# Patient Record
Sex: FEMALE | Race: WHITE | NOT HISPANIC OR LATINO | Employment: OTHER | URBAN - METROPOLITAN AREA
[De-identification: names, ages, dates, MRNs, and addresses within clinical notes are randomized per-mention and may not be internally consistent; named-entity substitution may affect disease eponyms.]

---

## 2017-01-17 ENCOUNTER — APPOINTMENT (OUTPATIENT)
Dept: VASCULAR LAB | Facility: MEDICAL CENTER | Age: 82
End: 2017-01-17
Attending: NURSE PRACTITIONER
Payer: MEDICARE

## 2017-02-19 ENCOUNTER — HOSPITAL ENCOUNTER (EMERGENCY)
Facility: MEDICAL CENTER | Age: 82
End: 2017-02-19
Attending: EMERGENCY MEDICINE
Payer: MEDICARE

## 2017-02-19 ENCOUNTER — APPOINTMENT (OUTPATIENT)
Dept: RADIOLOGY | Facility: MEDICAL CENTER | Age: 82
End: 2017-02-19
Attending: EMERGENCY MEDICINE
Payer: MEDICARE

## 2017-02-19 VITALS
HEART RATE: 80 BPM | HEIGHT: 61 IN | RESPIRATION RATE: 16 BRPM | BODY MASS INDEX: 27.06 KG/M2 | TEMPERATURE: 100.1 F | DIASTOLIC BLOOD PRESSURE: 88 MMHG | OXYGEN SATURATION: 98 % | SYSTOLIC BLOOD PRESSURE: 174 MMHG | WEIGHT: 143.3 LBS

## 2017-02-19 DIAGNOSIS — S96.912A LEFT ANKLE STRAIN, INITIAL ENCOUNTER: ICD-10-CM

## 2017-02-19 PROCEDURE — 73610 X-RAY EXAM OF ANKLE: CPT | Mod: LT

## 2017-02-19 PROCEDURE — 99284 EMERGENCY DEPT VISIT MOD MDM: CPT

## 2017-02-19 NOTE — ED AVS SNAPSHOT
2/19/2017          Fabien Linares  [address] [place] [address]    Dear Fabien:    [facility] wants to ensure your discharge home is safe and you or your loved ones have had all your questions answered regarding your care after you leave the hospital.    You may receive a telephone call within two days of your discharge.  This call is to make certain you understand your discharge instructions as well as ensure we provided you with the best care possible during your stay with us.     The call will only last approximately 3-5 minutes and will be done by a nurse.    Once again, we want to ensure your discharge home is safe and that you have a clear understanding of any next steps in your care.  If you have any questions or concerns, please do not hesitate to contact us, we are here for you.  Thank you for choosing [facility] for your healthcare needs.    Sincerely,    Pito Ellis    [facility]

## 2017-02-19 NOTE — ED AVS SNAPSHOT
Wrnch Access Code: Activation code not generated  Current Wrnch Status: Active    Ahometohart  A secure, online tool to manage your health information     [employer]s Wrnch® is a secure, online tool that connects you to your personalized health information from the privacy of your home -- day or night - making it very easy for you to manage your healthcare. Once the activation process is completed, you can even access your medical information using the Wrnch dein, which is available for free in the Apple Edin store or Google Play store.     Wrnch provides the following levels of access (as shown below):   My Chart Features   [facility] Primary Care Doctor [facility]  Specialists [facility]  Urgent  Care Non-[facility]  Primary Care  Doctor   Email your healthcare team securely and privately 24/7 X X X X   Manage appointments: schedule your next appointment; view details of past/upcoming appointments X      Request prescription refills. X      View recent personal medical records, including lab and immunizations X X X X   View health record, including health history, allergies, medications X X X X   Read reports about your outpatient visits, procedures, consult and ER notes X X X X   See your discharge summary, which is a recap of your hospital and/or ER visit that includes your diagnosis, lab results, and care plan. X X       How to register for Wrnch:  1. Go to  [email or web address].  2. Click on the Sign Up Now box, which takes you to the New Member Sign Up page. You will need to provide the following information:  a. Enter your Wrnch Access Code exactly as it appears at the top of this page. (You will not need to use this code after you’ve completed the sign-up process. If you do not sign up before the expiration date, you must request a new code.)   b. Enter your date of birth.   c. Enter your home email address.   d. Click Submit, and follow the next screen’s instructions.  3. Create a Wrnch ID. This will  be your LiveRamp login ID and cannot be changed, so think of one that is secure and easy to remember.  4. Create a LiveRamp password. You can change your password at any time.  5. Enter your Password Reset Question and Answer. This can be used at a later time if you forget your password.   6. Enter your e-mail address. This allows you to receive e-mail notifications when new information is available in LiveRamp.  7. Click Sign Up. You can now view your health information.    For assistance activating your LiveRamp account, call [phone]

## 2017-02-19 NOTE — ED AVS SNAPSHOT
Home Care Instructions                                                                                                                Fabien Linares   MRN: [id number]    Department:  [facility], Emergency Dept   Date of Visit:  2/19/2017            [facility], Emergency Dept    [address] [place] [address]    Phone:  [phone]      You were seen by     Prasad Wilson M.D.      Your Diagnosis Was     Left ankle strain, initial encounter     S96.911O       Follow-up Information     1. Follow up with Sylvain Diallo M.D..    Specialty:  Orthopaedics    Why:  As needed    Contact information    [address] [place] [id number] [phone]        Medication Information     Review all of your home medications and newly ordered medications with your primary doctor and/or pharmacist as soon as possible. Follow medication instructions as directed by your doctor and/or pharmacist.     Please keep your complete medication list with you and share with your physician. Update the information when medications are discontinued, doses are changed, or new medications (including over-the-counter products) are added; and carry medication information at all times in the event of emergency situations.               Medication List      ASK your doctor about these medications        Instructions    atorvastatin 40 MG Tabs   Commonly known as:  LIPITOR    Take 1 Tab by mouth every bedtime.   Dose:  40 mg       Coenzyme Q-10 200 MG Caps    Take 1 Cap by mouth every day.   Dose:  200 mg       metoprolol SR 25 MG Tb24   Commonly known as:  TOPROL XL    Take 0.5 Tabs by mouth every bedtime.   Dose:  12.5 mg       warfarin 5 MG Tabs   Commonly known as:  COUMADIN    Doctor's comments:  This is a refill   Take 1-1.5 tabs by mouth daily or as directed by the coumadin clinic               Procedures and tests performed during your visit     DX-ANKLE 3+ VIEWS LEFT    NURSING COMMUNICATION        Discharge Instructions       Ankle Sprain  An ankle sprain is an injury to the strong, fibrous tissues (ligaments) that hold the bones of your ankle joint together.   CAUSES  An ankle sprain is usually caused by a fall or by twisting your ankle. Ankle sprains most commonly occur when you step on the outer edge of your foot, and your ankle turns inward. People who participate in sports are more prone to these types of injuries.   SYMPTOMS   · Pain in your ankle. The pain may be present at rest or only when you are trying to stand or walk.  · Swelling.  · Bruising. Bruising may develop immediately or within 1 to 2 days after your injury.  · Difficulty standing or walking, particularly when turning corners or changing directions.  DIAGNOSIS   Your caregiver will ask you details about your injury and perform a physical exam of your ankle to determine if you have an ankle sprain. During the physical exam, your caregiver will press on and apply pressure to specific areas of your foot and ankle. Your caregiver will try to move your ankle in certain ways. An X-ray exam may be done to be sure a bone was not broken or a ligament did not separate from one of the bones in your ankle (avulsion fracture).   TREATMENT   Certain types of braces can help stabilize your ankle. Your caregiver can make a recommendation for this. Your caregiver may recommend the use of medicine for pain. If your sprain is severe, your caregiver may refer you to a surgeon who helps to restore function to parts of your skeletal system (orthopedist) or a physical therapist.  HOME CARE INSTRUCTIONS   · Apply ice to your injury for 1-2 days or as directed by your caregiver. Applying ice helps to reduce inflammation and pain.  ¨ Put ice in a plastic bag.  ¨ Place a towel between your skin and the bag.  ¨ Leave the ice on for 15-20 minutes at a time, every 2 hours while you are awake.  · Only take over-the-counter or prescription  medicines for pain, discomfort, or fever as directed by your caregiver.  · Elevate your injured ankle above the level of your heart as much as possible for 2-3 days.  · If your caregiver recommends crutches, use them as instructed. Gradually put weight on the affected ankle. Continue to use crutches or a cane until you can walk without feeling pain in your ankle.  · If you have a plaster splint, wear the splint as directed by your caregiver. Do not rest it on anything harder than a pillow for the first 24 hours. Do not put weight on it. Do not get it wet. You may take it off to take a shower or bath.  · You may have been given an elastic bandage to wear around your ankle to provide support. If the elastic bandage is too tight (you have numbness or tingling in your foot or your foot becomes cold and blue), adjust the bandage to make it comfortable.  · If you have an air splint, you may blow more air into it or let air out to make it more comfortable. You may take your splint off at night and before taking a shower or bath. Wiggle your toes in the splint several times per day to decrease swelling.  SEEK MEDICAL CARE IF:   · You have rapidly increasing bruising or swelling.  · Your toes feel extremely cold or you lose feeling in your foot.  · Your pain is not relieved with medicine.  SEEK IMMEDIATE MEDICAL CARE IF:  · Your toes are numb or blue.  · You have severe pain that is increasing.  MAKE SURE YOU:   · Understand these instructions.  · Will watch your condition.  · Will get help right away if you are not doing well or get worse.     This information is not intended to replace advice given to you by your health care provider. Make sure you discuss any questions you have with your health care provider.     Document Released: 12/18/2006 Document Revised: 01/08/2016 Document Reviewed: 12/29/2012  [employer] Interactive Patient Education ©2016 [employer]            Patient Information     Patient  Information    Following emergency treatment: all patient requiring follow-up care must return either to a private physician or a clinic if your condition worsens before you are able to obtain further medical attention, please return to the emergency room.     Billing Information    At [facility], we work to make the billing process streamlined for our patients.  Our Representatives are here to answer any questions you may have regarding your hospital bill.  If you have insurance coverage and have supplied your insurance information to us, we will submit a claim to your insurer on your behalf.  Should you have any questions regarding your bill, we can be reached online or by phone as follows:  Online: You are able pay your bills online or live chat with our representatives about any billing questions you may have. We are here to help Monday - Friday from 8:00am to 7:30pm and 9:00am - 12:00pm on Saturdays.  Please visit [email or web address]  for more information.   Phone:  [phone] or [phone]    Please note that your emergency physician, surgeon, pathologist, radiologist, anesthesiologist, and other specialists are not employed by [facility] and will therefore bill separately for their services.  Please contact them directly for any questions concerning their bills at the numbers below:     Emergency Physician Services:  [phone]  [facility]:  [phone]  [facility]:  [phone]  [facility]:  [phone]    1. Your final bill may vary from the amount quoted upon discharge if all procedures are not complete at that time, or if your doctor has additional procedures of which we are not aware. You will receive an additional bill if you return to the Emergency Department at [facility] for suture removal regardless of the facility of which the sutures were placed.     2. Please arrange for settlement of this account  at the emergency registration.    3. All self-pay accounts are due in full at the time of treatment.  If you are unable to meet this obligation then payment is expected within 4-5 days.     4. If you have had radiology studies (CT, X-ray, Ultrasound, MRI), you have received a preliminary result during your emergency department visit. Please contact the radiology department [phone] to receive a copy of your final result. Please discuss the Final result with your primary physician or with the follow up physician provided.     Crisis Hotline:  [place] Crisis Hotline:  6-592-JSIRSBW or 1-891.100.9040  [place] Crisis Hotline:    1-569.217.8813 or 772-646-2696         ED Discharge Follow Up Questions    1. In order to provide you with very good care, we would like to follow up with a phone call in the next few days.  May we have your permission to contact you?     YES /  NO    2. What is the best phone number to call you? (       )_____-__________    3. What is the best time to call you?      Morning  /  Afternoon  /  Evening                   Patient Signature:  ____________________________________________________________    Date:  ____________________________________________________________

## 2017-02-20 NOTE — ED NOTES
Discharge instructions provided. Crutches and air splint education given, Pt verbalized the understanding of discharge instructions to follow up with PCP and ortho and to return to ER if condition worsens.  Pt ambulated out of ER without difficulty.

## 2017-02-20 NOTE — ED NOTES
Chief Complaint   Patient presents with   • Ankle Pain     Left, started this afternoon while working in the yard

## 2017-02-20 NOTE — ED PROVIDER NOTES
ED Provider Note    HPI:  Patient is an 87-year-old female who presented to the emergency department February 19, 2017 at 6:29 PM with a chief complaint left ankle pain.    Patient thinks she may have strained her ankle while working in the yard today. She has pain in the lateral aspect of the ankle. No numbness or tingling. She denied knee pain. Patient also notes a little bit left-sided chest pain that she states began a couple of weeks ago when she fell. She had no abdominal pain she has not had fever chills or cough. No head or neck trauma occurred. She has no other somatic complaints.    Review of Systems: Positive left ankle pain left-sided chest wall pain negative for shortness of breath fever chills cough knee pain numbness or tingling of her left lower extremity.    Past medical/surgical history: Osteoporosis Bell's palsy reflux disease atrial fibrillation appendectomy hysterectomy    Medications: Metoprolol Coumadin Lipitor    Allergies: Actonel amoxicillin morphine Naprosyn Vicodin    Social History: Patient does not smoke occasionally use of alcohol      Physical exam: Constitutional: Elderly female awake and alert pleasant  Vital signs: Temperature 100.1 pulse 74 respirations 18 blood pressure 158/81 pulse oximetry 98%  Musculoskeletal: Pain diffusely throughout the left ankle pain seems maximal over the lateral malleolus. I can elicit no pain with vigorous palpation left knee. The patient had some mild pain with deep palpation of the chest wall on the left but no crepitance is felt. No other pain with palpation or movement of muscle bone or joint.  Neurologic: alert and awake answers questions appropriately. Decreased range of motion of left lower extremity due to pain in the ankle area. Patient is able to move her toes without difficulty. No other focal neurologic abnormalities identified.  Skin: no rash or lesion seen, no palpable dermatologic lesions identified.  Psychiatric: not anxious,  delusional, or hallucinating.    Medical decision making:  X-ray left ankle obtained; osteopenia appears to be present. There is no obvious fracture.    Patient placed in an air splint for support. She'll be discharged home. She is counseled to follow up with orthopedics if not markedly improved in 3-5 days to check for missed injury. She is given referral to orthopedist on call. I made it clear the patient as well as encouraging her x-rays do not appear to show an acute fracture or other abnormality we did not rule out an occult injury and follow-up is important as outlined above. The patient does not appear to have a significant chest injury at this time. She has no signs or symptoms of pneumonia. The patient has no signs or symptoms of a neurovascular injury.    Patient verbalized understanding of the above instructions and states she will comply    Impression left ankle sprain cannot rule out occult injury

## 2017-02-20 NOTE — DISCHARGE INSTRUCTIONS
Ankle Sprain  An ankle sprain is an injury to the strong, fibrous tissues (ligaments) that hold the bones of your ankle joint together.   CAUSES  An ankle sprain is usually caused by a fall or by twisting your ankle. Ankle sprains most commonly occur when you step on the outer edge of your foot, and your ankle turns inward. People who participate in sports are more prone to these types of injuries.   SYMPTOMS   · Pain in your ankle. The pain may be present at rest or only when you are trying to stand or walk.  · Swelling.  · Bruising. Bruising may develop immediately or within 1 to 2 days after your injury.  · Difficulty standing or walking, particularly when turning corners or changing directions.  DIAGNOSIS   Your caregiver will ask you details about your injury and perform a physical exam of your ankle to determine if you have an ankle sprain. During the physical exam, your caregiver will press on and apply pressure to specific areas of your foot and ankle. Your caregiver will try to move your ankle in certain ways. An X-ray exam may be done to be sure a bone was not broken or a ligament did not separate from one of the bones in your ankle (avulsion fracture).   TREATMENT   Certain types of braces can help stabilize your ankle. Your caregiver can make a recommendation for this. Your caregiver may recommend the use of medicine for pain. If your sprain is severe, your caregiver may refer you to a surgeon who helps to restore function to parts of your skeletal system (orthopedist) or a physical therapist.  HOME CARE INSTRUCTIONS   · Apply ice to your injury for 1-2 days or as directed by your caregiver. Applying ice helps to reduce inflammation and pain.  ¨ Put ice in a plastic bag.  ¨ Place a towel between your skin and the bag.  ¨ Leave the ice on for 15-20 minutes at a time, every 2 hours while you are awake.  · Only take over-the-counter or prescription medicines for pain, discomfort, or fever as directed by  your caregiver.  · Elevate your injured ankle above the level of your heart as much as possible for 2-3 days.  · If your caregiver recommends crutches, use them as instructed. Gradually put weight on the affected ankle. Continue to use crutches or a cane until you can walk without feeling pain in your ankle.  · If you have a plaster splint, wear the splint as directed by your caregiver. Do not rest it on anything harder than a pillow for the first 24 hours. Do not put weight on it. Do not get it wet. You may take it off to take a shower or bath.  · You may have been given an elastic bandage to wear around your ankle to provide support. If the elastic bandage is too tight (you have numbness or tingling in your foot or your foot becomes cold and blue), adjust the bandage to make it comfortable.  · If you have an air splint, you may blow more air into it or let air out to make it more comfortable. You may take your splint off at night and before taking a shower or bath. Wiggle your toes in the splint several times per day to decrease swelling.  SEEK MEDICAL CARE IF:   · You have rapidly increasing bruising or swelling.  · Your toes feel extremely cold or you lose feeling in your foot.  · Your pain is not relieved with medicine.  SEEK IMMEDIATE MEDICAL CARE IF:  · Your toes are numb or blue.  · You have severe pain that is increasing.  MAKE SURE YOU:   · Understand these instructions.  · Will watch your condition.  · Will get help right away if you are not doing well or get worse.     This information is not intended to replace advice given to you by your health care provider. Make sure you discuss any questions you have with your health care provider.     Document Released: 12/18/2006 Document Revised: 01/08/2016 Document Reviewed: 12/29/2012  [employer] Interactive Patient Education ©2016 [employer]

## 2017-03-01 DIAGNOSIS — I48.91 ATRIAL FIBRILLATION, UNSPECIFIED TYPE (HCC): ICD-10-CM

## 2017-03-10 ENCOUNTER — ANTICOAGULATION VISIT (OUTPATIENT)
Dept: VASCULAR LAB | Facility: MEDICAL CENTER | Age: 82
End: 2017-03-10
Attending: INTERNAL MEDICINE
Payer: MEDICARE

## 2017-03-10 DIAGNOSIS — I48.91 ATRIAL FIBRILLATION WITH RVR (HCC): ICD-10-CM

## 2017-03-10 LAB — INR PPP: 2.6 (ref 2–3.5)

## 2017-03-10 PROCEDURE — 85610 PROTHROMBIN TIME: CPT

## 2017-03-10 PROCEDURE — 99211 OFF/OP EST MAY X REQ PHY/QHP: CPT

## 2017-03-10 NOTE — PROGRESS NOTES
Anticoagulation Summary as of 3/10/2017     INR goal 2.0-3.0   Selected INR 2.6 (3/10/2017)   Maintenance plan 7.5 mg (5 mg x 1.5) on Mon, Fri; 5 mg (5 mg x 1) all other days   Weekly total 40 mg   Plan last modified HARVINDER Shine (2/29/2016)   Next INR check 6/2/2017   Target end date Indefinite    Indications   Atrial fibrillation with RVR (CMS-HCC) [I48.91]         Anticoagulation Episode Summary     [facility] check location [facility]    Preferred lab     Send INR reminders to     Comments Patient had LTTR- consider alternative      Anticoagulation Care Providers     Provider Role Specialty Phone number    Ines Maguire M.D. Referring Cardiology [phone]    Joseline Nash M.D. Referring Family Medicine [phone]    [facility] Responsible  [phone]        Anticoagulation Patient Findings    Patient's INR was therapeutic today in clinic.    Pt denies any unusual s/s of bleeding, bruising, clotting or any changes to diet or medications.  Confirmed dosing regimen.  Pt is to continue with current warfarin dosing regimen.    Follow up in 12 weeks.    Gareth Kamara, PHARMD

## 2017-03-10 NOTE — MR AVS SNAPSHOT
Fabien Sherman Milagros   3/10/2017 11:15 AM   Anticoagulation Visit   MRN: [id number]    Department:  Vascular Medicine   Dept Phone:  [phone]    Description:  Female : 1929   Provider:  [facility] EXAM 4           Allergies as of 3/10/2017     Allergen Noted Reactions    Actonel [Risedronate Sodium] 2010       Leg pain    Amoxicillin 09/15/2012       Morphine Sulfate 2010       Patient became A Fib    Naproxen 2010       unknown rxn; doctor said not to take    Vicodin [Hydrocodone-Acetaminophen] 2010       Unknown rxn; doctor said not to take      You were diagnosed with     Atrial fibrillation with RVR (CMS-HCC)   [119626]         Vital Signs     Smoking Status                   Never Smoker            Basic Information     Date Of Birth Sex Race Ethnicity Preferred Language    1929 Female White Non- English      Your appointments     Mar 10, 2017 11:15 AM   Established Patient with [facility] EXAM 4   [facility]  (--)    [address] [address]   [phone]            2017 11:00 AM   Established Patient with IHV EXAM 5   [facility]  (--)    [address] [address]   [phone]              Problem List              ICD-10-CM Priority Class Noted - Resolved    Atrial fibrillation with RVR (CMS-HCC) I48.91   2014 - Present    GERD (gastroesophageal reflux disease) K21.9   Unknown - Present    Bell's palsy G51.0   Unknown - Present    Osteoporosis M81.0   Unknown - Present    Colon polyps K63.5   Unknown - Present    Preventative health care Z00.00   3/5/2014 - Present      Health Maintenance        Date Due Completion Dates    IMM DTaP/Tdap/Td Vaccine (1 - Tdap) 1948 ---    PAP SMEAR 1950 ---    IMM ZOSTER VACCINE 1989 ---    IMM PNEUMOCOCCAL 65+ (ADULT) LOW/MEDIUM RISK SERIES (1 of 2 - PCV13) 1994 ---    MAMMOGRAM 5/20/2016 5/20/2015, 4/18/2014, 6/25/2012, 6/22/2011, 7/7/2010, 7/7/2010, 6/22/2009, 6/22/2009, 6/19/2008, 6/19/2008, 3/16/2007, 3/16/2007, 5/22/2006, 4/21/2005    IMM INFLUENZA (1) 9/1/2016 1/9/2014            Results     POCT Protime      Component    INR    2.6                        Current Immunizations     Influenza Vaccine Pediatric 1/9/2014 10:29 AM      Below and/or attached are the medications your provider expects you to take. Review all of your home medications and newly ordered medications with your provider and/or pharmacist. Follow medication instructions as directed by your provider and/or pharmacist. Please keep your medication list with you and share with your provider. Update the information when medications are discontinued, doses are changed, or new medications (including over-the-counter products) are added; and carry medication information at all times in the event of emergency situations     Allergies:  ACTONEL - (reactions not documented)     AMOXICILLIN - (reactions not documented)     MORPHINE SULFATE - (reactions not documented)     NAPROXEN - (reactions not documented)     VICODIN - (reactions not documented)               Medications  Valid as of: March 10, 2017 - 10:41 AM    Generic Name Brand Name Tablet Size Instructions for use    Atorvastatin Calcium (Tab) LIPITOR 40 MG Take 1 Tab by mouth every bedtime.        Coenzyme Q10 (Cap) Coenzyme Q-10 200 MG Take 1 Cap by mouth every day.        Metoprolol Succinate (TABLET SR 24 HR) TOPROL XL 25 MG Take 0.5 Tabs by mouth every bedtime.        Warfarin Sodium (Tab) COUMADIN 5 MG Take 1-1.5 tabs by mouth daily or as directed by the coumadin clinic        .                 Medicines prescribed today were sent to:     [facility] [address] [place] [address] (), [place] [address]    [address] [place] [address] () [place] [address]    Phone: [phone] Fax: [phone]    Open 24 Hours?: No      Medication refill instructions:       If your  prescription bottle indicates you have medication refills left, it is not necessary to call your provider’s office. Please contact your pharmacy and they will refill your medication.    If your prescription bottle indicates you do not have any refills left, you may request refills at any time through one of the following ways: The online Vaultive system (except Urgent Care), by calling your provider’s office, or by asking your pharmacy to contact your provider’s office with a refill request. Medication refills are processed only during regular business hours and may not be available until the next business day. Your provider may request additional information or to have a follow-up visit with you prior to refilling your medication.   *Please Note: Medication refills are assigned a new Rx number when refilled electronically. Your pharmacy may indicate that no refills were authorized even though a new prescription for the same medication is available at the pharmacy. Please request the medicine by name with the pharmacy before contacting your provider for a refill.        Warfarin Dosing Calendar   March 2017 Details    Sun Mon Tue Wed Thu Fri Sat        1               2               3               4                 5               6               7               8               9               10   2.6   7.5 mg   See details      11      5 mg           12      5 mg         13      7.5 mg         14      5 mg         15      5 mg         16      5 mg         17      7.5 mg         18      5 mg           19      5 mg         20      7.5 mg         21      5 mg         22      5 mg         23      5 mg         24      7.5 mg         25      5 mg           26      5 mg         27      7.5 mg         28      5 mg         29      5 mg         30      5 mg         31      7.5 mg           Date Details   03/10 This INR check   INR: 2.6               How to take your warfarin dose     To take:  5 mg Take 1 of the 5 mg  tablets.    To take:  7.5 mg Take 1.5 of the 5 mg tablets.           Warfarin Dosing Calendar   April 2017 Details    Sun Mon Tue Wed Thu Fri Sat           1      5 mg           2      5 mg         3      7.5 mg         4      5 mg         5      5 mg         6      5 mg         7      7.5 mg         8      5 mg           9      5 mg         10      7.5 mg         11      5 mg         12      5 mg         13      5 mg         14      7.5 mg         15      5 mg           16      5 mg         17      7.5 mg         18      5 mg         19      5 mg         20      5 mg         21      7.5 mg         22      5 mg           23      5 mg         24      7.5 mg         25      5 mg         26      5 mg         27      5 mg         28      7.5 mg         29      5 mg           30      5 mg                Date Details   No additional details            How to take your warfarin dose     To take:  5 mg Take 1 of the 5 mg tablets.    To take:  7.5 mg Take 1.5 of the 5 mg tablets.           Warfarin Dosing Calendar   May 2017 Details    Sun Mon Tue Wed Thu Fri Sat      1      7.5 mg         2      5 mg         3      5 mg         4      5 mg         5      7.5 mg         6      5 mg           7      5 mg         8      7.5 mg         9      5 mg         10      5 mg         11      5 mg         12      7.5 mg         13      5 mg           14      5 mg         15      7.5 mg         16      5 mg         17      5 mg         18      5 mg         19      7.5 mg         20      5 mg           21      5 mg         22      7.5 mg         23      5 mg         24      5 mg         25      5 mg         26      7.5 mg         27      5 mg           28      5 mg         29      7.5 mg         30      5 mg         31      5 mg             Date Details   No additional details            How to take your warfarin dose     To take:  5 mg Take 1 of the 5 mg tablets.    To take:  7.5 mg Take 1.5 of the 5 mg tablets.           Warfarin Dosing  Calendar   June 2017 Details    Sun Mon Tue Wed Thu Fri Sat         1      5 mg         2      7.5 mg         3                 4               5               6               7               8               9               10                 11               12               13               14               15               16               17                 18               19               20               21               22               23               24                 25               26               27               28               29               30                 Date Details   No additional details    Date of next INR:  6/2/2017         How to take your warfarin dose     To take:  5 mg Take 1 of the 5 mg tablets.    To take:  7.5 mg Take 1.5 of the 5 mg tablets.              K121 Access Code: Activation code not generated  Current K121 Status: Active

## 2017-04-05 ENCOUNTER — HOSPITAL ENCOUNTER (EMERGENCY)
Facility: MEDICAL CENTER | Age: 82
End: 2017-04-05
Attending: EMERGENCY MEDICINE
Payer: MEDICARE

## 2017-04-05 VITALS
HEART RATE: 61 BPM | HEIGHT: 62 IN | RESPIRATION RATE: 16 BRPM | BODY MASS INDEX: 25.4 KG/M2 | SYSTOLIC BLOOD PRESSURE: 159 MMHG | WEIGHT: 138.01 LBS | OXYGEN SATURATION: 96 % | DIASTOLIC BLOOD PRESSURE: 79 MMHG | TEMPERATURE: 97.6 F

## 2017-04-05 DIAGNOSIS — K08.9 DENTAL DISORDER: ICD-10-CM

## 2017-04-05 PROCEDURE — 99283 EMERGENCY DEPT VISIT LOW MDM: CPT

## 2017-04-05 ASSESSMENT — PAIN SCALES - GENERAL: PAINLEVEL_OUTOF10: 0

## 2017-04-05 NOTE — ED AVS SNAPSHOT
Home Care Instructions                                                                                                                Fabien Linares   MRN: [id number]    Department:  [facility], Emergency Dept   Date of Visit:  4/5/2017            [facility], Emergency Dept    [address]    [facility] [address]    Phone:  [phone]      You were seen by     Ezra Reed M.D.      Your Diagnosis Was     Dental disorder     K08.9       Follow-up Information     1. Follow up with Jimmy Coburn M.D..    Specialty:  Oral Surgery    Why:  as directed    Contact information    [address]  [facility] [address] [phone]        Medication Information     Review all of your home medications and newly ordered medications with your primary doctor and/or pharmacist as soon as possible. Follow medication instructions as directed by your doctor and/or pharmacist.     Please keep your complete medication list with you and share with your physician. Update the information when medications are discontinued, doses are changed, or new medications (including over-the-counter products) are added; and carry medication information at all times in the event of emergency situations.               Medication List      ASK your doctor about these medications        Instructions    Morning Afternoon Evening Bedtime    atorvastatin 40 MG Tabs   Commonly known as:  LIPITOR        Take 1 Tab by mouth every bedtime.   Dose:  40 mg                        Coenzyme Q-10 200 MG Caps        Take 1 Cap by mouth every day.   Dose:  200 mg                        metoprolol SR 25 MG Tb24   Commonly known as:  TOPROL XL        Take 0.5 Tabs by mouth every bedtime.   Dose:  12.5 mg                        warfarin 5 MG Tabs   Commonly known as:  COUMADIN        Doctor's comments:  This is a refill   Take 1-1.5 tabs by mouth daily or as directed by the coumadin clinic                                  Discharge Instructions       Return to the emergency department for fevers, swelling, severe bleeding or concern          Patient Information     Patient Information    Following emergency treatment: all patient requiring follow-up care must return either to a private physician or a clinic if your condition worsens before you are able to obtain further medical attention, please return to the emergency room.     Billing Information    At [facility], we work to make the billing process streamlined for our patients.  Our Representatives are here to answer any questions you may have regarding your hospital bill.  If you have insurance coverage and have supplied your insurance information to us, we will submit a claim to your insurer on your behalf.  Should you have any questions regarding your bill, we can be reached online or by phone as follows:  Online: You are able pay your bills online or live chat with our representatives about any billing questions you may have. We are here to help Monday - Friday from 8:00am to 7:30pm and 9:00am - 12:00pm on Saturdays.  Please visit [email or web address]  for more information.   Phone:  [phone] or [phone]    Please note that your emergency physician, surgeon, pathologist, radiologist, anesthesiologist, and other specialists are not employed by [facility] and will therefore bill separately for their services.  Please contact them directly for any questions concerning their bills at the numbers below:     Emergency Physician Services:  [phone]  [facility]:  [phone]  [facility]:  [phone]  [facility]:  [phone]    1. Your final bill may vary from the amount quoted upon discharge if all procedures are not complete at that time, or if your doctor has additional procedures of which we are not aware. You will receive an additional bill if you return to the Emergency  Department at [facility] for suture removal regardless of the facility of which the sutures were placed.     2. Please arrange for settlement of this account at the emergency registration.    3. All self-pay accounts are due in full at the time of treatment.  If you are unable to meet this obligation then payment is expected within 4-5 days.     4. If you have had radiology studies (CT, X-ray, Ultrasound, MRI), you have received a preliminary result during your emergency department visit. Please contact the radiology department [phone] to receive a copy of your final result. Please discuss the Final result with your primary physician or with the follow up physician provided.     Crisis Hotline:  [place] Crisis Hotline:  6-063-VDUJVBV or 1-759.561.7382  [place] Crisis Hotline:    1-897.969.9188 or 633-420-5247         ED Discharge Follow Up Questions    1. In order to provide you with very good care, we would like to follow up with a phone call in the next few days.  May we have your permission to contact you?     YES /  NO    2. What is the best phone number to call you? (       )_____-__________    3. What is the best time to call you?      Morning  /  Afternoon  /  Evening                   Patient Signature:  ____________________________________________________________    Date:  ____________________________________________________________      Your appointments     Jun 02, 2017 11:00 AM   Established Patient with IHVH EXAM 5   [facility]  (--)    [address] [facility] [address] [facility]  [place] [address]   [phone]

## 2017-04-05 NOTE — ED NOTES
Pt discharge home. Pt given discharge instructions . Pt verbalized understanding, all questions answered ,vss upon d/c. Pt steady on feet upon discharge

## 2017-04-05 NOTE — ED PROVIDER NOTES
"ED Provider Note    CHIEF COMPLAINT  Chief Complaint   Patient presents with   • Other     Pt had molar removed from top left jaw on . Pt now has large clot where molar was removed.        HPI  Fabien Linares is a 87 y.o. female who presents after seeing a clot at a recent dental extraction site. She had molar removed on  with Dr. Coburn. Saw a large clot this morning and became concerned because her mother  because of blood clots. She denies any other symptoms. She states that the clot was larger, but is now smaller and thinks it might have moved. No chest pain shortness of breath, swelling, ongoing bleeding    REVIEW OF SYSTEMS  Pertinent negative: As above    PAST MEDICAL HISTORY  Past Medical History   Diagnosis Date   • Osteoporosis, unspecified    • Bell's palsy    • GERD (gastroesophageal reflux disease)    • Atrial fibrillation with RVR    • Colon polyps        SOCIAL HISTORY  Social History   Substance Use Topics   • Smoking status: Never Smoker    • Smokeless tobacco: Never Used   • Alcohol Use: Yes      Comment: occ wine       SURGICAL HISTORY  Past Surgical History   Procedure Laterality Date   • Appendectomy     • Inguinal hernia repair       Hernia Repair, Inguinal   • Vaginal hysterectomy total       Hysterectomy,Total Vaginal   • Other orthopedic surgery       foot surgery bilat   • Lumpectomy         ALLERGIES  Allergies   Allergen Reactions   • Actonel [Risedronate Sodium]      Leg pain   • Amoxicillin    • Morphine Sulfate      Patient became A Fib   • Naproxen      unknown rxn; doctor said not to take   • Vicodin [Hydrocodone-Acetaminophen]      Unknown rxn; doctor said not to take       PHYSICAL EXAM  VITAL SIGNS: /73 mmHg  Pulse 72  Temp(Src) 36 °C (96.8 °F)  Resp 16  Ht 1.575 m (5' 2\")  Wt 62.6 kg (138 lb 0.1 oz)  BMI 25.24 kg/m2  SpO2 94%   Constitutional: Awake and alert. Nontoxic  HENT: There is an extraction socket left 1st maxillary molar. No remarkable " surrounding swelling. No discharge. There is a small clot within the socket.  Eyes: Grossly normal  Neck: Normal range of motion  Cardiovascular: Normal heart rate   Thorax & Lungs: No respiratory distress  Skin:  No pathologic rash.   Extremities: Well perfused  Psychiatric: Affect normal        COURSE & MEDICAL DECISION MAKING  Patient presents to the ER concerned about the possibility of venous thromboembolus related to a thrombus with in the gingiva after dental extraction. Her physical exam is unremarkable. She does not have any concerning symptoms. I have offered reassurance. Advised to return to the ER for swelling, fevers or concern.      FINAL IMPRESSION  1. Post dental extraction thrombus      Disposition: home in good condition      This dictation was created using voice recognition software. The accuracy of the dictation is limited to the abilities of the software.  The nursing notes were reviewed and certain aspects of this information were incorporated into this note.      Electronically signed by: Ezra Reed, 4/5/2017 7:21 AM

## 2017-04-05 NOTE — ED AVS SNAPSHOT
4/5/2017          Fabien Linares  [address] [place] [address]    Dear Fabien:    [facility] wants to ensure your discharge home is safe and you or your loved ones have had all your questions answered regarding your care after you leave the hospital.    You may receive a telephone call within two days of your discharge.  This call is to make certain you understand your discharge instructions as well as ensure we provided you with the best care possible during your stay with us.     The call will only last approximately 3-5 minutes and will be done by a nurse.    Once again, we want to ensure your discharge home is safe and that you have a clear understanding of any next steps in your care.  If you have any questions or concerns, please do not hesitate to contact us, we are here for you.  Thank you for choosing [facility] for your healthcare needs.    Sincerely,    Pito Ellis    [facility]

## 2017-04-05 NOTE — ED NOTES
"Fabien Linares  87 y.o. female  Chief Complaint   Patient presents with   • Other     Pt had molar removed from top left jaw on 4/4. Pt now has large clot where molar was removed.      Pt amb to triage with steady gait for above complaint. Pt states, \"I'm just afraid of the clot moving.\" Pt has no other complaints at this time.  Pt placed in lobby. Pt educated on triage process. Pt encouraged to alert staff for any changes.    "

## 2017-04-05 NOTE — ED AVS SNAPSHOT
RealTargeting Access Code: Activation code not generated  Current RealTargeting Status: Active    [employer]  A secure, online tool to manage your health information     [employer] RealTargeting® is a secure, online tool that connects you to your personalized health information from the privacy of your home -- day or night - making it very easy for you to manage your healthcare. Once the activation process is completed, you can even access your medical information using the RealTargeting edin, which is available for free in the Apple Edin store or Google Play store.     RealTargeting provides the following levels of access (as shown below):   My Chart Features   [facility] Primary Care Doctor [facility]  Specialists [facility]  Urgent  Care Non-[facility]  Primary Care  Doctor   Email your healthcare team securely and privately 24/7 X X X X   Manage appointments: schedule your next appointment; view details of past/upcoming appointments X      Request prescription refills. X      View recent personal medical records, including lab and immunizations X X X X   View health record, including health history, allergies, medications X X X X   Read reports about your outpatient visits, procedures, consult and ER notes X X X X   See your discharge summary, which is a recap of your hospital and/or ER visit that includes your diagnosis, lab results, and care plan. X X       How to register for RealTargeting:  1. Go to  [email or web address].  2. Click on the Sign Up Now box, which takes you to the New Member Sign Up page. You will need to provide the following information:  a. Enter your RealTargeting Access Code exactly as it appears at the top of this page. (You will not need to use this code after you’ve completed the sign-up process. If you do not sign up before the expiration date, you must request a new code.)   b. Enter your date of birth.   c. Enter your home email address.   d. Click Submit, and follow the next screen’s instructions.  3. Create a RealTargeting ID. This will  be your [employer] login ID and cannot be changed, so think of one that is secure and easy to remember.  4. Create a [employer] password. You can change your password at any time.  5. Enter your Password Reset Question and Answer. This can be used at a later time if you forget your password.   6. Enter your e-mail address. This allows you to receive e-mail notifications when new information is available in [employer].  7. Click Sign Up. You can now view your health information.    For assistance activating your [employer] account, call [phone]

## 2017-06-14 ENCOUNTER — OFFICE VISIT (OUTPATIENT)
Dept: MEDICAL GROUP | Facility: MEDICAL CENTER | Age: 82
End: 2017-06-14
Payer: MEDICARE

## 2017-06-14 VITALS
TEMPERATURE: 99 F | OXYGEN SATURATION: 91 % | HEIGHT: 61 IN | SYSTOLIC BLOOD PRESSURE: 120 MMHG | DIASTOLIC BLOOD PRESSURE: 80 MMHG | WEIGHT: 132.72 LBS | RESPIRATION RATE: 14 BRPM | HEART RATE: 75 BPM | BODY MASS INDEX: 25.06 KG/M2

## 2017-06-14 DIAGNOSIS — R19.7 DIARRHEA OF PRESUMED INFECTIOUS ORIGIN: ICD-10-CM

## 2017-06-14 PROCEDURE — 99214 OFFICE O/P EST MOD 30 MIN: CPT | Performed by: PHYSICIAN ASSISTANT

## 2017-06-14 RX ORDER — DIPHENOXYLATE HYDROCHLORIDE AND ATROPINE SULFATE 2.5; .025 MG/1; MG/1
1 TABLET ORAL 3 TIMES DAILY PRN
Qty: 45 TAB | Refills: 0 | Status: SHIPPED | OUTPATIENT
Start: 2017-06-14 | End: 2017-06-29 | Stop reason: SDUPTHER

## 2017-06-14 ASSESSMENT — PATIENT HEALTH QUESTIONNAIRE - PHQ9: CLINICAL INTERPRETATION OF PHQ2 SCORE: 0

## 2017-06-14 NOTE — ASSESSMENT & PLAN NOTE
Patient states this has been going on for approximately 2 weeks. Patient states she does have a number of bowel movements per day. Patient states these are watery bowel movements. Denies blood. Patient states no recent travel. Denies camping. Patient states no recent hospitalizations. Patient states no known sick contacts. Recently has not taken any antibiotics. Patient states she has tried over-the-counter antidiarrheal medicine, and states not working. Patient states no change in stool odor.

## 2017-06-14 NOTE — PROGRESS NOTES
Chief Complaint   Patient presents with   • Diarrhea     2x weeks       HISTORY OF PRESENT ILLNESS: Patient is a 88 y.o. female established patient who presents today to discuss diarrhea    Diarrhea of presumed infectious origin  Patient states this has been going on for approximately 2 weeks. Patient states she does have a number of bowel movements per day. Patient states these are watery bowel movements. Denies blood. Patient states no recent travel. Denies camping. Patient states no recent hospitalizations. Patient states no known sick contacts. Recently has not taken any antibiotics. Patient states she has tried over-the-counter antidiarrheal medicine, and states not working. Patient states no change in stool odor.       Patient Active Problem List    Diagnosis Date Noted   • Diarrhea of presumed infectious origin 06/14/2017   • Preventative health care 03/05/2014   • GERD (gastroesophageal reflux disease)    • Bell's palsy    • Osteoporosis    • Colon polyps    • Atrial fibrillation with RVR (CMS-[facility]) 01/08/2014       Allergies:Actonel; Amoxicillin; Morphine sulfate; Naproxen; and Vicodin    Current Outpatient Prescriptions   Medication Sig Dispense Refill   • diphenoxylate-atropine (LOMOTIL) 2.5-0.025 MG Tab Take 1 Tab by mouth 3 times a day as needed for Diarrhea. 45 Tab 0   • warfarin (COUMADIN) 5 MG Tab Take 1-1.5 tabs by mouth daily or as directed by the coumadin clinic 120 Tab 1   • metoprolol SR (TOPROL XL) 25 MG TABLET SR 24 HR Take 0.5 Tabs by mouth every bedtime. 45 Tab 3   • atorvastatin (LIPITOR) 40 MG Tab Take 1 Tab by mouth every bedtime. 30 Tab 11   • Coenzyme Q-10 200 MG Cap Take 1 Cap by mouth every day. 90 Cap 3     No current facility-administered medications for this visit.       Social History   Substance Use Topics   • Smoking status: Never Smoker    • Smokeless tobacco: Never Used   • Alcohol Use: Yes      Comment: occ wine       Family Status   Relation Status Death Age   • Mother  "    • Father     • Sister     • Maternal Grandmother     • Maternal Grandfather     • Paternal Grandmother     • Paternal Grandfather       Family History   Problem Relation Age of Onset   • Cancer Sister        Review of Systems:   Constitutional: Negative for fever, chills, weight loss and malaise/fatigue.   HENT: Negative for ear pain, nosebleeds, congestion, sore throat and neck pain.    Eyes: Negative for blurred vision.   Respiratory: Negative for cough, sputum production, shortness of breath and wheezing.    Cardiovascular: Negative for chest pain, palpitations, orthopnea and leg swelling.   Gastrointestinal: Negative for heartburn, nausea, vomiting and abdominal pain.   Genitourinary: Negative for dysuria, urgency and frequency.   Musculoskeletal: Negative for myalgias, back pain and joint pain.   Skin: Negative for rash and itching.   Neurological: Negative for dizziness, tingling, tremors, sensory change, focal weakness and headaches.   Endo/Heme/Allergies: Does not bruise/bleed easily.   Psychiatric/Behavioral: Negative for depression, suicidal ideas and memory loss.  The patient is not nervous/anxious and does not have insomnia.    All other systems reviewed and are negative except as in HPI.    Exam:  Blood pressure 120/80, pulse 75, temperature 37.2 °C (99 °F), resp. rate 14, height 1.562 m (5' 1.5\"), weight 60.2 kg (132 lb 11.5 oz), SpO2 91 %, not currently breastfeeding.  General:  Well nourished, well developed female in NAD  Head: is grossly normal.  HEENT: Eyes conjunctiva is clear, lids without ptosis, pupils equal round and reactive to light and accommodation.  Ears normal shape and contour, canals are clear bilaterally, TMs with good light reflex and appear normal.  Nasal mucosa pale and edematous with clear rhinorrhea.  Oropharynx moist.  Sinuses (frontal and maxillary) nontender to palpation.   Neck: Supple without JVD or bruit. Thyroid " is not enlarged.  Pulmonary: Clear to ausculation. Normal effort. No rales, ronchi, or wheezing.  Cardiovascular: Regular rate and rhythm without murmur. Carotid and radial pulses are intact and equal bilaterally.  Extremities: no clubbing, cyanosis, or edema.  Abdomen: Nontender. Nondistended. No paraspinal megaly.    Medical decision-making and discussion: 88-year-old female presents to clinic with two-week evolution of diarrhea. At such time no clinical signs of dehydration. Blood pressure 120/80 with heart rate of 75 bpm. Eyes normal, oral mucosa moist. Eyes patient has had diarrhea for 2 weeks concerned over possible infectious etiology and therefore stool culture, O and P, WBC, fecal occult, C. difficile all ordered. Small course of Lomotil order for patient as well. ER precautions given to patient.    Please note that this dictation was created using voice recognition software. I have made every reasonable attempt to correct obvious errors, but I expect that there are errors of grammar and possibly content that I did not discover before finalizing the note.    Assessment/Plan:  1. Diarrhea of presumed infectious origin  diphenoxylate-atropine (LOMOTIL) 2.5-0.025 MG Tab    STOOL CULT+O+P+WBC    CDIFF BY PCR    OCCULT BLOOD STOOL    CANCELED: CDIFF BY PCR    CANCELED: COMPLETE O&P    CANCELED: CULTURE STOOL

## 2017-06-17 ENCOUNTER — HOSPITAL ENCOUNTER (EMERGENCY)
Facility: MEDICAL CENTER | Age: 82
End: 2017-06-17
Attending: EMERGENCY MEDICINE
Payer: MEDICARE

## 2017-06-17 VITALS
DIASTOLIC BLOOD PRESSURE: 67 MMHG | WEIGHT: 130.51 LBS | HEIGHT: 62 IN | BODY MASS INDEX: 24.02 KG/M2 | HEART RATE: 72 BPM | OXYGEN SATURATION: 94 % | RESPIRATION RATE: 16 BRPM | TEMPERATURE: 98.5 F | SYSTOLIC BLOOD PRESSURE: 123 MMHG

## 2017-06-17 DIAGNOSIS — R19.7 DIARRHEA, UNSPECIFIED TYPE: ICD-10-CM

## 2017-06-17 PROCEDURE — 99283 EMERGENCY DEPT VISIT LOW MDM: CPT

## 2017-06-17 RX ORDER — WARFARIN SODIUM 5 MG/1
5-7.5 TABLET ORAL DAILY
Status: SHIPPED | COMMUNITY
End: 2017-08-23

## 2017-06-17 NOTE — ED AVS SNAPSHOT
[employer] Access Code: Activation code not generated  Current [employer] Status: Active    Xpressohart  A secure, online tool to manage your health information     [employer]’s [employer]® is a secure, online tool that connects you to your personalized health information from the privacy of your home -- day or night - making it very easy for you to manage your healthcare. Once the activation process is completed, you can even access your medical information using the [employer] edin, which is available for free in the Apple Edin store or Google Play store.     [employer] provides the following levels of access (as shown below):   My Chart Features   [facility] Primary Care Doctor [facility]  Specialists [facility]  Urgent  Care Non-[facility]  Primary Care  Doctor   Email your healthcare team securely and privately 24/7 X X X X   Manage appointments: schedule your next appointment; view details of past/upcoming appointments X      Request prescription refills. X      View recent personal medical records, including lab and immunizations X X X X   View health record, including health history, allergies, medications X X X X   Read reports about your outpatient visits, procedures, consult and ER notes X X X X   See your discharge summary, which is a recap of your hospital and/or ER visit that includes your diagnosis, lab results, and care plan. X X       How to register for [employer]:  1. Go to  [email or web address].  2. Click on the Sign Up Now box, which takes you to the New Member Sign Up page. You will need to provide the following information:  a. Enter your [employer] Access Code exactly as it appears at the top of this page. (You will not need to use this code after you’ve completed the sign-up process. If you do not sign up before the expiration date, you must request a new code.)   b. Enter your date of birth.   c. Enter your home email address.   d. Click Submit, and follow the next screen’s instructions.  3. Create a [employer] ID. This will  be your [employer] login ID and cannot be changed, so think of one that is secure and easy to remember.  4. Create a [employer] password. You can change your password at any time.  5. Enter your Password Reset Question and Answer. This can be used at a later time if you forget your password.   6. Enter your e-mail address. This allows you to receive e-mail notifications when new information is available in [employer].  7. Click Sign Up. You can now view your health information.    For assistance activating your [employer] account, call [phone]

## 2017-06-17 NOTE — ED NOTES
Pt c/o diarrhea x several days. Pt denies recent antibiotic treatment. Pt seen by PCP 3 days ago for same. Pt taking Lomotil without relief.

## 2017-06-17 NOTE — DISCHARGE INSTRUCTIONS
Chronic Diarrhea  Diarrhea is frequent loose and watery bowel movements. It can cause you to feel weak and dehydrated. Dehydration can cause you to become tired and thirsty and to have a dry mouth, decreased urination, and dark yellow urine. Diarrhea is a sign of another problem, most often an infection that will not last long. In most cases, diarrhea lasts 2-3 days. Diarrhea that lasts longer than 4 weeks is called long-lasting (chronic) diarrhea. It is important to treat your diarrhea as directed by your health care provider to lessen or prevent future episodes of diarrhea.   CAUSES   There are many causes of chronic diarrhea. The following are some possible causes:   · Gastrointestinal infections caused by viruses, bacteria, or parasites.    · Food poisoning or food allergies.    · Certain medicines, such as antibiotics, chemotherapy, and laxatives.    · Artificial sweeteners and fructose.    · Digestive disorders, such as celiac disease and inflammatory bowel diseases.    · Irritable bowel syndrome.  · Some disorders of the pancreas.  · Disorders of the thyroid.  · Reduced blood flow to the intestines.  · Cancer.  Sometimes the cause of chronic diarrhea is unknown.  RISK FACTORS  · Having a severely weakened immune system, such as from HIV or AIDS.    · Taking certain types of cancer-fighting drugs (such as with chemotherapy) or other medicines.    · Having had a recent organ transplant.    · Having a portion of the stomach or small bowel removed.    · Traveling to countries where food and water supplies are often contaminated.    SYMPTOMS   In addition to frequent, loose stools, diarrhea may cause:   · Cramping.    · Abdominal pain.    · Nausea.    · Fever.  · Fatigue.  · Urgent need to use the bathroom.  · Loss of bowel control.  DIAGNOSIS   Your health care provider must take a careful history and perform a physical exam. Tests given are based on your symptoms and history. Tests may include:   · Blood or  stool tests. Three or more stool samples may be examined. Stool cultures may be used to test for bacteria or parasites.    · X-rays.    · A procedure in which a thin tube is inserted into the mouth or rectum (endoscopy). This allows the health care provider to look inside the intestine.    TREATMENT   · Treatment is aimed at correcting the cause of the diarrhea when possible.  · Diarrhea caused by an infection can often be treated with antibiotic medicines.  · Diarrhea not caused by an infection may require you to take long-term medicine or have surgery. Specific treatment should be discussed with your health care provider.  · If the cause cannot be determined, treatment aims to relieve symptoms and prevent dehydration. Serious health problems can occur if you do not maintain proper fluid levels. Treatment may include:  ¨ Taking an oral rehydration solution (ORS).  ¨ Not drinking beverages that contain caffeine (such as tea, coffee, and soft drinks).  ¨ Not drinking alcohol.  ¨ Maintaining well-balanced nutrition to help you recover faster.  HOME CARE INSTRUCTIONS   · Drink enough fluids to keep urine clear or pale yellow. Drink 1 cup (8 oz) of fluid for each diarrhea episode. Avoid fluids that contain simple sugars, fruit juices, whole milk products, and sodas. Hydrate with an ORS. You may purchase the ORS or prepare it at home by mixing the following ingredients together:  ¨  - tsp (1.7-3  mL) table salt.  ¨ ¾ tsp (3 ¾ mL) baking soda.  ¨  tsp (1.7 mL) salt substitute containing potassium chloride.  ¨ 1 tbsp (20 mL) sugar.  ¨ 4.2 c (1 L) of water.    · Certain foods and beverages may increase the speed at which food moves through the gastrointestinal (GI) tract. These foods and beverages should be avoided. They include:  ¨ Caffeinated and alcoholic beverages.  ¨ High-fiber foods, such as raw fruits and vegetables, nuts, seeds, and whole grain breads and cereals.  ¨ Foods and beverages sweetened with sugar  alcohols, such as xylitol, sorbitol, and mannitol.    · Some foods may be well tolerated and may help thicken stool. These include:  ¨ Starchy foods, such as rice, toast, pasta, low-sugar cereal, oatmeal, grits, baked potatoes, crackers, and bagels.  ¨ Bananas.  ¨ Applesauce.  · Add probiotic-rich foods to help increase healthy bacteria in the GI tract. These include yogurt and fermented milk products.  · Wash your hands well after each diarrhea episode.  · Only take over-the-counter or prescription medicines as directed by your health care provider.  · Take a warm bath to relieve any burning or pain from frequent diarrhea episodes.  SEEK MEDICAL CARE IF:   · You are not urinating as often.  · Your urine is a dark color.  · You become very tired or dizzy.  · You have severe pain in the abdomen or rectum.  · Your have blood or pus in your stools.  · Your stools look black and tarry.  SEEK IMMEDIATE MEDICAL CARE IF:   · You are unable to keep fluids down.  · You have persistent vomiting.  · You have blood in your stool.  · Your stools are black and tarry.  · You do not urinate in 6-8 hours, or there is only a small amount of very dark urine.  · You have abdominal pain that increases or localizes.  · You have weakness, dizziness, confusion, or lightheadedness.  · You have a severe headache.  · Your diarrhea gets worse or does not get better.  · You have a fever or persistent symptoms for more than 2-3 days.  · You have a fever and your symptoms suddenly get worse.  MAKE SURE YOU:   · Understand these instructions.  · Will watch your condition.  · Will get help right away if you are not doing well or get worse.     This information is not intended to replace advice given to you by your health care provider. Make sure you discuss any questions you have with your health care provider.     Document Released: 03/09/2005 Document Revised: 12/23/2014 Document Reviewed: 06/12/2014  [employer] Interactive Patient Education ©2016  Elsevier Inc.    Diet for Irritable Bowel Syndrome  When you have irritable bowel syndrome (IBS), the foods you eat and your eating habits are very important. IBS may cause various symptoms, such as abdominal pain, constipation, or diarrhea. Choosing the right foods can help ease discomfort caused by these symptoms. Work with your health care provider and dietitian to find the best eating plan to help control your symptoms.  WHAT GENERAL GUIDELINES DO I NEED TO FOLLOW?  · Keep a food diary. This will help you identify foods that cause symptoms. Write down:  ¨ What you eat and when.  ¨ What symptoms you have.  ¨ When symptoms occur in relation to your meals.  · Avoid foods that cause symptoms. Talk with your dietitian about other ways to get the same nutrients that are in these foods.  · Eat more foods that contain fiber. Take a fiber supplement if directed by your dietitian.  · Eat your meals slowly, in a relaxed setting.  · Aim to eat 5-6 small meals per day. Do not skip meals.  · Drink enough fluids to keep your urine clear or pale yellow.  · Ask your health care provider if you should take an over-the-counter probiotic during flare-ups to help restore healthy gut bacteria.  · If you have cramping or diarrhea, try making your meals low in fat and high in carbohydrates. Examples of carbohydrates are pasta, rice, whole grain breads and cereals, fruits, and vegetables.  · If dairy products cause your symptoms to flare up, try eating less of them. You might be able to handle yogurt better than other dairy products because it contains bacteria that help with digestion.  WHAT FOODS ARE NOT RECOMMENDED?  The following are some foods and drinks that may worsen your symptoms:  · Fatty foods, such as French fries.  · Milk products, such as cheese or ice cream.  · Chocolate.  · Alcohol.  · Products with caffeine, such as coffee.  · Carbonated drinks, such as soda.  The items listed above may not be a complete list of foods  and beverages to avoid. Contact your dietitian for more information.  WHAT FOODS ARE GOOD SOURCES OF FIBER?  Your health care provider or dietitian may recommend that you eat more foods that contain fiber. Fiber can help reduce constipation and other IBS symptoms. Add foods with fiber to your diet a little at a time so that your body can get used to them. Too much fiber at once might cause gas and swelling of your abdomen. The following are some foods that are good sources of fiber:  · Apples.  · Peaches.  · Pears.  · Berries.  · Figs.  · Broccoli (raw).  · Cabbage.  · Carrots.  · Raw peas.  · Kidney beans.  · Lima beans.  · Whole grain bread.  · Whole grain cereal.  FOR MORE INFORMATION   International Foundation for Functional Gastrointestinal Disorders: www.iffgd.org  National [place] of Diabetes and Digestive and Kidney Diseases: www.niddk.nih.gov/health-information/health-topics/digestive-diseases/ibs/Pages/facts.aspx     This information is not intended to replace advice given to you by your health care provider. Make sure you discuss any questions you have with your health care provider.     Document Released: 03/09/2005 Document Revised: 01/08/2016 Document Reviewed: 03/20/2015  Elsevier Interactive Patient Education ©2016 Elsevier Inc.

## 2017-06-17 NOTE — ED PROVIDER NOTES
"ED Provider Note    CHIEF COMPLAINT  Chief Complaint   Patient presents with   • Diarrhea       HPI  Fabien Linares is a 88 y.o. female who presents stating that she was at her doctor's office 3 days ago for chronic diarrhea for the last 4-5 weeks. She states that Lomotil is not improving her symptoms but she denies any lightheadedness, syncope, fever, chills, sweats, significant abdominal pain. She says that her diarrhea is just an irritant and she would like some relief. Denies any other complaints and has not used antibiotics in recent weeks    REVIEW OF SYSTEMS  See HPI for further details. All other systems are negative.     PAST MEDICAL HISTORY  Past Medical History   Diagnosis Date   • Osteoporosis, unspecified    • Bell's palsy    • GERD (gastroesophageal reflux disease)    • Atrial fibrillation with RVR (CMS-HCC)    • Colon polyps        FAMILY HISTORY  Family History   Problem Relation Age of Onset   • Cancer Sister        SOCIAL HISTORY   reports that she has never smoked. She has never used smokeless tobacco. She reports that she drinks alcohol. She reports that she does not use illicit drugs.    SURGICAL HISTORY  Past Surgical History   Procedure Laterality Date   • Appendectomy     • Inguinal hernia repair       Hernia Repair, Inguinal   • Vaginal hysterectomy total       Hysterectomy,Total Vaginal   • Other orthopedic surgery       foot surgery bilat   • Lumpectomy         CURRENT MEDICATIONS  Home Medications     **Home medications have not yet been reviewed for this encounter**          ALLERGIES  Allergies   Allergen Reactions   • Actonel [Risedronate Sodium]      Leg pain   • Amoxicillin    • Morphine Sulfate      Patient became A Fib   • Naproxen      unknown rxn; doctor said not to take   • Vicodin [Hydrocodone-Acetaminophen]      Unknown rxn; doctor said not to take       PHYSICAL EXAM  VITAL SIGNS: /67 mmHg  Pulse 66  Temp(Src) 36.9 °C (98.5 °F)  Resp 16  Ht 1.575 m (5' 2\")  Wt " 59.2 kg (130 lb 8.2 oz)  BMI 23.86 kg/m2  SpO2 94%   Constitutional: Well developed, Well nourished, No acute distress, Non-toxic appearance.   HENT: Normocephalic, Atraumatic, Bilateral external ears normal, Oropharynx is clear mucous membranes are dry. No oral exudates or nasal discharge.   Eyes: Pupils are equal round and reactive, EOMI, Conjunctiva normal, No discharge.   Neck: Normal range of motion, No tenderness, Supple, No stridor. No meningismus.  Lymphatic: No lymphadenopathy noted.   Cardiovascular: Regular rate and rhythm without murmur rub or gallop.  Thorax & Lungs: Clear breath sounds bilaterally without wheezes, rhonchi or rales. There is no chest wall tenderness.   Abdomen: Soft non-tender non-distended. There is no rebound or guarding. No organomegaly is appreciated. Bowel sounds are normal.  Skin: Normal without rash.   Back: No CVA or spinal tenderness.   Extremities: Intact distal pulses, No edema, No tenderness, No cyanosis, No clubbing. Capillary refill is less than 2 seconds.  Musculoskeletal: Good range of motion in all major joints. No tenderness to palpation or major deformities noted.   Neurologic: Alert & oriented x 3, Normal motor function, Normal sensory function, No focal deficits noted. Reflexes are normal.  Psychiatric: Affect normal, Judgment normal, Mood normal. There is no suicidal ideation or patient reported hallucinations.       COURSE & MEDICAL DECISION MAKING  Pertinent Labs & Imaging studies reviewed. (See chart for details)  The patient has a soft abdomen and normal vital signs. I don't think she needs a workup today but rather needs referral to GI consultants as she has not had a colonoscopy more than 10 years and given this onset of diarrhea that is now present for at least 4 or is much as 6 weeks, there is unlikely etiology of C. diff colitis and more likely that this is irritable bowel. I don't think this is an infectious colitis. She is not at risk for  parasites    Patient is discharged in stable condition with referral to GI consultants    FINAL IMPRESSION  1. Diarrhea, unspecified type             Electronically signed by: Atilio Goyal, 6/17/2017 8:43 AM

## 2017-06-17 NOTE — ED NOTES
DC instructions given to pt and highlighted on form. Pt verbalized understanding. Pt steady on feet with 0 s/s distress noted. Pt dcd home.

## 2017-06-17 NOTE — ED AVS SNAPSHOT
6/17/2017    Fabien Linares  [address] [place] [address]    Dear Fabien:    [facility] wants to ensure your discharge home is safe and you or your loved ones have had all of your questions answered regarding your care after you leave the hospital.    Below is a list of resources and contact information should you have any questions regarding your hospital stay, follow-up instructions, or active medical symptoms.    Questions or Concerns Regarding… Contact   Medical Questions Related to Your Discharge  (7 days a week, 8am-5pm) Contact a Nurse Care Coordinator   [phone]   Medical Questions Not Related to Your Discharge  (24 hours a day / 7 days a week)  Contact the Nurse Health Line   [phone]    Medications or Discharge Instructions Refer to your discharge packet   or contact your [facility] Primary Care Provider   [phone]   Follow-up Appointment(s) Schedule your appointment via [employer]   or contact Scheduling [phone]   Billing Review your statement via [employer]  or contact Billing [phone]   Medical Records Review your records via [employer]   or contact Medical Records [phone]     You may receive a telephone call within two days of discharge. This call is to make certain you understand your discharge instructions and have the opportunity to have any questions answered. You can also easily access your medical information, test results and upcoming appointments via the [employer] free online health management tool. You can learn more and sign up at [email or web address]. For assistance setting up your [employer] account, please call [phone].    Once again, we want to ensure your discharge home is safe and that you have a clear understanding of any next steps in your care. If you have any questions or concerns, please do not hesitate to contact us, we are here for you. Thank you for choosing [facility] for your healthcare needs.    Sincerely,    Your [facility] Healthcare Team

## 2017-06-17 NOTE — ED AVS SNAPSHOT
Home Care Instructions                                                                                                                Fabien Linares   MRN: [id number]    Department:  [facility], Emergency Dept   Date of Visit:  6/17/2017            [facility], Emergency Dept    [address]    [place] [address]    Phone:  [phone]      You were seen by     Atilio Goyal M.D.      Your Diagnosis Was     Diarrhea, unspecified type     R19.7       Follow-up Information     1. Schedule an appointment as soon as possible for a visit with [facility].    Contact information    [place] [address] [phone]        Medication Information     Review all of your home medications and newly ordered medications with your primary doctor and/or pharmacist as soon as possible. Follow medication instructions as directed by your doctor and/or pharmacist.     Please keep your complete medication list with you and share with your physician. Update the information when medications are discontinued, doses are changed, or new medications (including over-the-counter products) are added; and carry medication information at all times in the event of emergency situations.               Medication List      ASK your doctor about these medications        Instructions    Morning Afternoon Evening Bedtime    atorvastatin 40 MG Tabs   Commonly known as:  LIPITOR        Take 1 Tab by mouth every bedtime.   Dose:  40 mg                        Coenzyme Q-10 200 MG Caps        Take 1 Cap by mouth every day.   Dose:  200 mg                        diphenoxylate-atropine 2.5-0.025 MG Tabs   Commonly known as:  LOMOTIL        Take 1 Tab by mouth 3 times a day as needed for Diarrhea.   Dose:  1 Tab                        metoprolol SR 25 MG Tb24   Commonly known as:  TOPROL XL        Take 0.5 Tabs by mouth every bedtime.   Dose:  12.5 mg                        warfarin 5 MG Tabs    Commonly known as:  COUMADIN        Doctor's comments:  This is a refill   Take 1-1.5 tabs by mouth daily or as directed by the coumadin clinic                                  Discharge Instructions       Chronic Diarrhea  Diarrhea is frequent loose and watery bowel movements. It can cause you to feel weak and dehydrated. Dehydration can cause you to become tired and thirsty and to have a dry mouth, decreased urination, and dark yellow urine. Diarrhea is a sign of another problem, most often an infection that will not last long. In most cases, diarrhea lasts 2-3 days. Diarrhea that lasts longer than 4 weeks is called long-lasting (chronic) diarrhea. It is important to treat your diarrhea as directed by your health care provider to lessen or prevent future episodes of diarrhea.   CAUSES   There are many causes of chronic diarrhea. The following are some possible causes:   · Gastrointestinal infections caused by viruses, bacteria, or parasites.    · Food poisoning or food allergies.    · Certain medicines, such as antibiotics, chemotherapy, and laxatives.    · Artificial sweeteners and fructose.    · Digestive disorders, such as celiac disease and inflammatory bowel diseases.    · Irritable bowel syndrome.  · Some disorders of the pancreas.  · Disorders of the thyroid.  · Reduced blood flow to the intestines.  · Cancer.  Sometimes the cause of chronic diarrhea is unknown.  RISK FACTORS  · Having a severely weakened immune system, such as from HIV or AIDS.    · Taking certain types of cancer-fighting drugs (such as with chemotherapy) or other medicines.    · Having had a recent organ transplant.    · Having a portion of the stomach or small bowel removed.    · Traveling to countries where food and water supplies are often contaminated.    SYMPTOMS   In addition to frequent, loose stools, diarrhea may cause:   · Cramping.    · Abdominal pain.    · Nausea.    · Fever.  · Fatigue.  · Urgent need to use the  bathroom.  · Loss of bowel control.  DIAGNOSIS   Your health care provider must take a careful history and perform a physical exam. Tests given are based on your symptoms and history. Tests may include:   · Blood or stool tests. Three or more stool samples may be examined. Stool cultures may be used to test for bacteria or parasites.    · X-rays.    · A procedure in which a thin tube is inserted into the mouth or rectum (endoscopy). This allows the health care provider to look inside the intestine.    TREATMENT   · Treatment is aimed at correcting the cause of the diarrhea when possible.  · Diarrhea caused by an infection can often be treated with antibiotic medicines.  · Diarrhea not caused by an infection may require you to take long-term medicine or have surgery. Specific treatment should be discussed with your health care provider.  · If the cause cannot be determined, treatment aims to relieve symptoms and prevent dehydration. Serious health problems can occur if you do not maintain proper fluid levels. Treatment may include:  ¨ Taking an oral rehydration solution (ORS).  ¨ Not drinking beverages that contain caffeine (such as tea, coffee, and soft drinks).  ¨ Not drinking alcohol.  ¨ Maintaining well-balanced nutrition to help you recover faster.  HOME CARE INSTRUCTIONS   · Drink enough fluids to keep urine clear or pale yellow. Drink 1 cup (8 oz) of fluid for each diarrhea episode. Avoid fluids that contain simple sugars, fruit juices, whole milk products, and sodas. Hydrate with an ORS. You may purchase the ORS or prepare it at home by mixing the following ingredients together:  ¨  - tsp (1.7-3  mL) table salt.  ¨ ¾ tsp (3 ¾ mL) baking soda.  ¨  tsp (1.7 mL) salt substitute containing potassium chloride.  ¨ 1 tbsp (20 mL) sugar.  ¨ 4.2 c (1 L) of water.    · Certain foods and beverages may increase the speed at which food moves through the gastrointestinal (GI) tract. These foods and beverages should be  avoided. They include:  ¨ Caffeinated and alcoholic beverages.  ¨ High-fiber foods, such as raw fruits and vegetables, nuts, seeds, and whole grain breads and cereals.  ¨ Foods and beverages sweetened with sugar alcohols, such as xylitol, sorbitol, and mannitol.    · Some foods may be well tolerated and may help thicken stool. These include:  ¨ Starchy foods, such as rice, toast, pasta, low-sugar cereal, oatmeal, grits, baked potatoes, crackers, and bagels.  ¨ Bananas.  ¨ Applesauce.  · Add probiotic-rich foods to help increase healthy bacteria in the GI tract. These include yogurt and fermented milk products.  · Wash your hands well after each diarrhea episode.  · Only take over-the-counter or prescription medicines as directed by your health care provider.  · Take a warm bath to relieve any burning or pain from frequent diarrhea episodes.  SEEK MEDICAL CARE IF:   · You are not urinating as often.  · Your urine is a dark color.  · You become very tired or dizzy.  · You have severe pain in the abdomen or rectum.  · Your have blood or pus in your stools.  · Your stools look black and tarry.  SEEK IMMEDIATE MEDICAL CARE IF:   · You are unable to keep fluids down.  · You have persistent vomiting.  · You have blood in your stool.  · Your stools are black and tarry.  · You do not urinate in 6-8 hours, or there is only a small amount of very dark urine.  · You have abdominal pain that increases or localizes.  · You have weakness, dizziness, confusion, or lightheadedness.  · You have a severe headache.  · Your diarrhea gets worse or does not get better.  · You have a fever or persistent symptoms for more than 2-3 days.  · You have a fever and your symptoms suddenly get worse.  MAKE SURE YOU:   · Understand these instructions.  · Will watch your condition.  · Will get help right away if you are not doing well or get worse.     This information is not intended to replace advice given to you by your health care provider. Make  sure you discuss any questions you have with your health care provider.     Document Released: 03/09/2005 Document Revised: 12/23/2014 Document Reviewed: 06/12/2014  [employer] Interactive Patient Education ©2016 [employer]    Diet for Irritable Bowel Syndrome  When you have irritable bowel syndrome (IBS), the foods you eat and your eating habits are very important. IBS may cause various symptoms, such as abdominal pain, constipation, or diarrhea. Choosing the right foods can help ease discomfort caused by these symptoms. Work with your health care provider and dietitian to find the best eating plan to help control your symptoms.  WHAT GENERAL GUIDELINES DO I NEED TO FOLLOW?  · Keep a food diary. This will help you identify foods that cause symptoms. Write down:  ¨ What you eat and when.  ¨ What symptoms you have.  ¨ When symptoms occur in relation to your meals.  · Avoid foods that cause symptoms. Talk with your dietitian about other ways to get the same nutrients that are in these foods.  · Eat more foods that contain fiber. Take a fiber supplement if directed by your dietitian.  · Eat your meals slowly, in a relaxed setting.  · Aim to eat 5-6 small meals per day. Do not skip meals.  · Drink enough fluids to keep your urine clear or pale yellow.  · Ask your health care provider if you should take an over-the-counter probiotic during flare-ups to help restore healthy gut bacteria.  · If you have cramping or diarrhea, try making your meals low in fat and high in carbohydrates. Examples of carbohydrates are pasta, rice, whole grain breads and cereals, fruits, and vegetables.  · If dairy products cause your symptoms to flare up, try eating less of them. You might be able to handle yogurt better than other dairy products because it contains bacteria that help with digestion.  WHAT FOODS ARE NOT RECOMMENDED?  The following are some foods and drinks that may worsen your symptoms:  · Fatty foods, such as Slovak  fries.  · Milk products, such as cheese or ice cream.  · Chocolate.  · Alcohol.  · Products with caffeine, such as coffee.  · Carbonated drinks, such as soda.  The items listed above may not be a complete list of foods and beverages to avoid. Contact your dietitian for more information.  WHAT FOODS ARE GOOD SOURCES OF FIBER?  Your health care provider or dietitian may recommend that you eat more foods that contain fiber. Fiber can help reduce constipation and other IBS symptoms. Add foods with fiber to your diet a little at a time so that your body can get used to them. Too much fiber at once might cause gas and swelling of your abdomen. The following are some foods that are good sources of fiber:  · Apples.  · Peaches.  · Pears.  · Berries.  · Figs.  · Broccoli (raw).  · Cabbage.  · Carrots.  · Raw peas.  · Kidney beans.  · Lima beans.  · Whole grain bread.  · Whole grain cereal.  FOR MORE INFORMATION   International Foundation for Functional Gastrointestinal Disorders: www.iffgd.org  National [place] of Diabetes and Digestive and Kidney Diseases: www.niddk.nih.gov/health-information/health-topics/digestive-diseases/ibs/Pages/facts.aspx     This information is not intended to replace advice given to you by your health care provider. Make sure you discuss any questions you have with your health care provider.     Document Released: 03/09/2005 Document Revised: 01/08/2016 Document Reviewed: 03/20/2015  [employer]vier Interactive Patient Education ©2016 [employer] Inc.            Patient Information     Patient Information    Following emergency treatment: all patient requiring follow-up care must return either to a private physician or a clinic if your condition worsens before you are able to obtain further medical attention, please return to the emergency room.     Billing Information    At [facility], we work to make the billing process streamlined for our patients.  Our Representatives are here to answer any  questions you may have regarding your hospital bill.  If you have insurance coverage and have supplied your insurance information to us, we will submit a claim to your insurer on your behalf.  Should you have any questions regarding your bill, we can be reached online or by phone as follows:  Online: You are able pay your bills online or live chat with our representatives about any billing questions you may have. We are here to help Monday - Friday from 8:00am to 7:30pm and 9:00am - 12:00pm on Saturdays.  Please visit [email or web address]  for more information.   Phone:  [phone] or [phone]    Please note that your emergency physician, surgeon, pathologist, radiologist, anesthesiologist, and other specialists are not employed by [facility] and will therefore bill separately for their services.  Please contact them directly for any questions concerning their bills at the numbers below:     Emer[facility] Physician Services:  [phone]  [facility]:  [phone]  [facility]:  [phone]  [facility]:  [phone]    1. Your final bill may vary from the amount quoted upon discharge if all procedures are not complete at that time, or if your doctor has additional procedures of which we are not aware. You will receive an additional bill if you return to the Emergency Department at [facility] for suture removal regardless of the facility of which the sutures were placed.     2. Please arrange for settlement of this account at the emergency registration.    3. All self-pay accounts are due in full at the time of treatment.  If you are unable to meet this obligation then payment is expected within 4-5 days.     4. If you have had radiology studies (CT, X-ray, Ultrasound, MRI), you have received a preliminary result during your emergency department visit. Please contact the radiology department [phone] to receive a copy of  your final result. Please discuss the Final result with your primary physician or with the follow up physician provided.     Crisis Hotline:  [place] Crisis Hotline:  6-817-NJRQVPA or 1-881.790.3050  [place] Crisis Hotline:    1-562.762.6569 or 582-131-4969         ED Discharge Follow Up Questions    1. In order to provide you with very good care, we would like to follow up with a phone call in the next few days.  May we have your permission to contact you?     YES /  NO    2. What is the best phone number to call you? (       )_____-__________    3. What is the best time to call you?      Morning  /  Afternoon  /  Evening                   Patient Signature:  ____________________________________________________________    Date:  ____________________________________________________________      Your appointments     Jul 12, 2017  3:00 PM   ANNUAL EXAM PREVENTATIVE with HARVINDER Treadwell   [facility]    [address]  [place] [address]   [phone]

## 2017-06-29 DIAGNOSIS — R19.7 DIARRHEA OF PRESUMED INFECTIOUS ORIGIN: ICD-10-CM

## 2017-06-29 RX ORDER — DIPHENOXYLATE HYDROCHLORIDE AND ATROPINE SULFATE 2.5; .025 MG/1; MG/1
1 TABLET ORAL 3 TIMES DAILY PRN
Qty: 45 TAB | Refills: 0 | Status: SHIPPED
Start: 2017-06-29 | End: 2017-07-12

## 2017-07-11 ENCOUNTER — TELEPHONE (OUTPATIENT)
Dept: MEDICAL GROUP | Facility: MEDICAL CENTER | Age: 82
End: 2017-07-11

## 2017-07-11 NOTE — TELEPHONE ENCOUNTER
Pt daughter came into the clinic and states pt has apt tomorrow. Daughter states she thinks the pt may be developing dementia. pts son will be with her  But not during the apt. The daughter is asking if anything that appears concerning to inform the son or herself. The daughter(Christiana) is also requesting a copy of the office not. i looked on the chart and the daughter has authorization to discuss health info.

## 2017-07-12 ENCOUNTER — OFFICE VISIT (OUTPATIENT)
Dept: MEDICAL GROUP | Facility: MEDICAL CENTER | Age: 82
End: 2017-07-12
Payer: MEDICARE

## 2017-07-12 VITALS
HEART RATE: 70 BPM | WEIGHT: 126 LBS | DIASTOLIC BLOOD PRESSURE: 88 MMHG | TEMPERATURE: 98.9 F | HEIGHT: 62 IN | SYSTOLIC BLOOD PRESSURE: 130 MMHG | OXYGEN SATURATION: 92 % | BODY MASS INDEX: 23.19 KG/M2

## 2017-07-12 DIAGNOSIS — R19.7 DIARRHEA, UNSPECIFIED TYPE: ICD-10-CM

## 2017-07-12 DIAGNOSIS — I10 ESSENTIAL HYPERTENSION: ICD-10-CM

## 2017-07-12 DIAGNOSIS — E78.5 HYPERLIPIDEMIA LDL GOAL <130: ICD-10-CM

## 2017-07-12 DIAGNOSIS — E55.9 VITAMIN D DEFICIENCY: ICD-10-CM

## 2017-07-12 DIAGNOSIS — Z79.899 HIGH RISK MEDICATION USE: ICD-10-CM

## 2017-07-12 PROCEDURE — 99214 OFFICE O/P EST MOD 30 MIN: CPT | Performed by: NURSE PRACTITIONER

## 2017-07-12 NOTE — PROGRESS NOTES
Subjective:      Fabien Linares is a 88 y.o. female who presents with diarrhea         Annual Exam      Seen in f/u for diarrhea.  Saw pt with son.  She was having diarrhea for the last 2 weeks.  She was seen previously and started on lomotil.  That helped until she ran out.  Then she saw [facility].  They have ordered lots of testing.  Results are pending but pt reports that she has done tests.  She hasn't  Had any blood drawn though.  We called [facility] and they have not received any test specimens.     She has had normal bm's for the last 2 days. It was previously black and tarry.  No back pain.  Did have abd pain.    Pain was sometimes severe ALL Over abd.    She has not had any diarrhea for last 2 days.    She is only eating rice and bananas.  She drinks adequate water.  She has a nodule on scalp.  Been ther long term.  No infection or changes.    She and family are ready to do the POLST.  WILL set appt.  She is due routine lab to check her chol.  She is on lipitor.  Also due recheck d.  We will reorder all tests recommended by GI.      Patient Active Problem List    Diagnosis Date Noted   • Diarrhea of presumed infectious origin 06/14/2017   • Preventative health care 03/05/2014   • GERD (gastroesophageal reflux disease)    • Bell's palsy    • Osteoporosis    • Colon polyps    • Atrial fibrillation with RVR (CMS-[facility]) 01/08/2014     Current Outpatient Prescriptions   Medication Sig Dispense Refill   • warfarin (COUMADIN) 5 MG Tab Take 5-7.5 mg by mouth every day. Patient takes:  5mg on Monday & Fridays  7.5mg All other days     • metoprolol SR (TOPROL XL) 25 MG TABLET SR 24 HR Take 0.5 Tabs by mouth every bedtime. 45 Tab 3   • atorvastatin (LIPITOR) 40 MG Tab Take 1 Tab by mouth every bedtime. 30 Tab 11     No current facility-administered medications for this visit.     Allergies   Allergen Reactions   • Actonel [Risedronate Sodium]      Leg pain   • Amoxicillin    • Morphine Sulfate      Patient became A Fib   •  "Naproxen      unknown rxn; doctor said not to take   • Vicodin [Hydrocodone-Acetaminophen]      Unknown rxn; doctor said not to take       ROS  Review of Systems   Constitutional: Negative.  Negative for fever, chills, weight loss, malaise/fatigue and diaphoresis.   HENT: Negative.  Negative for hearing loss, ear pain, nosebleeds, congestion, sore throat, neck pain, tinnitus and ear discharge.    Respiratory: Negative.  Negative for cough, hemoptysis, sputum production, shortness of breath, wheezing and stridor.    Cardiovascular: Negative.  Negative for chest pain, palpitations, orthopnea, claudication, leg swelling and PND.   Gastrointestinal: denies nausea, vomiting, constipation, heartburn, melena or hematochezia.  Genitourinary: Denies dysuria, hematuria, urinary incontinence, frequency or urgency.           Objective:     /88 mmHg  Pulse 70  Temp(Src) 37.2 °C (98.9 °F)  Ht 1.562 m (5' 1.5\")  Wt 57.153 kg (126 lb)  BMI 23.42 kg/m2  SpO2 92%  Breastfeeding? No     Physical Exam      Physical Exam   Vitals reviewed.  Constitutional: oriented to person, place, and time. appears well-developed and well-nourished. No distress.   HENT:  Head: Normocephalic and atraumatic. Right Ear: External ear normal. Left Ear: External ear normal. Nose: Nose normal. Mouth/Throat: Oropharynx is clear and moist. No oropharyngeal exudate.  bridgette tm wnl.  Eyes: Right eye exhibits no discharge. Left eye exhibits no discharge. No scleral icterus.  Neck: No JVD present.  Cardiovascular: Normal rate, regular rhythm, normal heart sounds and intact distal pulses.  Exam reveals no gallop and no friction rub.  No murmur heard.  No carotid bruits.   Pulmonary/Chest: Effort normal and breath sounds normal. No stridor. No respiratory distress. no wheezes or rales. exhibits no tenderness.   Musculoskeletal: Normal range of motion. exhibits no edema. bridgette pedal pulses 2+.  Lymphadenopathy: no cervical or supraclavicular adenopathy. "   Abd:  No CVAT,  Soft,  Bs noted in all quadrants.  No HSM.  No abdominal tenderness.  Neurological: alert and oriented to person, place, and time. exhibits normal muscle tone. Coordination normal.   Skin: Skin is warm and dry. no diaphoresis.   Psychiatric: normal mood and affect. behavior is normal.          Assessment/Plan:       1. Diarrhea, unspecified type  TSH    CBC WITH DIFFERENTIAL    CRP QUANTITIVE (NON-CARDIAC)    WESTERGREN SED RATE    FERRITIN    IRON/TOTAL IRON BIND    VITAMIN B12    FOLATE    MAGNESIUM    PHOSPHORUS    COMP METABOLIC PANEL    CELIAC DISEASE AB PANEL    FREE THYROXINE    this has apparently resolved.  will hold off on doing any stool tests unless it reoccurs.  she has seen [facility].    2. High risk medication use  TSH    COMP METABOLIC PANEL    no further lomotil at this time.     3. Hyperlipidemia LDL goal <130  LIPID PROFILE    COMP METABOLIC PANEL    she is due lab. will do lab and f/u for review.    4. Vitamin D deficiency  VITAMIN D,25 HYDROXY    hx of low vitamin d.  will recheck lab.    5. Essential hypertension  MICROALBUMIN CREAT RATIO URINE    needs to do alb/cr ratio.  DBP sl elevated today.  was normal last month.

## 2017-07-12 NOTE — MR AVS SNAPSHOT
"        Fabien Villatorodameon   2017 3:00 PM   Office Visit   MRN: [id number]    Department:  [facility]   Dept Phone:  [phone]    Description:  Female : 1929   Provider:  HARVINDER Treadwell           Reason for Visit     Annual Exam           Allergies as of 2017     Allergen Noted Reactions    Actonel [Risedronate Sodium] 2010       Leg pain    Amoxicillin 09/15/2012       Morphine Sulfate 2010       Patient became A Fib    Naproxen 2010       unknown rxn; doctor said not to take    Vicodin [Hydrocodone-Acetaminophen] 2010       Unknown rxn; doctor said not to take      You were diagnosed with     Diarrhea, unspecified type   [3844447]       High risk medication use   [050410]       Hyperlipidemia LDL goal <130   [118788]       Vitamin D deficiency   [9858878]       Essential hypertension   [4949767]         Vital Signs     Blood Pressure Pulse Temperature Height Weight Body Mass Index    130/88 mmHg 70 37.2 °C (98.9 °F) 1.562 m (5' 1.5\") 57.153 kg (126 lb) 23.42 kg/m2    Oxygen Saturation Breastfeeding? Smoking Status             92% No Never Smoker          Basic Information     Date Of Birth Sex Race Ethnicity Preferred Language    1929 Female White Non- English      Your appointments     2017  9:45 AM   Established Patient with HARVINDER Treadwell   [facility])    [address]  [facility] [id number] [phone]           You will be receiving a confirmation call a few days before your appointment from our automated call confirmation system.              Problem List              ICD-10-CM Priority Class Noted - Resolved    Atrial fibrillation with RVR (CMS-HCC) I48.91   2014 - Present    GERD (gastroesophageal reflux disease) K21.9   Unknown - Present    Bell's palsy G51.0   Unknown - Present    Osteoporosis M81.0   Unknown - Present    Colon polyps K63.5   Unknown - Present "    [facility] health care Z00.00   3/5/2014 - Present    Diarrhea of presumed infectious origin A09   6/14/2017 - Present      Health Maintenance        Date Due Completion Dates    IMM DTaP/Tdap/Td Vaccine (1 - Tdap) 5/27/1948 ---    PAP SMEAR 5/27/1950 ---    IMM ZOSTER VACCINE 5/27/1989 ---    IMM PNEUMOCOCCAL 65+ (ADULT) LOW/MEDIUM RISK SERIES (1 of 2 - PCV13) 5/27/1994 ---    MAMMOGRAM 5/20/2016 5/20/2015, 4/18/2014, 6/25/2012, 6/22/2011, 7/7/2010, 7/7/2010, 6/22/2009, 6/22/2009, 6/19/2008, 6/19/2008, 3/16/2007, 3/16/2007, 5/22/2006, 4/21/2005    IMM INFLUENZA (1) 9/1/2017 1/9/2014            Current Immunizations     Influenza Vaccine Pediatric 1/9/2014 10:29 AM      Below and/or attached are the medications your provider expects you to take. Review all of your home medications and newly ordered medications with your provider and/or pharmacist. Follow medication instructions as directed by your provider and/or pharmacist. Please keep your medication list with you and share with your provider. Update the information when medications are discontinued, doses are changed, or new medications (including over-the-counter products) are added; and carry medication information at all times in the event of emergency situations     Allergies:  ACTONEL - (reactions not documented)     AMOXICILLIN - (reactions not documented)     MORPHINE SULFATE - (reactions not documented)     NAPROXEN - (reactions not documented)     VICODIN - (reactions not documented)               Medications  Valid as of: July 12, 2017 -  3:48 PM    Generic Name Brand Name Tablet Size Instructions for use    Atorvastatin Calcium (Tab) LIPITOR 40 MG Take 1 Tab by mouth every bedtime.        Metoprolol Succinate (TABLET SR 24 HR) TOPROL XL 25 MG Take 0.5 Tabs by mouth every bedtime.        Warfarin Sodium (Tab) COUMADIN 5 MG Take 5-7.5 mg by mouth every day. Patient takes:  5mg on Monday & Fridays  7.5mg All other days        .                    Medicines prescribed today were sent to:     [facility] - [place] (), [place] - [address]    [address] () [place] [address]    Phone: [phone] Fax: [phone]    Open 24 Hours?: No      Medication refill instructions:       If your prescription bottle indicates you have medication refills left, it is not necessary to call your provider’s office. Please contact your pharmacy and they will refill your medication.    If your prescription bottle indicates you do not have any refills left, you may request refills at any time through one of the following ways: The online [employer] system (except Urgent Care), by calling your provider’s office, or by asking your pharmacy to contact your provider’s office with a refill request. Medication refills are processed only during regular business hours and may not be available until the next business day. Your provider may request additional information or to have a follow-up visit with you prior to refilling your medication.   *Please Note: Medication refills are assigned a new Rx number when refilled electronically. Your pharmacy may indicate that no refills were authorized even though a new prescription for the same medication is available at the pharmacy. Please request the medicine by name with the pharmacy before contacting your provider for a refill.        Your To Do List     Future Labs/Procedures Complete By Expires    CBC WITH DIFFERENTIAL  As directed 7/13/2018    CELIAC DISEASE AB PANEL  As directed 7/13/2018    COMP METABOLIC PANEL  As directed 7/13/2018    CRP QUANTITIVE (NON-CARDIAC)  As directed 7/13/2018    FERRITIN  As directed 7/13/2018    FOLATE  As directed 7/13/2018    FREE THYROXINE  As directed 7/13/2018    IRON/TOTAL IRON BIND  As directed 7/13/2018    LIPID PROFILE  As directed 7/13/2018    MAGNESIUM  As directed 7/12/2018    MICROALBUMIN CREAT RATIO URINE  As directed 7/13/2018    PHOSPHORUS  As directed 7/12/2018    TSH   As directed 7/13/2018    VITAMIN B12  As directed 7/13/2018    VITAMIN D,25 HYDROXY  As directed 7/13/2018    WESTERGREN SED RATE  As directed 7/13/2018         [employer] Access Code: Activation code not generated  Current [employer] Status: Active

## 2017-07-14 ENCOUNTER — TELEPHONE (OUTPATIENT)
Dept: MEDICAL GROUP | Facility: MEDICAL CENTER | Age: 82
End: 2017-07-14

## 2017-07-14 ENCOUNTER — HOSPITAL ENCOUNTER (OUTPATIENT)
Dept: LAB | Facility: MEDICAL CENTER | Age: 82
End: 2017-07-14
Attending: NURSE PRACTITIONER
Payer: MEDICARE

## 2017-07-14 DIAGNOSIS — E78.5 HYPERLIPIDEMIA LDL GOAL <130: ICD-10-CM

## 2017-07-14 DIAGNOSIS — Z79.899 HIGH RISK MEDICATION USE: ICD-10-CM

## 2017-07-14 DIAGNOSIS — E55.9 VITAMIN D DEFICIENCY: ICD-10-CM

## 2017-07-14 DIAGNOSIS — I10 ESSENTIAL HYPERTENSION: ICD-10-CM

## 2017-07-14 DIAGNOSIS — R19.7 DIARRHEA, UNSPECIFIED TYPE: ICD-10-CM

## 2017-07-14 LAB
25(OH)D3 SERPL-MCNC: 52 NG/ML (ref 30–100)
ALBUMIN SERPL BCP-MCNC: 4 G/DL (ref 3.2–4.9)
ALBUMIN/GLOB SERPL: 1.9 G/DL
ALP SERPL-CCNC: 72 U/L (ref 30–99)
ALT SERPL-CCNC: 30 U/L (ref 2–50)
ANION GAP SERPL CALC-SCNC: 7 MMOL/L (ref 0–11.9)
AST SERPL-CCNC: 33 U/L (ref 12–45)
BASOPHILS # BLD AUTO: 1.1 % (ref 0–1.8)
BASOPHILS # BLD: 0.04 K/UL (ref 0–0.12)
BILIRUB SERPL-MCNC: 1 MG/DL (ref 0.1–1.5)
BUN SERPL-MCNC: 13 MG/DL (ref 8–22)
CALCIUM SERPL-MCNC: 9.1 MG/DL (ref 8.5–10.5)
CHLORIDE SERPL-SCNC: 107 MMOL/L (ref 96–112)
CHOLEST SERPL-MCNC: 137 MG/DL (ref 100–199)
CO2 SERPL-SCNC: 26 MMOL/L (ref 20–33)
CREAT SERPL-MCNC: 0.67 MG/DL (ref 0.5–1.4)
CREAT UR-MCNC: 103 MG/DL
CRP SERPL HS-MCNC: 0.2 MG/DL (ref 0–0.75)
EOSINOPHIL # BLD AUTO: 0.16 K/UL (ref 0–0.51)
EOSINOPHIL NFR BLD: 4.3 % (ref 0–6.9)
ERYTHROCYTE [DISTWIDTH] IN BLOOD BY AUTOMATED COUNT: 47.8 FL (ref 35.9–50)
ERYTHROCYTE [SEDIMENTATION RATE] IN BLOOD BY WESTERGREN METHOD: 13 MM/HOUR (ref 0–30)
FERRITIN SERPL-MCNC: 315.9 NG/ML (ref 10–291)
FOLATE SERPL-MCNC: 15.9 NG/ML
GFR SERPL CREATININE-BSD FRML MDRD: >60 ML/MIN/1.73 M 2
GLOBULIN SER CALC-MCNC: 2.1 G/DL (ref 1.9–3.5)
GLUCOSE SERPL-MCNC: 83 MG/DL (ref 65–99)
HCT VFR BLD AUTO: 41.9 % (ref 37–47)
HDLC SERPL-MCNC: 48 MG/DL
HGB BLD-MCNC: 13.7 G/DL (ref 12–16)
IMM GRANULOCYTES # BLD AUTO: 0.01 K/UL (ref 0–0.11)
IMM GRANULOCYTES NFR BLD AUTO: 0.3 % (ref 0–0.9)
IRON SATN MFR SERPL: 21 % (ref 15–55)
IRON SERPL-MCNC: 55 UG/DL (ref 40–170)
LDLC SERPL CALC-MCNC: 73 MG/DL
LYMPHOCYTES # BLD AUTO: 0.9 K/UL (ref 1–4.8)
LYMPHOCYTES NFR BLD: 24.4 % (ref 22–41)
MAGNESIUM SERPL-MCNC: 2.1 MG/DL (ref 1.5–2.5)
MCH RBC QN AUTO: 32.6 PG (ref 27–33)
MCHC RBC AUTO-ENTMCNC: 32.7 G/DL (ref 33.6–35)
MCV RBC AUTO: 99.8 FL (ref 81.4–97.8)
MICROALBUMIN UR-MCNC: <0.7 MG/DL
MICROALBUMIN/CREAT UR: NORMAL MG/G (ref 0–30)
MONOCYTES # BLD AUTO: 0.52 K/UL (ref 0–0.85)
MONOCYTES NFR BLD AUTO: 14.1 % (ref 0–13.4)
NEUTROPHILS # BLD AUTO: 2.06 K/UL (ref 2–7.15)
NEUTROPHILS NFR BLD: 55.8 % (ref 44–72)
NRBC # BLD AUTO: 0 K/UL
NRBC BLD AUTO-RTO: 0 /100 WBC
PHOSPHATE SERPL-MCNC: 2.2 MG/DL (ref 2.5–4.5)
PLATELET # BLD AUTO: 141 K/UL (ref 164–446)
PMV BLD AUTO: 10.3 FL (ref 9–12.9)
POTASSIUM SERPL-SCNC: 3.7 MMOL/L (ref 3.6–5.5)
PROT SERPL-MCNC: 6.1 G/DL (ref 6–8.2)
RBC # BLD AUTO: 4.2 M/UL (ref 4.2–5.4)
SODIUM SERPL-SCNC: 140 MMOL/L (ref 135–145)
T4 FREE SERPL-MCNC: 0.77 NG/DL (ref 0.53–1.43)
TIBC SERPL-MCNC: 265 UG/DL (ref 250–450)
TRIGL SERPL-MCNC: 81 MG/DL (ref 0–149)
TSH SERPL DL<=0.005 MIU/L-ACNC: 3.5 UIU/ML (ref 0.3–3.7)
VIT B12 SERPL-MCNC: >1500 PG/ML (ref 211–911)
WBC # BLD AUTO: 3.7 K/UL (ref 4.8–10.8)

## 2017-07-14 PROCEDURE — 82043 UR ALBUMIN QUANTITATIVE: CPT

## 2017-07-14 PROCEDURE — 84439 ASSAY OF FREE THYROXINE: CPT

## 2017-07-14 PROCEDURE — 83516 IMMUNOASSAY NONANTIBODY: CPT

## 2017-07-14 PROCEDURE — 82306 VITAMIN D 25 HYDROXY: CPT

## 2017-07-14 PROCEDURE — 80053 COMPREHEN METABOLIC PANEL: CPT

## 2017-07-14 PROCEDURE — 82607 VITAMIN B-12: CPT

## 2017-07-14 PROCEDURE — 82570 ASSAY OF URINE CREATININE: CPT

## 2017-07-14 PROCEDURE — 85652 RBC SED RATE AUTOMATED: CPT

## 2017-07-14 PROCEDURE — 84100 ASSAY OF PHOSPHORUS: CPT

## 2017-07-14 PROCEDURE — 82784 ASSAY IGA/IGD/IGG/IGM EACH: CPT

## 2017-07-14 PROCEDURE — 83540 ASSAY OF IRON: CPT | Mod: GA

## 2017-07-14 PROCEDURE — 84443 ASSAY THYROID STIM HORMONE: CPT

## 2017-07-14 PROCEDURE — 85025 COMPLETE CBC W/AUTO DIFF WBC: CPT

## 2017-07-14 PROCEDURE — 86140 C-REACTIVE PROTEIN: CPT

## 2017-07-14 PROCEDURE — 36415 COLL VENOUS BLD VENIPUNCTURE: CPT

## 2017-07-14 PROCEDURE — 80061 LIPID PANEL: CPT

## 2017-07-14 PROCEDURE — 82728 ASSAY OF FERRITIN: CPT | Mod: GA

## 2017-07-14 PROCEDURE — 83550 IRON BINDING TEST: CPT | Mod: GA

## 2017-07-14 PROCEDURE — 83735 ASSAY OF MAGNESIUM: CPT

## 2017-07-14 PROCEDURE — 82746 ASSAY OF FOLIC ACID SERUM: CPT

## 2017-07-14 NOTE — TELEPHONE ENCOUNTER
Will review test results with pt at upcoming appt.  Her po4 is low.  Have her start mvi with phosphorus in it.

## 2017-07-15 LAB
IGA SERPL-MCNC: 115 MG/DL (ref 68–408)
TTG IGA SER IA-ACNC: 0 U/ML (ref 0–3)

## 2017-07-17 ENCOUNTER — TELEPHONE (OUTPATIENT)
Dept: MEDICAL GROUP | Facility: MEDICAL CENTER | Age: 82
End: 2017-07-17

## 2017-07-17 NOTE — TELEPHONE ENCOUNTER
Pt called back and reports she did a stool test on 6/27/17 with Munira Dhaliwal and will send the results to our office.

## 2017-07-17 NOTE — TELEPHONE ENCOUNTER
Pt called back and requested I get results from [facility]. Spoke with [facility] and all they have results for are a FIT test and blood tests she did on 7/14 at [facility]. It looks like pt did not have other stool tests completed.     Left message for pt to call back to notify her that she needs to complete stool tests.

## 2017-07-17 NOTE — TELEPHONE ENCOUNTER
1. Caller Name: Pt                      Call Back Number: [phone]    2. Message: Pt called stating she would like an rx for Lomotil for her diarrhea. She is bringing in a copy of her lab results from GI.     3. Patient approves office to leave a detailed voicemail/MyChart message: yes

## 2017-07-20 ENCOUNTER — TELEPHONE (OUTPATIENT)
Dept: MEDICAL GROUP | Facility: MEDICAL CENTER | Age: 82
End: 2017-07-20

## 2017-08-14 RX ORDER — WARFARIN SODIUM 5 MG/1
TABLET ORAL
Refills: 1 | OUTPATIENT
Start: 2017-08-14

## 2017-08-14 NOTE — TELEPHONE ENCOUNTER
8/14/2017    Received refill request for warfarin. No INR since March. Left message for patient's daughter to contact clinic to discuss follow up.    Mitchell Hernandez, PHARMD

## 2017-08-15 RX ORDER — WARFARIN SODIUM 5 MG/1
5-7.5 TABLET ORAL DAILY
Qty: 30 TAB | OUTPATIENT
Start: 2017-08-15

## 2017-08-23 DIAGNOSIS — I48.91 ATRIAL FIBRILLATION WITH RVR (HCC): ICD-10-CM

## 2017-08-23 RX ORDER — WARFARIN SODIUM 5 MG/1
TABLET ORAL
Qty: 15 TAB | Refills: 0 | Status: SHIPPED | OUTPATIENT
Start: 2017-08-23 | End: 2017-08-30 | Stop reason: SDUPTHER

## 2017-08-30 ENCOUNTER — ANTICOAGULATION VISIT (OUTPATIENT)
Dept: VASCULAR LAB | Facility: MEDICAL CENTER | Age: 82
End: 2017-08-30
Attending: INTERNAL MEDICINE
Payer: MEDICARE

## 2017-08-30 VITALS — HEART RATE: 56 BPM | SYSTOLIC BLOOD PRESSURE: 140 MMHG | DIASTOLIC BLOOD PRESSURE: 88 MMHG

## 2017-08-30 DIAGNOSIS — I48.91 ATRIAL FIBRILLATION WITH RVR (HCC): ICD-10-CM

## 2017-08-30 LAB
INR BLD: 3.9 (ref 0.9–1.2)
INR PPP: 3.9 (ref 2–3.5)
INR PPP: 3.9 (ref 2–3.5)

## 2017-08-30 PROCEDURE — 99212 OFFICE O/P EST SF 10 MIN: CPT | Performed by: NURSE PRACTITIONER

## 2017-08-30 PROCEDURE — 85610 PROTHROMBIN TIME: CPT

## 2017-08-30 RX ORDER — WARFARIN SODIUM 5 MG/1
TABLET ORAL
Qty: 30 TAB | Refills: 1 | Status: SHIPPED | OUTPATIENT
Start: 2017-08-30 | End: 2017-09-27 | Stop reason: SDUPTHER

## 2017-08-30 NOTE — PROGRESS NOTES
Anticoagulation Summary  As of 8/30/2017    INR goal:   2.0-3.0   TTR:   66.1 % (2.2 y)   Today's INR:   3.9!   Maintenance plan:   7.5 mg (5 mg x 1.5) on Mon; 5 mg (5 mg x 1) all other days   Weekly total:   37.5 mg   Plan last modified:   HARVINDER Shine (8/30/2017)   Next INR check:   9/13/2017   Target end date:   Indefinite    Indications    Atrial fibrillation with RVR (CMS-HCC) [I48.91]             Anticoagulation Episode Summary     [facility]   [facility]    Preferred lab:       Send INR reminders to:       Comments:   Patient had LTTR- consider alternative      Anticoagulation Care Providers     Provider Role Specialty Phone number    Ines Maguire M.D. Referring Cardiology [phone]    Joseline Nash M.D. Referring Family Medicine [phone]    [facility] Anticoagulation Services Responsible  [phone]        Patient is supratherapeutic today. Pt's last INR in March. She had a chronically ill son she has been caring for. She is eating less greens. Denies any medication changes. Confirmed her dose. No current symptoms of bleeding or thrombosis reported. Will decrease regimen. Follow up in 2 weeks. Pt understands importance of regular follow up and will be better about returning.    Next Appointment: Wednesday, September 13, 2017 at 10:00 am.     Joseline VELAZQUEZ

## 2017-09-13 ENCOUNTER — APPOINTMENT (OUTPATIENT)
Dept: VASCULAR LAB | Facility: MEDICAL CENTER | Age: 82
End: 2017-09-13
Payer: MEDICARE

## 2017-09-27 ENCOUNTER — ANTICOAGULATION VISIT (OUTPATIENT)
Dept: VASCULAR LAB | Facility: MEDICAL CENTER | Age: 82
End: 2017-09-27
Attending: INTERNAL MEDICINE
Payer: MEDICARE

## 2017-09-27 VITALS — SYSTOLIC BLOOD PRESSURE: 136 MMHG | HEART RATE: 64 BPM | DIASTOLIC BLOOD PRESSURE: 80 MMHG

## 2017-09-27 DIAGNOSIS — I48.91 ATRIAL FIBRILLATION WITH RVR (HCC): ICD-10-CM

## 2017-09-27 LAB
INR BLD: 1.6 (ref 0.9–1.2)
INR PPP: 1.6 (ref 2–3.5)

## 2017-09-27 PROCEDURE — 99212 OFFICE O/P EST SF 10 MIN: CPT | Performed by: NURSE PRACTITIONER

## 2017-09-27 PROCEDURE — 85610 PROTHROMBIN TIME: CPT

## 2017-09-27 RX ORDER — WARFARIN SODIUM 5 MG/1
TABLET ORAL
Qty: 45 TAB | Refills: 2 | Status: SHIPPED | OUTPATIENT
Start: 2017-09-27 | End: 2018-02-11 | Stop reason: SDUPTHER

## 2017-09-27 NOTE — PROGRESS NOTES
Anticoagulation Summary  As of 9/27/2017    INR goal:   2.0-3.0   TTR:   65.4 % (2.3 y)   Today's INR:   1.6!   Maintenance plan:   7.5 mg (5 mg x 1.5) on Mon; 5 mg (5 mg x 1) all other days   Weekly total:   37.5 mg   Plan last modified:   HARVINDER Shine (8/30/2017)   Next INR check:   10/11/2017   Target end date:   Indefinite    Indications    Atrial fibrillation with RVR (CMS-HCC) [I48.91]             Anticoagulation Episode Summary     [facility]   [facility]    Preferred lab:       Send INR reminders to:       Comments:   Patient had LTTR- consider alternative      Anticoagulation Care Providers     Provider Role Specialty Phone number    Ines Maguire M.D. Referring Cardiology [phone]    Joseline Nash M.D. Referring Family Medicine [phone]    [facility] Antico[facility] Responsible  [phone]        Anticoagulation Patient Findings      HPI:  Fabien Linares seen in clinic today for follow up on anticoagulation therapy in the presence of AF.Her  recently passed away. She has missed at least one dose recently. Denies any changes to current medical/health status since last appointment. Denies any medication or diet changes. No current symptoms of bleeding or thrombosis reported.    A/P:   INR subtherapeutic. Will increase tonight's dose then continue current regimen. BP recorded in vitals.    Follow up appointment in 2 week(s).    Next Appointment: Wednesday, October 11, 2017 at 11:00 am.     Joseline VELAZQUEZ

## 2017-10-04 ENCOUNTER — HOSPITAL ENCOUNTER (EMERGENCY)
Facility: MEDICAL CENTER | Age: 82
End: 2017-10-04
Attending: EMERGENCY MEDICINE
Payer: MEDICARE

## 2017-10-04 VITALS
WEIGHT: 124.34 LBS | DIASTOLIC BLOOD PRESSURE: 96 MMHG | TEMPERATURE: 97.4 F | BODY MASS INDEX: 24.41 KG/M2 | HEART RATE: 63 BPM | OXYGEN SATURATION: 94 % | SYSTOLIC BLOOD PRESSURE: 156 MMHG | HEIGHT: 60 IN | RESPIRATION RATE: 16 BRPM

## 2017-10-04 DIAGNOSIS — R21 RASH: ICD-10-CM

## 2017-10-04 PROCEDURE — 99283 EMERGENCY DEPT VISIT LOW MDM: CPT

## 2017-10-04 RX ORDER — PREDNISONE 20 MG/1
20 TABLET ORAL DAILY
Qty: 3 TAB | Refills: 0 | Status: SHIPPED | OUTPATIENT
Start: 2017-10-04 | End: 2017-10-07

## 2017-10-04 RX ORDER — BENZOCAINE/MENTHOL 6 MG-10 MG
LOZENGE MUCOUS MEMBRANE
Qty: 1 TUBE | Refills: 0 | Status: SHIPPED | OUTPATIENT
Start: 2017-10-04 | End: 2018-02-05

## 2017-10-04 ASSESSMENT — PAIN SCALES - GENERAL: PAINLEVEL_OUTOF10: 0

## 2017-10-04 ASSESSMENT — ENCOUNTER SYMPTOMS
WHEEZING: 0
FEVER: 0

## 2017-10-04 NOTE — ED NOTES
Rash on buttocks and legs. States at times it is itchy and painful. Has had rash for several months but has been primary caregiver for . States   2 weeks ago and now is following up on rash. Primary care Kerry Parish is out of town.

## 2017-10-04 NOTE — DISCHARGE INSTRUCTIONS
You will need to follow up with a re-check of your INR on Friday since the steroids might affect your INR

## 2017-10-04 NOTE — ED NOTES
Pt given written and oral discharge instructions. Pt given follow up instructions. Pt instructed to  her prescriptions from her pharmacy. VSS. No s/s of distress. Pt ambulating independently to lobby.

## 2017-10-04 NOTE — ED PROVIDER NOTES
ED Provider Note    ED Provider Note          CHIEF COMPLAINT  Chief Complaint   Patient presents with   • Rash       HPI  Fabien Linares is a 88 y.o. female who presents to the Emergency Department For a persistent rash. She said this is been going on for quite some time but is not been able to see a provider for it because her  was sick and she was caring for him and he passed away. She says it has been very itchy and mostly located on her low back as well as her buttocks. She says she has very good hygiene and has changed all of her sheets multiple times including the mattress pads, wash all her clothes. She has not had any new exposures to any new household products, clothes, bed sheets or detergents. She's never had anything like this before. She has only tried an over-the-counter topical medication without any relief.    REVIEW OF SYSTEMS  Review of Systems   Constitutional: Negative for fever.   HENT: Negative for congestion.    Respiratory: Negative for wheezing.    Skin: Positive for rash.       PAST MEDICAL HISTORY   has a past medical history of Atrial fibrillation with RVR (CMS-HCC); Bell's palsy; Colon polyps; Diarrhea of infectious origin; GERD (gastroesophageal reflux disease); and Osteoporosis, unspecified.    SURGICAL HISTORY   has a past surgical history that includes appendectomy; inguinal hernia repair; vaginal hysterectomy total; other orthopedic surgery; and lumpectomy.    SOCIAL HISTORY  Social History   Substance Use Topics   • Smoking status: Never Smoker   • Smokeless tobacco: Never Used   • Alcohol use 0.0 oz/week      Comment: occ wine      History   Drug Use No       FAMILY HISTORY  Family History   Problem Relation Age of Onset   • Cancer Sister        CURRENT MEDICATIONS  Reviewed.  See Encounter Summary.     ALLERGIES  Allergies   Allergen Reactions   • Actonel [Risedronate Sodium]      Leg pain   • Amoxicillin    • Morphine Sulfate      Patient became A Fib   • Naproxen       unknown rxn; doctor said not to take   • Vicodin [Hydrocodone-Acetaminophen]      Unknown rxn; doctor said not to take       PHYSICAL EXAM  VITAL SIGNS: /74   Pulse 64   Temp 36.7 °C (98.1 °F)   Resp 16   Ht 1.524 m (5')   Wt 56.4 kg (124 lb 5.4 oz)   SpO2 96%   BMI 24.28 kg/m²   Physical Exam   Constitutional: She is oriented to person, place, and time.   HENT:   Head: Normocephalic and atraumatic.   Eyes: Pupils are equal, round, and reactive to light.   Cardiovascular: Normal rate.    Pulmonary/Chest: Effort normal.   Abdominal: Soft.   Neurological: She is alert and oriented to person, place, and time.   Steady gait   Skin: She is not diaphoretic.   Area on her lower back and buttocks with some erythema from excoriation with some healed scabs on her buttocks with some area of irritation in the lower back but no signs of abscess or other lesions on the body   Psychiatric: She has a normal mood and affect. Her behavior is normal.               COURSE & MEDICAL DECISION MAKING  Pertinent Labs & Imaging studies reviewed. (See chart for details)    10:58 AM - Patient seen and examined at bedside.     Decision Making:  This is a 88 y.o. year old female who presents with with a rash. She presents here in distress. She does have an area of some erythema mostly secondary to excoriation on her lower back without any really discrete lesion she has a few healed scabs on her buttocks as well. It is possible this could just be high and a stress-related rash given her new onset of her husbands death it's possible this still could be a contact dermatitis although she swears that she is not been exposed to new household products. I discussed with her some over-the-counter treatment such as Benadryl if she wanted to try them but did inform her that given her age there to be risks with this medication. We'll try a topical hydrocortisone cream and if it does not improve she can try few days with a low dose of  steroid. She is aware that this can affect her INR and that she needs to follow-up for recheck on Friday with her INR and follow up with her primary care physician.    FINAL IMPRESSION  1. Rash

## 2017-10-11 ENCOUNTER — ANTICOAGULATION VISIT (OUTPATIENT)
Dept: VASCULAR LAB | Facility: MEDICAL CENTER | Age: 82
End: 2017-10-11
Attending: INTERNAL MEDICINE
Payer: MEDICARE

## 2017-10-11 VITALS — DIASTOLIC BLOOD PRESSURE: 76 MMHG | SYSTOLIC BLOOD PRESSURE: 140 MMHG | HEART RATE: 68 BPM

## 2017-10-11 DIAGNOSIS — I48.91 ATRIAL FIBRILLATION WITH RVR (HCC): ICD-10-CM

## 2017-10-11 LAB
INR BLD: 2.2 (ref 0.9–1.2)
INR PPP: 2.2 (ref 2–3.5)

## 2017-10-11 PROCEDURE — 99211 OFF/OP EST MAY X REQ PHY/QHP: CPT | Performed by: NURSE PRACTITIONER

## 2017-10-11 PROCEDURE — 85610 PROTHROMBIN TIME: CPT

## 2017-10-11 NOTE — PROGRESS NOTES
Anticoagulation Summary  As of 10/11/2017    INR goal:   2.0-3.0   TTR:   64.9 % (2.4 y)   Today's INR:   2.2   Maintenance plan:   7.5 mg (5 mg x 1.5) on Mon; 5 mg (5 mg x 1) all other days   Weekly total:   37.5 mg   No change documented:   HARVINDER Shine   Plan last modified:   HARVINDER Shine (8/30/2017)   Next INR check:   10/25/2017   Target end date:   Indefinite    Indications    Atrial fibrillation with RVR (CMS-HCC) [I48.91]             Anticoagulation Episode Summary     [facility]   [facility]    Preferred lab:       Send INR reminders to:       Comments:   Patient had LTTR- consider alternative      Anticoagulation Care Providers     Provider Role Specialty Phone number    Ines Maguire M.D. Referring Cardiology [phone]    Joseline Nash M.D. Referring Family Medicine [phone]    [facility] Anticoagulation Services Responsible  [phone]        Anticoagulation Patient Findings      HPI:  Fabien Linares seen in clinic today for follow up on anticoagulation therapy in the presence of AF. In ER recently with rash. Denies any medication or diet changes. No current symptoms of bleeding or thrombosis reported.    A/P:   INR therapeutic. Continue current regimen. BP recorded in vitals.    Follow up appointment in 2 week(s).    Next Appointment: Wednesday, October 25, 2017 at 11:00 am.    Joseline VELAZQUEZ

## 2017-10-18 ENCOUNTER — OFFICE VISIT (OUTPATIENT)
Dept: MEDICAL GROUP | Facility: MEDICAL CENTER | Age: 82
End: 2017-10-18
Payer: MEDICARE

## 2017-10-18 VITALS
SYSTOLIC BLOOD PRESSURE: 160 MMHG | OXYGEN SATURATION: 97 % | HEIGHT: 60 IN | BODY MASS INDEX: 24.74 KG/M2 | WEIGHT: 126 LBS | HEART RATE: 64 BPM | TEMPERATURE: 97.4 F | DIASTOLIC BLOOD PRESSURE: 80 MMHG

## 2017-10-18 DIAGNOSIS — E83.39 LOW PHOSPHATE LEVELS: ICD-10-CM

## 2017-10-18 DIAGNOSIS — D72.819 LEUKOPENIA, UNSPECIFIED TYPE: ICD-10-CM

## 2017-10-18 DIAGNOSIS — R41.840 DIFFICULTY CONCENTRATING: ICD-10-CM

## 2017-10-18 PROCEDURE — 99214 OFFICE O/P EST MOD 30 MIN: CPT | Performed by: NURSE PRACTITIONER

## 2017-10-18 NOTE — PROGRESS NOTES
Subjective:     Fabien Linares is a 88 y.o. female who presents with No chief complaint on file.  .    HPI:   Seen in f/u for not feeling well and rash.  She was caring for her .  He passed away and she has been stressed d/t that. Her daughter is coming back soon to help care for her.  She was seen in ER for the rash.  It was all over her body.  They gave her prednisone 20 mg x 3 days and steroid cream.  Rash has resolved.  She was last seen for diarrhea.  That has resolved.  All her lab in just was good except low po4 and elevated ferritin.  CBC also showed low wbc.   She is having difficulty concentrating.           Patient Active Problem List    Diagnosis Date Noted   • Diarrhea of presumed infectious origin 06/14/2017   • Preventative health care 03/05/2014   • GERD (gastroesophageal reflux disease)    • Bell's palsy    • Osteoporosis    • Colon polyps    • Atrial fibrillation with RVR (CMS-[facility]) 01/08/2014       Current medicines (including changes today)  Current Outpatient Prescriptions   Medication Sig Dispense Refill   • atorvastatin (LIPITOR) 40 MG Tab Take 1 Tab by mouth every bedtime. Needs to be seen for further refills. Thank you 90 Tab 3   • hydrocortisone 1 % Cream Apply to the affected area twice daily as needed 1 Tube 0   • warfarin (COUMADIN) 5 MG Tab Take 1-1.5 tabs by mouth daily or as directed by coumadin clinic 45 Tab 2   • metoprolol SR (TOPROL XL) 25 MG TABLET SR 24 HR Take 0.5 Tabs by mouth every bedtime. 45 Tab 3     No current facility-administered medications for this visit.        Allergies   Allergen Reactions   • Actonel [Risedronate Sodium]      Leg pain   • Amoxicillin    • Morphine Sulfate      Patient became A Fib   • Naproxen      unknown rxn; doctor said not to take   • Vicodin [Hydrocodone-Acetaminophen]      Unknown rxn; doctor said not to take       ROS  Constitutional: Negative. Negative for fever, chills, weight loss, malaise/fatigue and diaphoresis.   HENT:  Negative. Negative for hearing loss, ear pain, nosebleeds, congestion, sore throat, neck pain, tinnitus and ear discharge.   Respiratory: Negative. Negative for cough, hemoptysis, sputum production, shortness of breath, wheezing and stridor.   Cardiovascular: Negative. Negative for chest pain, palpitations, orthopnea, claudication, leg swelling and PND.   Gastrointestinal: Denies nausea, vomiting, diarrhea, constipation, heartburn, melena or hematochezia.  Genitourinary: Denies dysuria, hematuria, urinary incontinence, frequency or urgency.        Objective:     Blood pressure 160/80, pulse 64, temperature 36.3 °C (97.4 °F), height 1.524 m (5'), weight 57.2 kg (126 lb), SpO2 97 %, not currently breastfeeding. Body mass index is 24.61 kg/m².    Physical Exam:  Vitals reviewed.  Constitutional: Oriented to person, place, and time. appears well-developed and well-nourished. No distress.   Cardiovascular: Normal rate, regular rhythm, normal heart sounds and intact distal pulses. Exam reveals no gallop and no friction rub. No murmur heard. No carotid bruits.   Pulmonary/Chest: Effort normal and breath sounds normal. No stridor. No respiratory distress. no wheezes or rales. exhibits no tenderness.   Musculoskeletal: Normal range of motion. exhibits no edema. bridgette pedal pulses 2+.  Lymphadenopathy: No cervical or supraclavicular adenopathy.   Neurological: Alert and oriented to person, place, and time. exhibits normal muscle tone.  Skin: Skin is warm and dry. No diaphoresis.   Psychiatric: Normal mood and affect. Behavior is normal.      Assessment and Plan:     The following treatment plan was discussed:    1. Difficulty concentrating  PHOSPHORUS    CBC WITH DIFFERENTIAL    do lab and f/u for 30 min visit.  will do MMSE.  try and bring daughter to appt.   2. Low phosphate levels  PHOSPHORUS    CBC WITH DIFFERENTIAL   3. Leukopenia, unspecified type  PHOSPHORUS    CBC WITH DIFFERENTIAL         Followup: Return in about 4  weeks (around 11/15/2017) for Long.

## 2017-10-20 ENCOUNTER — OFFICE VISIT (OUTPATIENT)
Dept: CARDIOLOGY | Facility: MEDICAL CENTER | Age: 82
End: 2017-10-20
Payer: MEDICARE

## 2017-10-20 VITALS
OXYGEN SATURATION: 95 % | HEART RATE: 64 BPM | DIASTOLIC BLOOD PRESSURE: 90 MMHG | SYSTOLIC BLOOD PRESSURE: 132 MMHG | WEIGHT: 125 LBS | HEIGHT: 61 IN | BODY MASS INDEX: 23.6 KG/M2

## 2017-10-20 DIAGNOSIS — E78.5 DYSLIPIDEMIA: ICD-10-CM

## 2017-10-20 DIAGNOSIS — I48.91 ATRIAL FIBRILLATION WITH RVR (HCC): ICD-10-CM

## 2017-10-20 PROCEDURE — 99214 OFFICE O/P EST MOD 30 MIN: CPT | Performed by: INTERNAL MEDICINE

## 2017-10-20 ASSESSMENT — ENCOUNTER SYMPTOMS
DEPRESSION: 1
DIZZINESS: 0
COUGH: 0
BRUISES/BLEEDS EASILY: 0
ABDOMINAL PAIN: 0
FEVER: 0
SHORTNESS OF BREATH: 0
CLAUDICATION: 0
LOSS OF CONSCIOUSNESS: 0
PALPITATIONS: 0
MYALGIAS: 0
PND: 0
ORTHOPNEA: 0
NERVOUS/ANXIOUS: 0
BLURRED VISION: 0
SPEECH CHANGE: 0

## 2017-10-20 NOTE — LETTER
[facility]   [address]   [address]  [place] [address]  Phone: [phone]  Fax: [phone]              Fabien Linares  5/27/1929    Encounter Date: 10/20/2017    Ines Maguire M.D.          PROGRESS NOTE:  Subjective:   Fabien Linares is a 87 y.o. female with known   1. H/o breast cancer, status post mastectomy  2. Bell's palsy on the left side  3. Bilateral cataracts with status post surgery  4. Paroxysmal atrial fibrillation currently on Coumadin   Presenting today for follow-up evaluation of atrial fibrillation.    Patient denied any breakthrough episodes of atrial fibrillation. Currently on anticoagulation with Coumadin no bleeding issues while on Coumadin. Patient daianaoughter is here today requesting me to fill DNR/DNI paperwork. Patient is alert and oriented ×3 clearly stated she doesn't want any aggressive measures. No complaints of chest pain pressure or tightness. Currently depressed since she lost her  but no suicidal ideations.    Past Medical History:   Diagnosis Date   • Atrial fibrillation with RVR (CMS-HCC)    • Bell's palsy    • Colon polyps    • Diarrhea of infectious origin    • GERD (gastroesophageal reflux disease)    • Osteoporosis, unspecified      Past Surgical History:   Procedure Laterality Date   • APPENDECTOMY     • INGUINAL HERNIA REPAIR      Hernia Repair, Inguinal   • LUMPECTOMY     • OTHER ORTHOPEDIC SURGERY      foot surgery bilat   • VAGINAL HYSTERECTOMY TOTAL      Hysterectomy,Total Vaginal     Family History   Problem Relation Age of Onset   • Cancer Sister      History   Smoking Status   • Never Smoker   Smokeless Tobacco   • Never Used     Allergies   Allergen Reactions   • Actonel [Risedronate Sodium]      Leg pain   • Amoxicillin    • Morphine Sulfate      Patient became A Fib   • Naproxen      unknown rxn; doctor said not to take   • Vicodin [Hydrocodone-Acetaminophen]      Unknown  "rxn; doctor said not to take     Outpatient Encounter Prescriptions as of 10/20/2017   Medication Sig Dispense Refill   • atorvastatin (LIPITOR) 40 MG Tab Take 1 Tab by mouth every bedtime. Needs to be seen for further refills. Thank you 90 Tab 3   • hydrocortisone 1 % Cream Apply to the affected area twice daily as needed 1 Tube 0   • warfarin (COUMADIN) 5 MG Tab Take 1-1.5 tabs by mouth daily or as directed by coumadin clinic 45 Tab 2   • metoprolol SR (TOPROL XL) 25 MG TABLET SR 24 HR Take 0.5 Tabs by mouth every bedtime. 45 Tab 3     No facility-administered encounter medications on file as of 10/20/2017.      Review of Systems   Constitutional: Negative for fever.   HENT: Negative for congestion.    Eyes: Negative for blurred vision.   Respiratory: Negative for cough and shortness of breath.    Cardiovascular: Negative for chest pain, palpitations, orthopnea, claudication, leg swelling and PND.   Gastrointestinal: Negative for abdominal pain.   Musculoskeletal: Positive for joint pain. Negative for myalgias.   Skin: Negative for rash.   Neurological: Negative for dizziness, speech change and loss of consciousness.   Endo/Heme/Allergies: Does not bruise/bleed easily.   Psychiatric/Behavioral: Positive for depression. The patient is not nervous/anxious.    All other systems reviewed and are negative.       Objective:   /90   Pulse 64   Ht 1.549 m (5' 1\")   Wt 56.7 kg (125 lb)   SpO2 95%   BMI 23.62 kg/m²      Physical Exam   Constitutional: She is oriented to person, place, and time. She appears well-developed. No distress.   HENT:   Mouth/Throat: Mucous membranes are normal.   Eyes: Conjunctivae and EOM are normal.   Neck: No JVD present. No tracheal deviation present. No thyroid mass present.   Cardiovascular: Normal rate, regular rhythm and intact distal pulses.    Murmur heard.   Systolic murmur is present with a grade of 2/6   Pulses:       Carotid pulses are 2+ on the right side, and 2+ on the " left side.       Radial pulses are 2+ on the right side, and 2+ on the left side.        Dorsalis pedis pulses are 2+ on the right side, and 2+ on the left side.        Posterior tibial pulses are 2+ on the right side, and 2+ on the left side.   Soft ejection systolic murmur best heard in the aortic area   Pulmonary/Chest: Effort normal and breath sounds normal. No respiratory distress. She exhibits no tenderness.   Abdominal: Soft. There is no tenderness.   Musculoskeletal: Normal range of motion. She exhibits no edema.   Neurological: She is alert and oriented to person, place, and time. She has normal strength. She displays no tremor.   Skin: Skin is warm and dry. She is not diaphoretic.   Psychiatric: She has a normal mood and affect. Her behavior is normal.   Vitals reviewed.   Results for AMBER MEDINA (MRN [id number]) as of 10/20/2017 10:51   8/15/2016  2017    Sodium 143 140   Potassium 4.5 3.7   Chloride 109 107   Co2 27 26   Anion Gap 7.0 7.0   Glucose 103 (H) 83   Bun 16 13   Creatinine 0.82 0.67   GFR If Non African American >60 >60   Calcium 9.7 9.1   AST(SGOT) 18 33   ALT(SGPT) 12 30   Alkaline Phosphatase 58 72   Glycohemoglobin 5.6    Estim. Avg Glu 114    Cholesterol,Tot 294 (H) 137   Triglycerides 163 (H) 81   HDL 66 48    (H) 73     EK/15/16  EKG personally reviewed by me  Sinus rhythm 71 bpm, late R/S transition, LVH    Transthoracic echo: 14   Normal left ventricular chamber size. Moderate left ventricular hypertrophy without evidence of outflow tract obstruction. Normal regional wall motion. Normal left ventricular systolic function. Left   ventricular ejection fraction is 70% to 75%. Diastolic funciton difficult to assess due to atrial fib.  Mildly dilated left atrium.  Trace mitral regurgitation.  Mild aortic sclerosis without stenosis.  Mild tricuspid regurgitation. Right ventricular systolic pressure is estimated to be 23 mmHg.  Normal pericardium without  effusion.  The aortic root is normal.    Transthoracic echo: 5/5/10   Normal left ventricular chamber size. Normal left ventricular systolic function. Grade I diastolic dysfunction is present. Left ventricular ejection fraction is greater than 75%. Global hypokinesis. Mild   concentric left ventricular hypertrophy.   Trace mitral regurgitation.  The aortic valve is structurally and functionally normal.  Right ventricular systolic pressure is estimated to be 42 mmHg.  Normal pericardium without effusion.   The aortic root is normal.     Assessment:   1. Atrial fibrillation-paroxysmal  2. Chronic anticoagulation   3. Dyslipidemia  4. Advanced directives  Medical Decision Making:  Today's Assessment / Status / Plan:     1. Patient currently in sinus rhythm.  Continue Toprol-XL 25 mg daily   2. YHK4BI8-JCSb Score is 3 which corresponds to a high risk of stroke in atrial fibrillation. Yearly risk of stroke without anticoagulation is estimated at 5.9%.   Continue Coumadin with target INR of 2-3.  3. Continue Lipitor 40 mg qHs.  LDL less than 100.  Start CoQ10 100 mg daily.  4. DO NOT RESUSCITATE DO NOT INTUBATE paperwork done along with POLST (provider orders for life-sustaining treatment).    Patient is DNR/DNI    Follow-up in one year.  Labs prior to next visit.    Thank you for allowing me to participate in taking care of patient.    Ines Maguire. MD.        Kerry Lugo, A.P.N.  [address]  [address]  [place] [id number]  [address] In [address]

## 2017-10-20 NOTE — PROGRESS NOTES
Subjective:   Fabien Linares is a 87 y.o. female with known   1. H/o breast cancer, status post mastectomy  2. Bell's palsy on the left side  3. Bilateral cataracts with status post surgery  4. Paroxysmal atrial fibrillation currently on Coumadin   Presenting today for follow-up evaluation of atrial fibrillation.    Patient denied any breakthrough episodes of atrial fibrillation. Currently on anticoagulation with Coumadin no bleeding issues while on Coumadin. Patient flaco is here today requesting me to fill DNR/DNI paperwork. Patient is alert and oriented ×3 clearly stated she doesn't want any aggressive measures. No complaints of chest pain pressure or tightness. Currently depressed since she lost her  but no suicidal ideations.    Past Medical History:   Diagnosis Date   • Atrial fibrillation with RVR (CMS-HCC)    • Bell's palsy    • Colon polyps    • Diarrhea of infectious origin    • GERD (gastroesophageal reflux disease)    • Osteoporosis, unspecified      Past Surgical History:   Procedure Laterality Date   • APPENDECTOMY     • INGUINAL HERNIA REPAIR      Hernia Repair, Inguinal   • LUMPECTOMY     • OTHER ORTHOPEDIC SURGERY      foot surgery bilat   • VAGINAL HYSTERECTOMY TOTAL      Hysterectomy,Total Vaginal     Family History   Problem Relation Age of Onset   • Cancer Sister      History   Smoking Status   • Never Smoker   Smokeless Tobacco   • Never Used     Allergies   Allergen Reactions   • Actonel [Risedronate Sodium]      Leg pain   • Amoxicillin    • Morphine Sulfate      Patient became A Fib   • Naproxen      unknown rxn; doctor said not to take   • Vicodin [Hydrocodone-Acetaminophen]      Unknown rxn; doctor said not to take     Outpatient Encounter Prescriptions as of 10/20/2017   Medication Sig Dispense Refill   • atorvastatin (LIPITOR) 40 MG Tab Take 1 Tab by mouth every bedtime. Needs to be seen for further refills. Thank you 90 Tab 3   • hydrocortisone 1 % Cream Apply to the  "affected area twice daily as needed 1 Tube 0   • warfarin (COUMADIN) 5 MG Tab Take 1-1.5 tabs by mouth daily or as directed by coumadin clinic 45 Tab 2   • metoprolol SR (TOPROL XL) 25 MG TABLET SR 24 HR Take 0.5 Tabs by mouth every bedtime. 45 Tab 3     No facility-administered encounter medications on file as of 10/20/2017.      Review of Systems   Constitutional: Negative for fever.   HENT: Negative for congestion.    Eyes: Negative for blurred vision.   Respiratory: Negative for cough and shortness of breath.    Cardiovascular: Negative for chest pain, palpitations, orthopnea, claudication, leg swelling and PND.   Gastrointestinal: Negative for abdominal pain.   Musculoskeletal: Positive for joint pain. Negative for myalgias.   Skin: Negative for rash.   Neurological: Negative for dizziness, speech change and loss of consciousness.   Endo/Heme/Allergies: Does not bruise/bleed easily.   Psychiatric/Behavioral: Positive for depression. The patient is not nervous/anxious.    All other systems reviewed and are negative.       Objective:   /90   Pulse 64   Ht 1.549 m (5' 1\")   Wt 56.7 kg (125 lb)   SpO2 95%   BMI 23.62 kg/m²     Physical Exam   Constitutional: She is oriented to person, place, and time. She appears well-developed. No distress.   HENT:   Mouth/Throat: Mucous membranes are normal.   Eyes: Conjunctivae and EOM are normal.   Neck: No JVD present. No tracheal deviation present. No thyroid mass present.   Cardiovascular: Normal rate, regular rhythm and intact distal pulses.    Murmur heard.   Systolic murmur is present with a grade of 2/6   Pulses:       Carotid pulses are 2+ on the right side, and 2+ on the left side.       Radial pulses are 2+ on the right side, and 2+ on the left side.        Dorsalis pedis pulses are 2+ on the right side, and 2+ on the left side.        Posterior tibial pulses are 2+ on the right side, and 2+ on the left side.   Soft ejection systolic murmur best heard in " the aortic area   Pulmonary/Chest: Effort normal and breath sounds normal. No respiratory distress. She exhibits no tenderness.   Abdominal: Soft. There is no tenderness.   Musculoskeletal: Normal range of motion. She exhibits no edema.   Neurological: She is alert and oriented to person, place, and time. She has normal strength. She displays no tremor.   Skin: Skin is warm and dry. She is not diaphoretic.   Psychiatric: She has a normal mood and affect. Her behavior is normal.   Vitals reviewed.   Results for AMBER MEDINA (MRN [id number]) as of 10/20/2017 10:51   8/15/2016  2017    Sodium 143 140   Potassium 4.5 3.7   Chloride 109 107   Co2 27 26   Anion Gap 7.0 7.0   Glucose 103 (H) 83   Bun 16 13   Creatinine 0.82 0.67   GFR If Non African American >60 >60   Calcium 9.7 9.1   AST(SGOT) 18 33   ALT(SGPT) 12 30   Alkaline Phosphatase 58 72   Glycohemoglobin 5.6    Estim. Avg Glu 114    Cholesterol,Tot 294 (H) 137   Triglycerides 163 (H) 81   HDL 66 48    (H) 73     EK/15/16  EKG personally reviewed by me  Sinus rhythm 71 bpm, late R/S transition, LVH    Transthoracic echo: 14   Normal left ventricular chamber size. Moderate left ventricular hypertrophy without evidence of outflow tract obstruction. Normal regional wall motion. Normal left ventricular systolic function. Left   ventricular ejection fraction is 70% to 75%. Diastolic funciton difficult to assess due to atrial fib.  Mildly dilated left atrium.  Trace mitral regurgitation.  Mild aortic sclerosis without stenosis.  Mild tricuspid regurgitation. Right ventricular systolic pressure is estimated to be 23 mmHg.  Normal pericardium without effusion.  The aortic root is normal.    Transthoracic echo: 5/5/10   Normal left ventricular chamber size. Normal left ventricular systolic function. Grade I diastolic dysfunction is present. Left ventricular ejection fraction is greater than 75%. Global hypokinesis. Mild   concentric left  ventricular hypertrophy.   Trace mitral regurgitation.  The aortic valve is structurally and functionally normal.  Right ventricular systolic pressure is estimated to be 42 mmHg.  Normal pericardium without effusion.   The aortic root is normal.     Assessment:   1. Atrial fibrillation-paroxysmal  2. Chronic anticoagulation   3. Dyslipidemia  4. Advanced directives  Medical Decision Making:  Today's Assessment / Status / Plan:     1. Patient currently in sinus rhythm.  Continue Toprol-XL 25 mg daily   2. [id number] Score is 3 which corresponds to a high risk of stroke in atrial fibrillation. Yearly risk of stroke without anticoagulation is estimated at 5.9%.   Continue Coumadin with target INR of 2-3.  3. Continue Lipitor 40 mg qHs.  LDL less than 100.  Start CoQ10 100 mg daily.  4. DO NOT RESUSCITATE DO NOT INTUBATE paperwork done along with POLST (provider orders for life-sustaining treatment).    Patient is DNR/DNI    Follow-up in one year.  Labs prior to next visit.    Thank you for allowing me to participate in taking care of patient.    Ines Lane MD.

## 2017-10-25 ENCOUNTER — APPOINTMENT (OUTPATIENT)
Dept: VASCULAR LAB | Facility: MEDICAL CENTER | Age: 82
End: 2017-10-25
Attending: INTERNAL MEDICINE
Payer: MEDICARE

## 2017-11-10 ENCOUNTER — HOSPITAL ENCOUNTER (OUTPATIENT)
Dept: LAB | Facility: MEDICAL CENTER | Age: 82
End: 2017-11-10
Attending: NURSE PRACTITIONER
Payer: MEDICARE

## 2017-11-10 ENCOUNTER — HOSPITAL ENCOUNTER (OUTPATIENT)
Dept: LAB | Facility: MEDICAL CENTER | Age: 82
End: 2017-11-10
Attending: INTERNAL MEDICINE
Payer: MEDICARE

## 2017-11-10 DIAGNOSIS — D72.819 LEUKOPENIA, UNSPECIFIED TYPE: ICD-10-CM

## 2017-11-10 DIAGNOSIS — E78.5 DYSLIPIDEMIA: ICD-10-CM

## 2017-11-10 DIAGNOSIS — R41.840 DIFFICULTY CONCENTRATING: ICD-10-CM

## 2017-11-10 DIAGNOSIS — I48.91 ATRIAL FIBRILLATION WITH RVR (HCC): ICD-10-CM

## 2017-11-10 DIAGNOSIS — E83.39 LOW PHOSPHATE LEVELS: ICD-10-CM

## 2017-11-10 RX ORDER — METOPROLOL SUCCINATE 25 MG/1
12.5 TABLET, EXTENDED RELEASE ORAL
Qty: 45 TAB | Refills: 3 | Status: SHIPPED | OUTPATIENT
Start: 2017-11-10 | End: 2018-08-13 | Stop reason: SDUPTHER

## 2017-11-15 ENCOUNTER — HOSPITAL ENCOUNTER (OUTPATIENT)
Dept: LAB | Facility: MEDICAL CENTER | Age: 82
End: 2017-11-15
Attending: NURSE PRACTITIONER
Payer: MEDICARE

## 2017-11-15 ENCOUNTER — HOSPITAL ENCOUNTER (OUTPATIENT)
Dept: LAB | Facility: MEDICAL CENTER | Age: 82
End: 2017-11-15
Attending: INTERNAL MEDICINE
Payer: MEDICARE

## 2017-11-15 ENCOUNTER — TELEPHONE (OUTPATIENT)
Dept: MEDICAL GROUP | Facility: MEDICAL CENTER | Age: 82
End: 2017-11-15

## 2017-11-15 LAB
ALBUMIN SERPL BCP-MCNC: 3.8 G/DL (ref 3.2–4.9)
ALBUMIN/GLOB SERPL: 1.8 G/DL
ALP SERPL-CCNC: 55 U/L (ref 30–99)
ALT SERPL-CCNC: 20 U/L (ref 2–50)
ANION GAP SERPL CALC-SCNC: 6 MMOL/L (ref 0–11.9)
AST SERPL-CCNC: 19 U/L (ref 12–45)
BILIRUB SERPL-MCNC: 0.7 MG/DL (ref 0.1–1.5)
BUN SERPL-MCNC: 24 MG/DL (ref 8–22)
CALCIUM SERPL-MCNC: 9.5 MG/DL (ref 8.5–10.5)
CHLORIDE SERPL-SCNC: 110 MMOL/L (ref 96–112)
CHOLEST SERPL-MCNC: 168 MG/DL (ref 100–199)
CO2 SERPL-SCNC: 26 MMOL/L (ref 20–33)
CREAT SERPL-MCNC: 0.8 MG/DL (ref 0.5–1.4)
GFR SERPL CREATININE-BSD FRML MDRD: >60 ML/MIN/1.73 M 2
GLOBULIN SER CALC-MCNC: 2.1 G/DL (ref 1.9–3.5)
GLUCOSE SERPL-MCNC: 102 MG/DL (ref 65–99)
HDLC SERPL-MCNC: 70 MG/DL
LDLC SERPL CALC-MCNC: 83 MG/DL
PHOSPHATE SERPL-MCNC: 3 MG/DL (ref 2.5–4.5)
POTASSIUM SERPL-SCNC: 4.2 MMOL/L (ref 3.6–5.5)
PROT SERPL-MCNC: 5.9 G/DL (ref 6–8.2)
SODIUM SERPL-SCNC: 142 MMOL/L (ref 135–145)
TRIGL SERPL-MCNC: 74 MG/DL (ref 0–149)

## 2017-11-15 PROCEDURE — 84100 ASSAY OF PHOSPHORUS: CPT

## 2017-11-15 PROCEDURE — 36415 COLL VENOUS BLD VENIPUNCTURE: CPT

## 2017-11-15 PROCEDURE — 80061 LIPID PANEL: CPT

## 2017-11-15 PROCEDURE — 80053 COMPREHEN METABOLIC PANEL: CPT

## 2017-11-16 ENCOUNTER — TELEPHONE (OUTPATIENT)
Dept: MEDICAL GROUP | Facility: MEDICAL CENTER | Age: 82
End: 2017-11-16

## 2017-11-16 NOTE — TELEPHONE ENCOUNTER
Please let pt know that the lab is wnl except her glucose is very mildly elevated and total protein very mildly dec.  Needs to eat more protein in diet and less carbs.

## 2017-11-16 NOTE — TELEPHONE ENCOUNTER
Pt notified   Pt's daughter Christiana states it is very hard to get her into the office. Can she just get the results of the blood work over the phone

## 2017-11-16 NOTE — TELEPHONE ENCOUNTER
----- Message from HARVINDER Browne sent at 11/15/2017  5:54 PM PST -----  Please have pt set appointment to review and discuss treatment for labs.

## 2017-11-17 NOTE — PROGRESS NOTES
Results for CAROL AMBER TREVIÑO (MRN [id number]) as of 11/17/2017 12:44   11/15/2017   Sodium 142   Potassium 4.2   Chloride 110   Co2 26   Anion Gap 6.0   Glucose 102 (H)   Bun 24 (H)   Creatinine 0.80   GFR If Non African American >60   Calcium 9.5   AST(SGOT) 19   ALT(SGPT) 20   Alkaline Phosphatase 55   Cholesterol,Tot 168   Triglycerides 74   HDL 70   LDL 83

## 2018-02-05 ENCOUNTER — OFFICE VISIT (OUTPATIENT)
Dept: MEDICAL GROUP | Facility: MEDICAL CENTER | Age: 83
End: 2018-02-05
Payer: MEDICARE

## 2018-02-05 VITALS
DIASTOLIC BLOOD PRESSURE: 80 MMHG | HEIGHT: 61 IN | SYSTOLIC BLOOD PRESSURE: 140 MMHG | WEIGHT: 134 LBS | HEART RATE: 78 BPM | TEMPERATURE: 97.4 F | OXYGEN SATURATION: 93 % | BODY MASS INDEX: 25.3 KG/M2

## 2018-02-05 DIAGNOSIS — L98.9 SCALP LESION: ICD-10-CM

## 2018-02-05 DIAGNOSIS — R41.3 MEMORY LOSS, SHORT TERM: ICD-10-CM

## 2018-02-05 DIAGNOSIS — M25.561 CHRONIC PAIN OF RIGHT KNEE: ICD-10-CM

## 2018-02-05 DIAGNOSIS — E78.5 HYPERLIPIDEMIA LDL GOAL <100: ICD-10-CM

## 2018-02-05 DIAGNOSIS — G89.29 CHRONIC PAIN OF RIGHT KNEE: ICD-10-CM

## 2018-02-05 PROCEDURE — 99214 OFFICE O/P EST MOD 30 MIN: CPT | Performed by: NURSE PRACTITIONER

## 2018-02-05 NOTE — PROGRESS NOTES
Subjective:     Fabien Linares is a 88 y.o. female who presents with   Chief Complaint   Patient presents with   • Memory Loss   .    HPI:   Seen in f/u for memory loss.  Pt is living alone.  Seen with daughter today.  She is her POA.  Daughter is caregive.  Daughter does bills.  Pt lives alone,  Still drives.  Cleans her own house and gardens.  Daughter believes that pt is safe in home.   Dr Maguire did the POLST.    Reviewed lab wiht pt and daughter.  Her Po4 GFR, LP is wnl  CMP sh ows mildly dec protein at 5.9 and mildly elevated glucose at 102.   She is having rt knee pain.  She would like to see someone to give her a injection.    She has a cyst on top of head.  Been therel delma term.  Would like removed.     Patient Active Problem List    Diagnosis Date Noted   • Dyslipidemia 10/20/2017   • Diarrhea of presumed infectious origin 06/14/2017   • Preventative health care 03/05/2014   • GERD (gastroesophageal reflux disease)    • Bell's palsy    • Osteoporosis    • Colon polyps    • Atrial fibrillation with RVR (CMS-[facility]) 01/08/2014       Current medicines (including changes today)  Current Outpatient Prescriptions   Medication Sig Dispense Refill   • metoprolol SR (TOPROL XL) 25 MG TABLET SR 24 HR Take 0.5 Tabs by mouth every bedtime. 45 Tab 3   • atorvastatin (LIPITOR) 40 MG Tab Take 1 Tab by mouth every bedtime. Needs to be seen for further refills. Thank you 90 Tab 3   • warfarin (COUMADIN) 5 MG Tab Take 1-1.5 tabs by mouth daily or as directed by coumadin clinic 45 Tab 2     No current facility-administered medications for this visit.        Allergies   Allergen Reactions   • Actonel [Risedronate Sodium]      Leg pain   • Amoxicillin    • Morphine Sulfate      Patient became A Fib   • Naproxen      unknown rxn; doctor said not to take   • Vicodin [Hydrocodone-Acetaminophen]      Unknown rxn; doctor said not to take       ROS  Constitutional: Negative. Negative for fever, chills, weight loss,  "malaise/fatigue and diaphoresis.   HENT: Negative. Negative for hearing loss, ear pain, nosebleeds, congestion, sore throat, neck pain, tinnitus and ear discharge.   Respiratory: Negative. Negative for cough, hemoptysis, sputum production, shortness of breath, wheezing and stridor.   Cardiovascular: Negative. Negative for chest pain, palpitations, orthopnea, claudication, leg swelling and PND.   Gastrointestinal: Denies nausea, vomiting, diarrhea, constipation, heartburn, melena or hematochezia.  Genitourinary: Denies dysuria, hematuria, urinary incontinence, frequency or urgency.        Objective:     Blood pressure 140/80, pulse 78, temperature 36.3 °C (97.4 °F), height 1.549 m (5' 1\"), weight 60.8 kg (134 lb), SpO2 93 %, not currently breastfeeding. Body mass index is 25.32 kg/m².    Physical Exam:  Vitals reviewed.  Constitutional: Oriented to person, place, and time. appears well-developed and well-nourished. No distress.  No sinus pressure to palp  Cardiovascular: Normal rate, regular rhythm, normal heart sounds and intact distal pulses. Exam reveals no gallop and no friction rub. 3/6 murmur heard. No carotid bruits.   Pulmonary/Chest: Effort normal and breath sounds normal. No stridor. No respiratory distress. no wheezes or rales. exhibits no tenderness.   Musculoskeletal: Normal range of motion. exhibits no edema. bridgette pedal pulses 2+. Negative drawer, susan and lachmans test on rt knee.   Lymphadenopathy: No cervical or supraclavicular adenopathy.   Neurological: Alert and oriented to person, place, and time. exhibits normal muscle tone.  Skin: Skin is warm and dry. No diaphoresis.   Psychiatric: Normal mood and affect. Behavior is normal.  MMSE today is 26/30      Assessment and Plan:     The following treatment plan was discussed:    1. Memory loss, short term      MMSE TODAY 26/30.  will continue to monitor.  she has family support with daughter.   2. Chronic pain of right knee  REFERRAL TO ORTHOPEDICS "    DX-KNEE COMPLETE 4+ RIGHT    known to normal young.  will xray knee d/t inc pain.  refer back to him   3. Hyperlipidemia LDL goal <100      stable on meds   4. Scalp lesion  REFERRAL TO DERMATOLOGY    refer derm for removal         Followup: Return in about 3 months (around 5/5/2018).

## 2018-02-28 DIAGNOSIS — I48.91 ATRIAL FIBRILLATION, UNSPECIFIED TYPE (HCC): ICD-10-CM

## 2018-03-07 DIAGNOSIS — I48.91 ATRIAL FIBRILLATION WITH RVR (HCC): ICD-10-CM

## 2018-03-07 RX ORDER — WARFARIN SODIUM 5 MG/1
5-7.5 TABLET ORAL DAILY
Qty: 30 TAB | Refills: 0 | Status: SHIPPED | OUTPATIENT
Start: 2018-03-07 | End: 2018-03-08

## 2018-03-08 ENCOUNTER — TELEPHONE (OUTPATIENT)
Dept: MEDICAL GROUP | Facility: MEDICAL CENTER | Age: 83
End: 2018-03-08

## 2018-03-08 ENCOUNTER — OFFICE VISIT (OUTPATIENT)
Dept: MEDICAL GROUP | Facility: MEDICAL CENTER | Age: 83
End: 2018-03-08
Payer: MEDICARE

## 2018-03-08 ENCOUNTER — HOSPITAL ENCOUNTER (INPATIENT)
Facility: MEDICAL CENTER | Age: 83
LOS: 2 days | DRG: 813 | End: 2018-03-10
Attending: EMERGENCY MEDICINE | Admitting: FAMILY MEDICINE
Payer: MEDICARE

## 2018-03-08 ENCOUNTER — HOSPITAL ENCOUNTER (OUTPATIENT)
Dept: LAB | Facility: MEDICAL CENTER | Age: 83
DRG: 813 | End: 2018-03-08
Attending: NURSE PRACTITIONER
Payer: MEDICARE

## 2018-03-08 ENCOUNTER — RESOLUTE PROFESSIONAL BILLING HOSPITAL PROF FEE (OUTPATIENT)
Dept: HOSPITALIST | Facility: MEDICAL CENTER | Age: 83
End: 2018-03-08
Payer: MEDICARE

## 2018-03-08 VITALS
DIASTOLIC BLOOD PRESSURE: 88 MMHG | OXYGEN SATURATION: 92 % | SYSTOLIC BLOOD PRESSURE: 122 MMHG | HEIGHT: 61 IN | HEART RATE: 70 BPM | TEMPERATURE: 97.8 F | BODY MASS INDEX: 25.3 KG/M2 | WEIGHT: 134 LBS

## 2018-03-08 DIAGNOSIS — K62.5 RECTAL BLEED: ICD-10-CM

## 2018-03-08 DIAGNOSIS — T45.511A: ICD-10-CM

## 2018-03-08 DIAGNOSIS — I48.91 ATRIAL FIBRILLATION WITH RVR (HCC): ICD-10-CM

## 2018-03-08 DIAGNOSIS — S20.01XA TRAUMATIC ECCHYMOSIS OF RIGHT FEMALE BREAST, INITIAL ENCOUNTER: ICD-10-CM

## 2018-03-08 DIAGNOSIS — Z85.3 HISTORY OF LEFT BREAST CANCER: ICD-10-CM

## 2018-03-08 DIAGNOSIS — R79.1 SUPRATHERAPEUTIC INR: ICD-10-CM

## 2018-03-08 DIAGNOSIS — I48.0 PAROXYSMAL ATRIAL FIBRILLATION (HCC): ICD-10-CM

## 2018-03-08 DIAGNOSIS — K62.5 BRBPR (BRIGHT RED BLOOD PER RECTUM): ICD-10-CM

## 2018-03-08 DIAGNOSIS — R19.5 OCCULT BLOOD IN STOOLS: ICD-10-CM

## 2018-03-08 DIAGNOSIS — Z79.01 CHRONIC ANTICOAGULATION: ICD-10-CM

## 2018-03-08 DIAGNOSIS — N63.0 BREAST NODULE: ICD-10-CM

## 2018-03-08 DIAGNOSIS — K64.8 INTERNAL HEMORRHOID: ICD-10-CM

## 2018-03-08 PROBLEM — R41.3 MEMORY IMPAIRMENT: Status: ACTIVE | Noted: 2018-03-08

## 2018-03-08 PROBLEM — D68.32 HEMORRHAGIC DISORDER DUE TO EXTRINSIC CIRCULATING ANTICOAGULANTS (HCC): Status: ACTIVE | Noted: 2018-03-08

## 2018-03-08 PROBLEM — K92.2 GIB (GASTROINTESTINAL BLEEDING): Status: ACTIVE | Noted: 2018-03-08

## 2018-03-08 PROBLEM — I10 HTN (HYPERTENSION): Status: ACTIVE | Noted: 2018-03-08

## 2018-03-08 LAB
ABO GROUP BLD: NORMAL
ABO GROUP BLD: NORMAL
ALBUMIN SERPL BCP-MCNC: 4 G/DL (ref 3.2–4.9)
ALBUMIN SERPL BCP-MCNC: 4 G/DL (ref 3.2–4.9)
ALBUMIN/GLOB SERPL: 1.7 G/DL
ALBUMIN/GLOB SERPL: 1.8 G/DL
ALP SERPL-CCNC: 78 U/L (ref 30–99)
ALP SERPL-CCNC: 79 U/L (ref 30–99)
ALT SERPL-CCNC: 19 U/L (ref 2–50)
ALT SERPL-CCNC: 21 U/L (ref 2–50)
ANION GAP SERPL CALC-SCNC: 6 MMOL/L (ref 0–11.9)
ANION GAP SERPL CALC-SCNC: 7 MMOL/L (ref 0–11.9)
APTT PPP: 64.3 SEC (ref 24.7–36)
AST SERPL-CCNC: 24 U/L (ref 12–45)
AST SERPL-CCNC: 28 U/L (ref 12–45)
BARCODED ABORH UBTYP: 9500
BARCODED PRD CODE UBPRD: NORMAL
BARCODED UNIT NUM UBUNT: NORMAL
BASOPHILS # BLD AUTO: 0.5 % (ref 0–1.8)
BASOPHILS # BLD AUTO: 0.6 % (ref 0–1.8)
BASOPHILS # BLD: 0.03 K/UL (ref 0–0.12)
BASOPHILS # BLD: 0.05 K/UL (ref 0–0.12)
BILIRUB SERPL-MCNC: 0.9 MG/DL (ref 0.1–1.5)
BILIRUB SERPL-MCNC: 1.3 MG/DL (ref 0.1–1.5)
BLD GP AB SCN SERPL QL: NORMAL
BUN SERPL-MCNC: 21 MG/DL (ref 8–22)
BUN SERPL-MCNC: 25 MG/DL (ref 8–22)
CALCIUM SERPL-MCNC: 9.3 MG/DL (ref 8.4–10.2)
CALCIUM SERPL-MCNC: 9.6 MG/DL (ref 8.5–10.5)
CHLORIDE SERPL-SCNC: 110 MMOL/L (ref 96–112)
CHLORIDE SERPL-SCNC: 113 MMOL/L (ref 96–112)
CO2 SERPL-SCNC: 20 MMOL/L (ref 20–33)
CO2 SERPL-SCNC: 27 MMOL/L (ref 20–33)
COMPONENT FT 8504FT: NORMAL
CREAT SERPL-MCNC: 0.74 MG/DL (ref 0.5–1.4)
CREAT SERPL-MCNC: 0.84 MG/DL (ref 0.5–1.4)
EOSINOPHIL # BLD AUTO: 0.08 K/UL (ref 0–0.51)
EOSINOPHIL # BLD AUTO: 0.15 K/UL (ref 0–0.51)
EOSINOPHIL NFR BLD: 1.4 % (ref 0–6.9)
EOSINOPHIL NFR BLD: 1.9 % (ref 0–6.9)
ERYTHROCYTE [DISTWIDTH] IN BLOOD BY AUTOMATED COUNT: 45.1 FL (ref 35.9–50)
ERYTHROCYTE [DISTWIDTH] IN BLOOD BY AUTOMATED COUNT: 46.8 FL (ref 35.9–50)
GLOBULIN SER CALC-MCNC: 2.2 G/DL (ref 1.9–3.5)
GLOBULIN SER CALC-MCNC: 2.4 G/DL (ref 1.9–3.5)
GLUCOSE SERPL-MCNC: 86 MG/DL (ref 65–99)
GLUCOSE SERPL-MCNC: 97 MG/DL (ref 65–99)
HCT VFR BLD AUTO: 40.9 % (ref 37–47)
HCT VFR BLD AUTO: 43.5 % (ref 37–47)
HGB BLD-MCNC: 11.9 G/DL (ref 12–16)
HGB BLD-MCNC: 12.8 G/DL (ref 12–16)
HGB BLD-MCNC: 14.1 G/DL (ref 12–16)
HGB BLD-MCNC: 14.5 G/DL (ref 12–16)
IMM GRANULOCYTES # BLD AUTO: 0.03 K/UL (ref 0–0.11)
IMM GRANULOCYTES # BLD AUTO: 0.04 K/UL (ref 0–0.11)
IMM GRANULOCYTES NFR BLD AUTO: 0.5 % (ref 0–0.9)
IMM GRANULOCYTES NFR BLD AUTO: 0.5 % (ref 0–0.9)
INR PPP: >=10 (ref 0.87–1.13)
INR PPP: >=10 (ref 0.87–1.13)
LYMPHOCYTES # BLD AUTO: 1.18 K/UL (ref 1–4.8)
LYMPHOCYTES # BLD AUTO: 1.2 K/UL (ref 1–4.8)
LYMPHOCYTES NFR BLD: 15.1 % (ref 22–41)
LYMPHOCYTES NFR BLD: 20.6 % (ref 22–41)
MCH RBC QN AUTO: 33.6 PG (ref 27–33)
MCH RBC QN AUTO: 33.7 PG (ref 27–33)
MCHC RBC AUTO-ENTMCNC: 33.3 G/DL (ref 33.6–35)
MCHC RBC AUTO-ENTMCNC: 34.5 G/DL (ref 33.6–35)
MCV RBC AUTO: 100.7 FL (ref 81.4–97.8)
MCV RBC AUTO: 97.6 FL (ref 81.4–97.8)
MONOCYTES # BLD AUTO: 0.43 K/UL (ref 0–0.85)
MONOCYTES # BLD AUTO: 0.62 K/UL (ref 0–0.85)
MONOCYTES NFR BLD AUTO: 7.4 % (ref 0–13.4)
MONOCYTES NFR BLD AUTO: 8 % (ref 0–13.4)
NEUTROPHILS # BLD AUTO: 4.05 K/UL (ref 2–7.15)
NEUTROPHILS # BLD AUTO: 5.75 K/UL (ref 2–7.15)
NEUTROPHILS NFR BLD: 69.6 % (ref 44–72)
NEUTROPHILS NFR BLD: 73.9 % (ref 44–72)
NRBC # BLD AUTO: 0 K/UL
NRBC # BLD AUTO: 0 K/UL
NRBC BLD-RTO: 0 /100 WBC
NRBC BLD-RTO: 0 /100 WBC
PLATELET # BLD AUTO: 146 K/UL (ref 164–446)
PLATELET # BLD AUTO: 151 K/UL (ref 164–446)
PMV BLD AUTO: 9.4 FL (ref 9–12.9)
PMV BLD AUTO: 9.9 FL (ref 9–12.9)
POTASSIUM SERPL-SCNC: 3.8 MMOL/L (ref 3.6–5.5)
POTASSIUM SERPL-SCNC: 4.5 MMOL/L (ref 3.6–5.5)
PRODUCT TYPE UPROD: NORMAL
PROT SERPL-MCNC: 6.2 G/DL (ref 6–8.2)
PROT SERPL-MCNC: 6.4 G/DL (ref 6–8.2)
PROTHROMBIN TIME: 91.4 SEC (ref 12–14.6)
PROTHROMBIN TIME: 97.2 SEC (ref 12–14.6)
RBC # BLD AUTO: 4.19 M/UL (ref 4.2–5.4)
RBC # BLD AUTO: 4.32 M/UL (ref 4.2–5.4)
RH BLD: NORMAL
RH BLD: NORMAL
SODIUM SERPL-SCNC: 140 MMOL/L (ref 135–145)
SODIUM SERPL-SCNC: 143 MMOL/L (ref 135–145)
TSH SERPL DL<=0.005 MIU/L-ACNC: 4.33 UIU/ML (ref 0.38–5.33)
UNIT STATUS USTAT: NORMAL
WBC # BLD AUTO: 5.8 K/UL (ref 4.8–10.8)
WBC # BLD AUTO: 7.8 K/UL (ref 4.8–10.8)

## 2018-03-08 PROCEDURE — 36430 TRANSFUSION BLD/BLD COMPNT: CPT

## 2018-03-08 PROCEDURE — 85025 COMPLETE CBC W/AUTO DIFF WBC: CPT

## 2018-03-08 PROCEDURE — 80053 COMPREHEN METABOLIC PANEL: CPT | Mod: 91

## 2018-03-08 PROCEDURE — 85025 COMPLETE CBC W/AUTO DIFF WBC: CPT | Mod: 91

## 2018-03-08 PROCEDURE — 99285 EMERGENCY DEPT VISIT HI MDM: CPT

## 2018-03-08 PROCEDURE — 85018 HEMOGLOBIN: CPT

## 2018-03-08 PROCEDURE — 85610 PROTHROMBIN TIME: CPT

## 2018-03-08 PROCEDURE — 86850 RBC ANTIBODY SCREEN: CPT

## 2018-03-08 PROCEDURE — 700105 HCHG RX REV CODE 258: Performed by: FAMILY MEDICINE

## 2018-03-08 PROCEDURE — 99223 1ST HOSP IP/OBS HIGH 75: CPT | Mod: AI | Performed by: FAMILY MEDICINE

## 2018-03-08 PROCEDURE — 86901 BLOOD TYPING SEROLOGIC RH(D): CPT

## 2018-03-08 PROCEDURE — 80053 COMPREHEN METABOLIC PANEL: CPT

## 2018-03-08 PROCEDURE — P9017 PLASMA 1 DONOR FRZ W/IN 8 HR: HCPCS

## 2018-03-08 PROCEDURE — 700105 HCHG RX REV CODE 258: Performed by: EMERGENCY MEDICINE

## 2018-03-08 PROCEDURE — 86900 BLOOD TYPING SEROLOGIC ABO: CPT

## 2018-03-08 PROCEDURE — 96365 THER/PROPH/DIAG IV INF INIT: CPT

## 2018-03-08 PROCEDURE — 99214 OFFICE O/P EST MOD 30 MIN: CPT | Performed by: NURSE PRACTITIONER

## 2018-03-08 PROCEDURE — 700111 HCHG RX REV CODE 636 W/ 250 OVERRIDE (IP): Performed by: EMERGENCY MEDICINE

## 2018-03-08 PROCEDURE — 36415 COLL VENOUS BLD VENIPUNCTURE: CPT

## 2018-03-08 PROCEDURE — 84443 ASSAY THYROID STIM HORMONE: CPT

## 2018-03-08 PROCEDURE — 94760 N-INVAS EAR/PLS OXIMETRY 1: CPT

## 2018-03-08 PROCEDURE — 85610 PROTHROMBIN TIME: CPT | Mod: 91

## 2018-03-08 PROCEDURE — 85730 THROMBOPLASTIN TIME PARTIAL: CPT

## 2018-03-08 PROCEDURE — 770020 HCHG ROOM/CARE - TELE (206)

## 2018-03-08 RX ORDER — POLYETHYLENE GLYCOL 3350 17 G/17G
1 POWDER, FOR SOLUTION ORAL
Status: DISCONTINUED | OUTPATIENT
Start: 2018-03-08 | End: 2018-03-10 | Stop reason: HOSPADM

## 2018-03-08 RX ORDER — AMOXICILLIN 250 MG
2 CAPSULE ORAL 2 TIMES DAILY
Status: DISCONTINUED | OUTPATIENT
Start: 2018-03-08 | End: 2018-03-10 | Stop reason: HOSPADM

## 2018-03-08 RX ORDER — BISACODYL 10 MG
10 SUPPOSITORY, RECTAL RECTAL
Status: DISCONTINUED | OUTPATIENT
Start: 2018-03-08 | End: 2018-03-10 | Stop reason: HOSPADM

## 2018-03-08 RX ORDER — ONDANSETRON 4 MG/1
4 TABLET, ORALLY DISINTEGRATING ORAL EVERY 4 HOURS PRN
Status: DISCONTINUED | OUTPATIENT
Start: 2018-03-08 | End: 2018-03-10 | Stop reason: HOSPADM

## 2018-03-08 RX ORDER — WARFARIN SODIUM 7.5 MG/1
7.5 TABLET ORAL DAILY
Status: ON HOLD | COMMUNITY
End: 2018-03-10

## 2018-03-08 RX ORDER — HYDROCORTISONE ACETATE 25 MG/1
25 SUPPOSITORY RECTAL EVERY 12 HOURS
Qty: 28 SUPPOSITORY | Refills: 0 | Status: SHIPPED | OUTPATIENT
Start: 2018-03-08 | End: 2018-03-08

## 2018-03-08 RX ORDER — SODIUM CHLORIDE 9 MG/ML
INJECTION, SOLUTION INTRAVENOUS CONTINUOUS
Status: DISCONTINUED | OUTPATIENT
Start: 2018-03-08 | End: 2018-03-08

## 2018-03-08 RX ORDER — ENALAPRILAT 1.25 MG/ML
1.25 INJECTION INTRAVENOUS EVERY 6 HOURS PRN
Status: DISCONTINUED | OUTPATIENT
Start: 2018-03-08 | End: 2018-03-10 | Stop reason: HOSPADM

## 2018-03-08 RX ORDER — SODIUM CHLORIDE 9 MG/ML
INJECTION, SOLUTION INTRAVENOUS CONTINUOUS
Status: DISCONTINUED | OUTPATIENT
Start: 2018-03-08 | End: 2018-03-10 | Stop reason: HOSPADM

## 2018-03-08 RX ORDER — ACETAMINOPHEN 325 MG/1
650 TABLET ORAL EVERY 6 HOURS PRN
Status: DISCONTINUED | OUTPATIENT
Start: 2018-03-08 | End: 2018-03-10 | Stop reason: HOSPADM

## 2018-03-08 RX ORDER — ONDANSETRON 2 MG/ML
4 INJECTION INTRAMUSCULAR; INTRAVENOUS EVERY 4 HOURS PRN
Status: DISCONTINUED | OUTPATIENT
Start: 2018-03-08 | End: 2018-03-10 | Stop reason: HOSPADM

## 2018-03-08 RX ORDER — LABETALOL HYDROCHLORIDE 5 MG/ML
10 INJECTION, SOLUTION INTRAVENOUS EVERY 4 HOURS PRN
Status: DISCONTINUED | OUTPATIENT
Start: 2018-03-08 | End: 2018-03-10 | Stop reason: HOSPADM

## 2018-03-08 RX ADMIN — PHYTONADIONE 10 MG: 10 INJECTION, EMULSION INTRAMUSCULAR; INTRAVENOUS; SUBCUTANEOUS at 17:36

## 2018-03-08 RX ADMIN — SODIUM CHLORIDE 1000 ML: 9 INJECTION, SOLUTION INTRAVENOUS at 16:12

## 2018-03-08 RX ADMIN — SODIUM CHLORIDE: 9 INJECTION, SOLUTION INTRAVENOUS at 20:30

## 2018-03-08 ASSESSMENT — ENCOUNTER SYMPTOMS
NAUSEA: 0
SORE THROAT: 0
DIARRHEA: 0
DIZZINESS: 1
SHORTNESS OF BREATH: 0
WHEEZING: 0
HEARTBURN: 0
BLOOD IN STOOL: 1
HEADACHES: 0
WEAKNESS: 1
FEVER: 0
COUGH: 0
ABDOMINAL PAIN: 0
VOMITING: 0
BLURRED VISION: 0
CHILLS: 0

## 2018-03-08 ASSESSMENT — LIFESTYLE VARIABLES
TOTAL SCORE: 0
HAVE YOU EVER FELT YOU SHOULD CUT DOWN ON YOUR DRINKING: NO
TOTAL SCORE: 0
EVER FELT BAD OR GUILTY ABOUT YOUR DRINKING: NO
TOTAL SCORE: 0
HOW MANY TIMES IN THE PAST YEAR HAVE YOU HAD 5 OR MORE DRINKS IN A DAY: 0
CONSUMPTION TOTAL: NEGATIVE
ON A TYPICAL DAY WHEN YOU DRINK ALCOHOL HOW MANY DRINKS DO YOU HAVE: 1
AVERAGE NUMBER OF DAYS PER WEEK YOU HAVE A DRINK CONTAINING ALCOHOL: 1
HAVE PEOPLE ANNOYED YOU BY CRITICIZING YOUR DRINKING: NO
ALCOHOL_USE: YES
EVER_SMOKED: NEVER
EVER HAD A DRINK FIRST THING IN THE MORNING TO STEADY YOUR NERVES TO GET RID OF A HANGOVER: NO

## 2018-03-08 ASSESSMENT — PATIENT HEALTH QUESTIONNAIRE - PHQ9
2. FEELING DOWN, DEPRESSED, IRRITABLE, OR HOPELESS: NOT AT ALL
1. LITTLE INTEREST OR PLEASURE IN DOING THINGS: NOT AT ALL
SUM OF ALL RESPONSES TO PHQ9 QUESTIONS 1 AND 2: 0
SUM OF ALL RESPONSES TO PHQ QUESTIONS 1-9: 0

## 2018-03-08 NOTE — TELEPHONE ENCOUNTER
Pt called stated she passed more blood in her bm. Pt states it was a lot of bright red blood. Advised pt she needs to go to ER since sh is on coumadin -pt is on her way

## 2018-03-08 NOTE — PATIENT INSTRUCTIONS
1. Internal hemorrhoid  CBC WITH DIFFERENTIAL    COMP METABOLIC PANEL    OCCULT BLOOD FECES IMMUNOASSAY    REFERRAL TO GASTROENTEROLOGY    hydrocortisone (ANUSOL-HC) 25 MG Suppos    do anusol.  then redo FIT test.  refer GI   2. Rectal bleed  CBC WITH DIFFERENTIAL    COMP METABOLIC PANEL    OCCULT BLOOD FECES IMMUNOASSAY    REFERRAL TO GASTROENTEROLOGY    hydrocortisone (ANUSOL-HC) 25 MG Suppos    PROTHROMBIN TIME   3. Occult blood in stools  CBC WITH DIFFERENTIAL    COMP METABOLIC PANEL    OCCULT BLOOD FECES IMMUNOASSAY    REFERRAL TO GASTROENTEROLOGY    hydrocortisone (ANUSOL-HC) 25 MG Suppos   4. Atrial fibrillation with RVR (CMS-HCC)  PROTHROMBIN TIME   5. Chronic anticoagulation  PROTHROMBIN TIME    pharmd referred to coumadin clinic last week.  pt to f/u asap. do INR today since do not know date of last inr.  f/u with pt wi results.   6. Breast nodule  US-BREAST COMPLETE-RIGHT    do breast us.  f/u 2 weeks for sx eval.  do lab asap   7. Traumatic ecchymosis of right female breast, initial encounter  PROTHROMBIN TIME   8. History of left breast cancer  US-BREAST COMPLETE-RIGHT   9.  Set follow up appt in 2 weeks.  Will do MMSE

## 2018-03-08 NOTE — PROGRESS NOTES
Subjective:     Fabien Linares is a 88 y.o. female who presents with rectal bleeding.    HPI:   Seen in f/u for rectal bleeding.  Today she was having a bm.  She started the process and only bright red blood came out.  Has a hx of hemorrhoids.  None recently.  No abd or back pain.  No fever, chills or sweating.  Normal stools until today.    She has a rt breast bruise.  Noted recently.  There is a nodule in the middle of the bruise.  Pt has hx of breast cancer on left long ago.  She doesn't remember hitting her breast.   She is on coumadin for her afib.  No sx of afib.  She is followed by [facility] who is off for a year.  Not due to f/u with them until oct.  She has not been f/u with coumadin clinic.   Her pharmacist placed a coumadin clinic referral last week.      Patient Active Problem List    Diagnosis Date Noted   • Dyslipidemia 10/20/2017   • Diarrhea of presumed infectious origin 06/14/2017   • Preventative health care 03/05/2014   • GERD (gastroesophageal reflux disease)    • Bell's palsy    • Osteoporosis    • Colon polyps    • Atrial fibrillation with RVR (CMS-[facility]) 01/08/2014       Current medicines (including changes today)  Current Outpatient Prescriptions   Medication Sig Dispense Refill   • hydrocortisone (ANUSOL-HC) 25 MG Suppos Insert 1 Suppository in rectum every 12 hours. 28 Suppository 0   • warfarin (COUMADIN) 5 MG Tab Take 1-1.5 Tabs by mouth every day. 30 Tab 0   • metoprolol SR (TOPROL XL) 25 MG TABLET SR 24 HR Take 0.5 Tabs by mouth every bedtime. 45 Tab 3   • atorvastatin (LIPITOR) 40 MG Tab Take 1 Tab by mouth every bedtime. Needs to be seen for further refills. Thank you 90 Tab 3     No current facility-administered medications for this visit.        Allergies   Allergen Reactions   • Actonel [Risedronate Sodium]      Leg pain   • Amoxicillin    • Morphine Sulfate      Patient became A Fib   • Naproxen      unknown rxn; doctor said not to take   • Vicodin [Hydrocodone-Acetaminophen]   "    Unknown rxn; doctor said not to take       ROS  Constitutional: Negative. Negative for fever, chills, weight loss, malaise/fatigue and diaphoresis.   HENT: Negative. Negative for hearing loss, ear pain, nosebleeds, congestion, sore throat, neck pain, tinnitus and ear discharge.   Respiratory: Negative. Negative for cough, hemoptysis, sputum production, shortness of breath, wheezing and stridor.   Cardiovascular: Negative. Negative for chest pain, palpitations, orthopnea, claudication, leg swelling and PND.   Gastrointestinal: Denies nausea, vomiting, diarrhea, constipation, heartburn.  Genitourinary: Denies dysuria, hematuria, urinary incontinence, frequency or urgency.        Objective:     Blood pressure 122/88, pulse 70, temperature 36.6 °C (97.8 °F), height 1.549 m (5' 1\"), weight 60.8 kg (134 lb), SpO2 92 %, not currently breastfeeding. Body mass index is 25.32 kg/m².    Physical Exam:  .Physical Exam   Vitals reviewed.  Constitutional: oriented to person, place, and time. appears well-developed and well-nourished. No distress.   Cardiovascular: Normal rate, regular rhythm, normal heart sounds and intact distal pulses.  Exam reveals no gallop and no friction rub.  No murmur heard.  No carotid bruits.   Pulmonary/Chest: Effort normal and breath sounds normal. No stridor. No respiratory distress. no wheezes or rales. exhibits no tenderness.   Musculoskeletal: Normal range of motion. exhibits no edema. bridgette pedal pulses 2+.  Lymphadenopathy: no cervical or supraclavicular adenopathy.   Abd:  No CVAT,  Soft,  Bs noted in all quadrants.  No HSM.  No abdominal tenderness.  Neurological: alert and oriented to person, place, and time. exhibits normal muscle tone. Coordination normal.   Skin: Skin is warm and dry. no diaphoresis.   Psychiatric: normal mood and affect. behavior is normal.   Rectal: sphincter tone normal, stool guaiac very mildly positive.  Internal hemorrhoid palp  right breast medial inferior " quadrant with ecchymosis.  Nodule palp in middle of bruise.  Poss hematoma              Assessment and Plan:     The following treatment plan was discussed:    1. Internal hemorrhoid  CBC WITH DIFFERENTIAL    COMP METABOLIC PANEL    OCCULT BLOOD FECES IMMUNOASSAY    REFERRAL TO GASTROENTEROLOGY    hydrocortisone (ANUSOL-HC) 25 MG Suppos    do anusol.  then redo FIT test.  refer GI   2. Rectal bleed  CBC WITH DIFFERENTIAL    COMP METABOLIC PANEL    OCCULT BLOOD FECES IMMUNOASSAY    REFERRAL TO GASTROENTEROLOGY    hydrocortisone (ANUSOL-HC) 25 MG Suppos    PROTHROMBIN TIME   3. Occult blood in stools  CBC WITH DIFFERENTIAL    COMP METABOLIC PANEL    OCCULT BLOOD FECES IMMUNOASSAY    REFERRAL TO GASTROENTEROLOGY    hydrocortisone (ANUSOL-HC) 25 MG Suppos   4. Atrial fibrillation with RVR (CMS-HCC)  PROTHROMBIN TIME   5. Chronic anticoagulation  PROTHROMBIN TIME    pharmd referred to coumadin clinic last week.  pt to f/u asap. do INR today since do not know date of last inr.  f/u with pt wi results.   6. Breast nodule  US-BREAST COMPLETE-RIGHT    do breast us.  f/u 2 weeks for sx eval.  do lab asap   7. Traumatic ecchymosis of right female breast, initial encounter  PROTHROMBIN TIME   8. History of left breast cancer  US-BREAST COMPLETE-RIGHT         Followup: Return in about 2 weeks (around 3/22/2018).   Do MMSE at f/u appt as she is still driving

## 2018-03-08 NOTE — ED NOTES
"Chief Complaint   Patient presents with   • Bloody Stools     Pt began to have bloody diarrhea this am, 2 episodes, bright red blood. Saw PCP and was sent to ED. Pt takes Coumadin. Denies pain, N/V.      /90   Pulse 85   Temp 37.6 °C (99.6 °F)   Resp 18   Ht 1.549 m (5' 1\")   Wt 61.2 kg (134 lb 14.7 oz)   SpO2 94%   BMI 25.49 kg/m²     "

## 2018-03-09 LAB
ANION GAP SERPL CALC-SCNC: 3 MMOL/L (ref 0–11.9)
BASOPHILS # BLD AUTO: 0.6 % (ref 0–1.8)
BASOPHILS # BLD: 0.03 K/UL (ref 0–0.12)
BUN SERPL-MCNC: 16 MG/DL (ref 8–22)
CALCIUM SERPL-MCNC: 8.1 MG/DL (ref 8.4–10.2)
CHLORIDE SERPL-SCNC: 113 MMOL/L (ref 96–112)
CO2 SERPL-SCNC: 25 MMOL/L (ref 20–33)
CREAT SERPL-MCNC: 0.69 MG/DL (ref 0.5–1.4)
EOSINOPHIL # BLD AUTO: 0.19 K/UL (ref 0–0.51)
EOSINOPHIL NFR BLD: 4 % (ref 0–6.9)
ERYTHROCYTE [DISTWIDTH] IN BLOOD BY AUTOMATED COUNT: 45.5 FL (ref 35.9–50)
GLUCOSE SERPL-MCNC: 89 MG/DL (ref 65–99)
HCT VFR BLD AUTO: 35.2 % (ref 37–47)
HGB BLD-MCNC: 11.9 G/DL (ref 12–16)
HGB BLD-MCNC: 12.7 G/DL (ref 12–16)
HGB BLD-MCNC: 13.6 G/DL (ref 12–16)
IMM GRANULOCYTES # BLD AUTO: 0.02 K/UL (ref 0–0.11)
IMM GRANULOCYTES NFR BLD AUTO: 0.4 % (ref 0–0.9)
INR PPP: 1.59 (ref 0.87–1.13)
LYMPHOCYTES # BLD AUTO: 1.11 K/UL (ref 1–4.8)
LYMPHOCYTES NFR BLD: 23.6 % (ref 22–41)
MCH RBC QN AUTO: 33.5 PG (ref 27–33)
MCHC RBC AUTO-ENTMCNC: 33.8 G/DL (ref 33.6–35)
MCV RBC AUTO: 99.2 FL (ref 81.4–97.8)
MONOCYTES # BLD AUTO: 0.46 K/UL (ref 0–0.85)
MONOCYTES NFR BLD AUTO: 9.8 % (ref 0–13.4)
NEUTROPHILS # BLD AUTO: 2.9 K/UL (ref 2–7.15)
NEUTROPHILS NFR BLD: 61.6 % (ref 44–72)
NRBC # BLD AUTO: 0 K/UL
NRBC BLD-RTO: 0 /100 WBC
PLATELET # BLD AUTO: 122 K/UL (ref 164–446)
PMV BLD AUTO: 9.7 FL (ref 9–12.9)
POTASSIUM SERPL-SCNC: 3.6 MMOL/L (ref 3.6–5.5)
PROTHROMBIN TIME: 18.5 SEC (ref 12–14.6)
RBC # BLD AUTO: 3.55 M/UL (ref 4.2–5.4)
SODIUM SERPL-SCNC: 141 MMOL/L (ref 135–145)
WBC # BLD AUTO: 4.7 K/UL (ref 4.8–10.8)

## 2018-03-09 PROCEDURE — 85610 PROTHROMBIN TIME: CPT

## 2018-03-09 PROCEDURE — 80048 BASIC METABOLIC PNL TOTAL CA: CPT

## 2018-03-09 PROCEDURE — 85018 HEMOGLOBIN: CPT | Mod: 91

## 2018-03-09 PROCEDURE — 85025 COMPLETE CBC W/AUTO DIFF WBC: CPT

## 2018-03-09 PROCEDURE — 99232 SBSQ HOSP IP/OBS MODERATE 35: CPT | Performed by: HOSPITALIST

## 2018-03-09 PROCEDURE — 700102 HCHG RX REV CODE 250 W/ 637 OVERRIDE(OP): Performed by: FAMILY MEDICINE

## 2018-03-09 PROCEDURE — A9270 NON-COVERED ITEM OR SERVICE: HCPCS | Performed by: FAMILY MEDICINE

## 2018-03-09 PROCEDURE — 700105 HCHG RX REV CODE 258: Performed by: FAMILY MEDICINE

## 2018-03-09 PROCEDURE — 770020 HCHG ROOM/CARE - TELE (206)

## 2018-03-09 RX ADMIN — SODIUM CHLORIDE: 9 INJECTION, SOLUTION INTRAVENOUS at 20:30

## 2018-03-09 RX ADMIN — ACETAMINOPHEN 650 MG: 325 TABLET, FILM COATED ORAL at 11:47

## 2018-03-09 RX ADMIN — ACETAMINOPHEN 650 MG: 325 TABLET, FILM COATED ORAL at 00:05

## 2018-03-09 RX ADMIN — ACETAMINOPHEN 650 MG: 325 TABLET, FILM COATED ORAL at 18:09

## 2018-03-09 ASSESSMENT — PATIENT HEALTH QUESTIONNAIRE - PHQ9
2. FEELING DOWN, DEPRESSED, IRRITABLE, OR HOPELESS: NOT AT ALL
SUM OF ALL RESPONSES TO PHQ QUESTIONS 1-9: 0
SUM OF ALL RESPONSES TO PHQ9 QUESTIONS 1 AND 2: 0
1. LITTLE INTEREST OR PLEASURE IN DOING THINGS: NOT AT ALL

## 2018-03-09 ASSESSMENT — ENCOUNTER SYMPTOMS
HEMOPTYSIS: 0
BLURRED VISION: 0
ABDOMINAL PAIN: 1
DIAPHORESIS: 0
WEIGHT LOSS: 0
COUGH: 0
NECK PAIN: 0
NAUSEA: 1
DIARRHEA: 1
BLOOD IN STOOL: 1
MYALGIAS: 0
DOUBLE VISION: 0
DEPRESSION: 0
VOMITING: 1
CONSTIPATION: 1
WEAKNESS: 1
PALPITATIONS: 0

## 2018-03-09 ASSESSMENT — PAIN SCALES - GENERAL
PAINLEVEL_OUTOF10: 0

## 2018-03-09 ASSESSMENT — COPD QUESTIONNAIRES: DURING THE PAST 4 WEEKS HOW MUCH DID YOU FEEL SHORT OF BREATH: NONE/LITTLE OF THE TIME

## 2018-03-09 NOTE — ED PROVIDER NOTES
ED Provider Note    CHIEF COMPLAINT  Chief Complaint   Patient presents with   • Bloody Stools     Pt began to have bloody diarrhea this am, 2 episodes, bright red blood. Saw PCP and was sent to ED. Pt takes Coumadin. Denies pain, N/V.        HPI  Fabien Linares is a 88 y.o. female who presents with bright red bleeding per rectum. The patient had an episode of bright red bleeding this morning. She saw her personal doctor who felt she probably had a hemorrhoid. Was sent for outpatient laboratories. In the interim, she had another episode of very bright red bleeding per rectum. She describes quite a bit. She did bump her head on the cabinet the other day, also is bruise over her chest, she denies any particular headache. Denies any loss of consciousness. She feels somewhat out of it, but does not describe syncope or near-syncope. She's not had her Coumadin level checked for quite some time. No changes here. She is on this because of atrial fibrillation. There is no other complaint.    PAST MEDICAL HISTORY  Past Medical History:   Diagnosis Date   • Atrial fibrillation with RVR (CMS-HCC)    • Bell's palsy    • Colon polyps    • Diarrhea of infectious origin    • GERD (gastroesophageal reflux disease)    • Osteoporosis, unspecified        FAMILY HISTORY  Family History   Problem Relation Age of Onset   • Cancer Sister        SOCIAL HISTORY  Social History   Substance Use Topics   • Smoking status: Never Smoker   • Smokeless tobacco: Never Used   • Alcohol use 0.0 oz/week      Comment: occ wine         SURGICAL HISTORY  Past Surgical History:   Procedure Laterality Date   • APPENDECTOMY     • INGUINAL HERNIA REPAIR      Hernia Repair, Inguinal   • LUMPECTOMY     • OTHER ORTHOPEDIC SURGERY      foot surgery bilat   • VAGINAL HYSTERECTOMY TOTAL      Hysterectomy,Total Vaginal       CURRENT MEDICATIONS  Home Medications     Reviewed by Nikki Lockhart (Pharmacy Tech) on 03/08/18 at 1603  Med List Status:  "Complete   Medication Last Dose Status   atorvastatin (LIPITOR) 40 MG Tab 3/7/2018 Active   metoprolol SR (TOPROL XL) 25 MG TABLET SR 24 HR 3/7/2018 Active   warfarin (COUMADIN) 7.5 MG Tab 3/7/2018 Active                I have reviewed the nurses notes and/or the list brought with the patient.    ALLERGIES  Allergies   Allergen Reactions   • Actonel [Risedronate Sodium]      Leg pain   • Amoxicillin    • Morphine Sulfate      Patient became A Fib   • Naproxen      unknown rxn; doctor said not to take   • Vicodin [Hydrocodone-Acetaminophen]      Unknown rxn; doctor said not to take       REVIEW OF SYSTEMS  See HPI for further details. Review of systems as above, otherwise all other systems are negative.     PHYSICAL EXAM  VITAL SIGNS: /90   Pulse 75   Temp 37.6 °C (99.6 °F)   Resp 18   Ht 1.549 m (5' 1\")   Wt 61.2 kg (134 lb 14.7 oz)   SpO2 95%   BMI 25.49 kg/m²   Constitutional: Well appearing patient in no acute distress.  Not toxic, nor ill in appearance.  HENT: Mucus membranes moist.  Oropharynx is clear.  Eyes: Pupils equally round.  No scleral icterus.   Neck: Full nontender range of motion.  Lymphatic: No cervical lymphadenopathy noted.   Cardiovascular: Somewhat irregular.  No murmurs, rubs, nor gallop appreciated.   Thorax & Lungs: Chest is nontender.  Lungs are clear to auscultation with good air movement bilaterally.  No wheeze, rhonchi, nor rales.   Abdomen: Soft, with no tenderness, rebound nor guarding.  No mass, pulsatile mass, nor hepatosplenomegaly appreciated. Rectal exam shows no obvious thrombosed hemorrhoids. There is no tenderness. She has formed, firm stool in the vault. No bright red blood on BOWEN.  Skin: Scattered purpura. This includes an older appearing ecchymosis over her forehead. No petechiae.  Extremities/Musculoskeletal: No sign of trauma.  Calves are nontender with no cords nor edema.  No Dennise's sign.  Pulses are intact all around.   Neurologic: Alert & oriented.  " Strength and sensation is intact all around.  Gait is normal.  Psychiatric: Normal affect appropriate for the clinical situation.    LABS  Labs Reviewed   CBC WITH DIFFERENTIAL - Abnormal; Notable for the following:        Result Value    RBC 4.19 (*)     MCH 33.7 (*)     Platelet Count 151 (*)     Neutrophils-Polys 73.90 (*)     Lymphocytes 15.10 (*)     All other components within normal limits    Narrative:     Indicate which anticoagulants the patient is on:->UNKNOWN   COMP METABOLIC PANEL - Abnormal; Notable for the following:     Chloride 113 (*)     Bun 25 (*)     All other components within normal limits    Narrative:     Indicate which anticoagulants the patient is on:->UNKNOWN   PROTHROMBIN TIME - Abnormal; Notable for the following:     PT 97.2 (*)     INR >=10.00 (*)     All other components within normal limits    Narrative:     Indicate which anticoagulants the patient is on:->UNKNOWN   APTT - Abnormal; Notable for the following:     APTT 64.3 (*)     All other components within normal limits    Narrative:     Indicate which anticoagulants the patient is on:->UNKNOWN   COD (ADULT)   ESTIMATED GFR    Narrative:     Indicate which anticoagulants the patient is on:->UNKNOWN   FRESH FROZEN PLASMA   ABO AND RH CONFIRMATION   TRANSFUSE FRESH FROZEN PLASMA-NURSING COMMUNICATION         MEDICAL RECORD  I have reviewed patient's medical record and pertinent results are listed above.    COURSE & MEDICAL DECISION MAKING  I have reviewed any medical record information, laboratory studies and radiographic results as noted above.  This patient presents with bright red bleeding per rectum. Presently she is hemodynamically normal. However, she is anticoagulated and laboratories returned showing a extremely elevated INR. At this point I think that the benefits of reversing her anticoagulation outweighs the benefit of the risk. We'll start vitamin K as well as FFP. Case discussed with Dr. Wyatt, and she is  admitted.      FINAL IMPRESSION  1. BRBPR (bright red blood per rectum)    2. Overdose of anticoagulant, accidental or unintentional, initial encounter           This dictation was created using voice recognition software.    Electronically signed by: Zack Reed, 3/8/2018 5:18 PM

## 2018-03-09 NOTE — PROGRESS NOTES
"1918- Patient arrived to the floor via gurney. Independent ambulation to bed. Telemetry placed on patient. Admission completed. Answered questions. Patient is very forgetful and reports feeling \"foggy\". Bed alarm turned on. Fluids running.     2100- Report of bloody stool. Patient was very fearful and was educated on what to expect    2245- Bed alarm went off. Patient stated she was scared and confused. Still feeling \"foggy\". Assisted into the bathroom after reorienting.    2315- Lab at bedside    0100- Patient is good with using her call light. Requires frequent reorientation and education. She understand everything said to her and can teach back the information however forgets the entire conversation within moments.    0419- Still having bloody stools. Labs ordered this am. Bed alarm in place. Patient is still experiencing memory loss with confusion. Easy to reorient but still forgets quickly. Fluids running. Patient is NPO currently.    SR- NO ECTOPY  KS 0.20  QRS 0.08  QT 0.40    "

## 2018-03-09 NOTE — TELEPHONE ENCOUNTER
i am internal medicine.  If they don't want to see me they can set up with an provider of their choice.

## 2018-03-09 NOTE — ASSESSMENT & PLAN NOTE
Acute, likely due to supratherpeutic INR, no hematochezia or melena since admission  Serial hemoglobin, hgb stable  Hold Coumadin, monitor INR

## 2018-03-09 NOTE — FACE TO FACE
Face to Face Supporting Documentation - Home Health    The encounter with this patient was in whole or in part the primary reason for home health admission.    Date of encounter:   Patient:                    MRN:                       YOB: 2018  Fabien Linares  [id number]  5/27/1929     Home health to see patient for:  Skilled Nursing care for assessment, interventions & education, Home health aide, Physical Therapy evaluation and treatment and Occupational therapy evaluation and treatment    Skilled need for:  Exacerbation of Chronic Disease State GI bleeding and New Onset Medical Diagnosis uncontrolled inr    Skilled nursing interventions to include:  Comment: INR check/PT/OT    Homebound status evidenced by:  Need the aid of supportive devices such as crutches, canes, wheelchairs or walkers, Require the use of special transportation, Needs the assistance of another person in order to leave the home or Have a condition such that leaving his or her home is medically contraindicated. Leaving home requires a considerable and taxing effort. There is a normal inability to leave the home.    Community Physician to provide follow up care: HARVINDER Browne     Optional Interventions? No      I certify the face to face encounter for this home health care referral meets the CMS requirements and the encounter/clinical assessment with the patient was, in whole, or in part, for the medical condition(s) listed above, which is the primary reason for home health care. Based on my clinical findings: the service(s) are medically necessary, support the need for home health care, and the homebound criteria are met.  I certify that this patient has had a face to face encounter by myself.  Mattie Markham M.D. - NPI: [id number]

## 2018-03-09 NOTE — CARE PLAN
Problem: Safety  Goal: Will remain free from injury  Outcome: PROGRESSING AS EXPECTED  Pt calls appropriately and is steady on her feet

## 2018-03-09 NOTE — H&P
Hospital Medicine History and Physical    Date of Service  3/8/2018    Chief Complaint  Chief Complaint   Patient presents with   • Bloody Stools     Pt began to have bloody diarrhea this am, 2 episodes, bright red blood. Saw PCP and was sent to ED. Pt takes Coumadin. Denies pain, N/V.        History of Presenting Illness  88 y.o. female who presented 3/8/2018 with 30 stools of 2 episodes today 1 dose in the morning and one was in the early afternoon. She has some dizziness, denies any headache, chest pain palpitations shortness of breath. She denies any abdominal pain nausea or vomiting. Denies having any fever or chills or diarrhea. Patient is on Coumadin for paroxysmal atrial fibrillation, her last INR check was back in October. Apparently her spouse passed away in October and she has been somewhat negligent on her medical care. Her INR today is more than 10. Rectal exam was done by the ER physician there is no gross blood noted on exam brown stool is noted. Hemoglobin is stable currently. She is not hypotensive. She actually appears to be in sinus rhythm currently. She is somewhat apprehensive and anxious about reversing her INR. She also has a POLST, which states DNR, she wants to be full code for now as her daughter is in [place]     Primary Care Physician  MARQUES Browne.    Consultants  None    Code Status  Full    Review of Systems  Review of Systems   Constitutional: Positive for malaise/fatigue. Negative for chills and fever.   HENT: Negative for hearing loss and sore throat.    Eyes: Negative for blurred vision.   Respiratory: Negative for cough, shortness of breath and wheezing.    Cardiovascular: Negative for chest pain and leg swelling.   Gastrointestinal: Positive for blood in stool. Negative for abdominal pain, diarrhea, heartburn, melena, nausea and vomiting.   Genitourinary: Negative for dysuria.   Neurological: Positive for dizziness and weakness. Negative for headaches.        Past Medical  History  Past Medical History:   Diagnosis Date   • HTN (hypertension) 3/8/2018   • Atrial fibrillation with RVR (CMS-HCC)    • Bell's palsy    • Colon polyps    • Diarrhea of infectious origin    • GERD (gastroesophageal reflux disease)    • Memory impairment    • Osteoporosis, unspecified        Surgical History  Past Surgical History:   Procedure Laterality Date   • APPENDECTOMY     • INGUINAL HERNIA REPAIR      Hernia Repair, Inguinal   • LUMPECTOMY     • OTHER ORTHOPEDIC SURGERY      foot surgery bilat   • VAGINAL HYSTERECTOMY TOTAL      Hysterectomy,Total Vaginal       Medications  No current facility-administered medications on file prior to encounter.      Current Outpatient Prescriptions on File Prior to Encounter   Medication Sig Dispense Refill   • metoprolol SR (TOPROL XL) 25 MG TABLET SR 24 HR Take 0.5 Tabs by mouth every bedtime. 45 Tab 3   • atorvastatin (LIPITOR) 40 MG Tab Take 1 Tab by mouth every bedtime. Needs to be seen for further refills. Thank you 90 Tab 3       Family History  Family History   Problem Relation Age of Onset   • Cancer Sister        Social History  Social History   Substance Use Topics   • Smoking status: Never Smoker   • Smokeless tobacco: Never Used   • Alcohol use 0.0 oz/week      Comment: occ wine       Allergies  Allergies   Allergen Reactions   • Actonel [Risedronate Sodium]      Leg pain   • Amoxicillin    • Morphine Sulfate      Patient became A Fib   • Naproxen      unknown rxn; doctor said not to take   • Vicodin [Hydrocodone-Acetaminophen]      Unknown rxn; doctor said not to take        Physical Exam  Laboratory   Hemodynamics  Temp (24hrs), Av.6 °C (99.6 °F), Min:37.6 °C (99.6 °F), Max:37.6 °C (99.6 °F)   Temperature: 37.6 °C (99.6 °F)  Pulse  Av  Min: 69  Max: 85 Heart Rate (Monitored): 77  Blood Pressure : 156/90, NIBP: 140/66      Respiratory      Respiration: 18, Pulse Oximetry: 94 %             Physical Exam   Constitutional: She appears  well-developed and well-nourished.   HENT:   Head: Normocephalic and atraumatic.   Eyes: Conjunctivae are normal. Pupils are equal, round, and reactive to light.   Neck: No tracheal deviation present. No thyromegaly present.   Cardiovascular: Normal rate and regular rhythm.    Murmur heard.  Abdominal: Soft. Bowel sounds are normal. She exhibits no distension. There is no tenderness.   Lymphadenopathy:     She has no cervical adenopathy.   Neurological: She is alert.   Oriented to person and place   Skin: Skin is warm and dry.   Nursing note and vitals reviewed.      Recent Labs      03/08/18   1042  03/08/18   1609   WBC  5.8  7.8   RBC  4.32  4.19*   HEMOGLOBIN  14.5  14.1   HEMATOCRIT  43.5  40.9   MCV  100.7*  97.6   MCH  33.6*  33.7*   MCHC  33.3*  34.5   RDW  46.8  45.1   PLATELETCT  146*  151*   MPV  9.9  9.4     Recent Labs      03/08/18   1609   SODIUM  140   POTASSIUM  3.8   CHLORIDE  113*   CO2  20   GLUCOSE  86   BUN  25*   CREATININE  0.74   CALCIUM  9.3     Recent Labs      03/08/18   1609   ALTSGPT  21   ASTSGOT  28   ALKPHOSPHAT  78   TBILIRUBIN  1.3   GLUCOSE  86     Recent Labs      03/08/18   1042  03/08/18   1609   APTT   --   64.3*   INR  >=10.00*  >=10.00*             Lab Results   Component Value Date    TROPONINI 0.01 01/08/2014     Urinalysis:    Lab Results  Component Value Date/Time   SPECGRAVITY <=1.005 05/04/2010 1530   GLUCOSEUR >=1000 (A) 05/04/2010 1530   KETONES Trace (A) 05/04/2010 1530   NITRITE Negative 05/04/2010 1530   WBCURINE 0-2 02/22/2005 1205   RBCURINE 2-5 (A) 05/04/2010 1530   BACTERIA Rare (A) 02/22/2005 1205   EPITHELCELL Few 05/04/2010 1530        Imaging  None   Assessment/Plan     I anticipate this patient will require at least two midnights for appropriate medical management, necessitating inpatient admission.    * GIB (gastrointestinal bleeding)- (present on admission)   Assessment & Plan    Serial hemoglobin  Hold Coumadin        Hemorrhagic disorder due to  extrinsic circulating anticoagulants (CMS-HCC)- (present on admission)   Assessment & Plan    Hold Coumadin        Supratherapeutic INR- (present on admission)   Assessment & Plan    Vitamin K and FFP  Follow INR        Memory impairment- (present on admission)   Assessment & Plan    Frequent orientation, avoid benzodiazepines and anticholinergics        HTN (hypertension)- (present on admission)   Assessment & Plan    Hold Toprol-XL        Paroxysmal atrial fibrillation (CMS-HCC)- (present on admission)   Assessment & Plan    Hold Toprol-XL and Coumadin        Dyslipidemia- (present on admission)   Assessment & Plan    Hold Lipitor            VTE prophylaxis: SCD.

## 2018-03-09 NOTE — CARE PLAN
Problem: Communication  Goal: The ability to communicate needs accurately and effectively will improve  Outcome: PROGRESSING SLOWER THAN EXPECTED  Slurred speech, forgetfulness, confusion at times    Problem: Safety  Goal: Will remain free from injury  Outcome: PROGRESSING AS EXPECTED    Goal: Will remain free from falls  Outcome: PROGRESSING AS EXPECTED      Problem: Infection  Goal: Will remain free from infection  Outcome: PROGRESSING AS EXPECTED      Problem: Knowledge Deficit  Goal: Knowledge of disease process/condition, treatment plan, diagnostic tests, and medications will improve  Outcome: PROGRESSING SLOWER THAN EXPECTED  Forgetfulness is delaying understanding of treatment and condition. Provide education which she understands and can teach back however this education is forgotten after a short time  Goal: Knowledge of the prescribed therapeutic regimen will improve  Outcome: PROGRESSING AS EXPECTED      Problem: Discharge Barriers/Planning  Goal: Patient's continuum of care needs will be met  Outcome: PROGRESSING AS EXPECTED      Problem: Medication  Goal: Compliance with prescribed medication will improve  Outcome: PROGRESSING AS EXPECTED      Problem: Skin Integrity  Goal: Risk for impaired skin integrity will decrease  Outcome: PROGRESSING AS EXPECTED

## 2018-03-09 NOTE — TELEPHONE ENCOUNTER
Pt went to ER pt had pt 99. INR 10.00. Pt daughter mayelin is asking if you can refer pt to internal med doc please.

## 2018-03-09 NOTE — PROGRESS NOTES
[facility]ist Progress Note    Date of Service: 3/9/2018    Chief Complaint  88 y.o. female admitted 3/8/2018 with cute GI bleed and supratherapeutic INR    Interval Problem Update  3/9: no gi bleeding since admission, no abd pain, hungry wants to eat, no nausea    Consultants/Specialty  none    Disposition  none        Review of Systems   Constitutional: Negative for diaphoresis, malaise/fatigue and weight loss.   HENT: Positive for hearing loss. Negative for ear pain.    Eyes: Negative for blurred vision and double vision.   Respiratory: Negative for cough and hemoptysis.    Cardiovascular: Negative for chest pain and palpitations.   Gastrointestinal: Positive for abdominal pain, blood in stool, constipation, diarrhea, nausea and vomiting.   Genitourinary: Negative for dysuria and urgency.   Musculoskeletal: Negative for myalgias and neck pain.   Neurological: Positive for weakness.   Psychiatric/Behavioral: Negative for depression.      Physical Exam  Laboratory/Imaging   Hemodynamics  Temp (24hrs), Av.7 °C (98.1 °F), Min:36.2 °C (97.1 °F), Max:37.6 °C (99.6 °F)   Temperature: 36.2 °C (97.1 °F)  Pulse  Av.5  Min: 68  Max: 85 Heart Rate (Monitored): 79  Blood Pressure : 151/78, NIBP: 148/85      Respiratory      Respiration: 18, Pulse Oximetry: 94 %, O2 Daily Delivery Respiratory : Room Air with O2 Available             Fluids    Intake/Output Summary (Last 24 hours) at 18 1507  Last data filed at 18 0100   Gross per 24 hour   Intake              261 ml   Output                0 ml   Net              261 ml       Nutrition  Orders Placed This Encounter   Procedures   • DIET ORDER     Standing Status:   Standing     Number of Occurrences:   1     Order Specific Question:   Diet:     Answer:   Regular [1]     Physical Exam   Constitutional: She is oriented to person, place, and time. She appears well-developed.   HENT:   Head: Normocephalic and atraumatic.   Mouth/Throat: Oropharynx is  clear and moist.   Eyes: Conjunctivae are normal. Pupils are equal, round, and reactive to light.   Neck: Normal range of motion. Neck supple. No JVD present.   Cardiovascular: Normal rate.    Irregular    Pulmonary/Chest: Effort normal and breath sounds normal.   Abdominal: Soft. Bowel sounds are normal. She exhibits no distension. There is no tenderness. There is no rebound and no guarding.   Musculoskeletal: Normal range of motion. She exhibits no edema or deformity.   Lymphadenopathy:     She has no cervical adenopathy.   Neurological: She is alert and oriented to person, place, and time. She exhibits normal muscle tone.   Skin: Skin is warm and dry. No erythema.   Psychiatric: She has a normal mood and affect.       Recent Labs      03/08/18   1042  03/08/18   1609   03/08/18   2315  03/09/18   0458  03/09/18   1133   WBC  5.8  7.8   --    --   4.7*   --    RBC  4.32  4.19*   --    --   3.55*   --    HEMOGLOBIN  14.5  14.1   < >  11.9*  11.9*  13.6   HEMATOCRIT  43.5  40.9   --    --   35.2*   --    MCV  100.7*  97.6   --    --   99.2*   --    MCH  33.6*  33.7*   --    --   33.5*   --    MCHC  33.3*  34.5   --    --   33.8   --    RDW  46.8  45.1   --    --   45.5   --    PLATELETCT  146*  151*   --    --   122*   --    MPV  9.9  9.4   --    --   9.7   --     < > = values in this interval not displayed.     Recent Labs      03/08/18   1042  03/08/18   1609  03/09/18   0458   SODIUM  143  140  141   POTASSIUM  4.5  3.8  3.6   CHLORIDE  110  113*  113*   CO2  27  20  25   GLUCOSE  97  86  89   BUN  21  25*  16   CREATININE  0.84  0.74  0.69   CALCIUM  9.6  9.3  8.1*     Recent Labs      03/08/18   1042  03/08/18   1609  03/09/18   0458   APTT   --   64.3*   --    INR  >=10.00*  >=10.00*  1.59*                  Assessment/Plan     * GIB (gastrointestinal bleeding)- (present on admission)   Assessment & Plan    Acute, likely due to supratherpeutic INR, no hematochezia or melena since admission  Serial hemoglobin,  hgb stable  Hold Coumadin, monitor INR        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)- (present on admission)   Assessment & Plan    Hold Coumadin        Supratherapeutic INR- (present on admission)   Assessment & Plan    S/p Vitamin K and FFP on admission  Follow INR, now 1.59        Memory impairment- (present on admission)   Assessment & Plan    Frequent orientation, avoid benzodiazepines and anticholinergics        HTN (hypertension)- (present on admission)   Assessment & Plan    Hold Toprol-XL        Paroxysmal atrial fibrillation (CMS-HCC)- (present on admission)   Assessment & Plan    Tele   Hold Toprol-XL and Coumadin        Dyslipidemia- (present on admission)   Assessment & Plan    Hold Lipitor        likel dc 24 hours  Monitor H/h  Monitor INR  Discussed with patient, family, nursing and SW   ordered  Quality-Core Measures   Reviewed items::  Labs reviewed and Medications reviewed  Montgomery catheter::  No Montgomery

## 2018-03-09 NOTE — CARE PLAN
Problem: Infection  Goal: Will remain free from infection  Outcome: PROGRESSING AS EXPECTED  No signs of symptoms of infection noted, will continue to monitor

## 2018-03-09 NOTE — ED NOTES
Report received from Nisha. FFP completed. No s/s of distress or reaction to FFP. Call light in reach. Attempting to call report.

## 2018-03-09 NOTE — ED NOTES
lyn from Lab called with critical result of INR>10 at 1659. Critical lab result read back to Lyn.  Dr. Reed notified of critical lab result at 1659.  Critical lab result read back by Dr. Reed.

## 2018-03-10 VITALS
WEIGHT: 146.83 LBS | HEIGHT: 61 IN | DIASTOLIC BLOOD PRESSURE: 81 MMHG | OXYGEN SATURATION: 93 % | TEMPERATURE: 97.6 F | BODY MASS INDEX: 27.72 KG/M2 | HEART RATE: 76 BPM | SYSTOLIC BLOOD PRESSURE: 153 MMHG | RESPIRATION RATE: 18 BRPM

## 2018-03-10 PROBLEM — K92.2 GIB (GASTROINTESTINAL BLEEDING): Status: RESOLVED | Noted: 2018-03-08 | Resolved: 2018-03-10

## 2018-03-10 PROBLEM — R79.1 SUPRATHERAPEUTIC INR: Status: RESOLVED | Noted: 2018-03-08 | Resolved: 2018-03-10

## 2018-03-10 LAB
HGB BLD-MCNC: 11.8 G/DL (ref 12–16)
HGB BLD-MCNC: 12.4 G/DL (ref 12–16)
HGB BLD-MCNC: 13.1 G/DL (ref 12–16)
INR PPP: 1.19 (ref 0.87–1.13)
PROTHROMBIN TIME: 15 SEC (ref 12–14.6)

## 2018-03-10 PROCEDURE — 85018 HEMOGLOBIN: CPT | Mod: 91

## 2018-03-10 PROCEDURE — 99239 HOSP IP/OBS DSCHRG MGMT >30: CPT | Performed by: HOSPITALIST

## 2018-03-10 PROCEDURE — 85610 PROTHROMBIN TIME: CPT

## 2018-03-10 RX ORDER — WARFARIN SODIUM 5 MG/1
5 TABLET ORAL DAILY
Qty: 15 TAB | Refills: 3 | Status: SHIPPED | OUTPATIENT
Start: 2018-03-10 | End: 2018-04-16

## 2018-03-10 ASSESSMENT — PATIENT HEALTH QUESTIONNAIRE - PHQ9
SUM OF ALL RESPONSES TO PHQ QUESTIONS 1-9: 0
2. FEELING DOWN, DEPRESSED, IRRITABLE, OR HOPELESS: NOT AT ALL
1. LITTLE INTEREST OR PLEASURE IN DOING THINGS: NOT AT ALL
SUM OF ALL RESPONSES TO PHQ9 QUESTIONS 1 AND 2: 0

## 2018-03-10 ASSESSMENT — PAIN SCALES - GENERAL
PAINLEVEL_OUTOF10: 0
PAINLEVEL_OUTOF10: 0
PAINLEVEL_OUTOF10: 3

## 2018-03-10 NOTE — DISCHARGE INSTRUCTIONS
Discharge Instructions    Discharged to home by car with relative(niece) . Discharged via wheelchair, hospital escort: Yes.  Special equipment needed: Not Applicable    Be sure to schedule a follow-up appointment with your primary care doctor or any specialists as instructed.     Discharge Plan:   Influenza Vaccine Indication: Not indicated: Previously immunized this influenza season and > 8 years of age    I understand that a diet low in cholesterol, fat, and sodium is recommended for good health. Unless I have been given specific instructions below for another diet, I accept this instruction as my diet prescription.   Other diet: as tolerated    Special Instructions: [facility] to follow  Telephone # [phone]    · Is patient discharged on Warfarin / Coumadin?   Yes    You are receiving the drug warfarin. Please understand the importance of monitoring warfarin with scheduled PT/INR blood draws.  Follow-up with the [facility] in one week for INR lab..    IMPORTANT: HOW TO USE THIS INFORMATION:  This is a summary and does NOT have all possible information about this product. This information does not assure that this product is safe, effective, or appropriate for you. This information is not individual medical advice and does not substitute for the advice of your health care professional. Always ask your health care professional for complete information about this product and your specific health needs.      WARFARIN - ORAL (WARF-uh-rin)      COMMON BRAND NAME(S): Coumadin      WARNING:  Warfarin can cause very serious (possibly fatal) bleeding. This is more likely to occur when you first start taking this medication or if you take too much warfarin. To decrease your risk for bleeding, your doctor or other health care provider will monitor you closely and check your lab results (INR test) to make sure you are not taking too much warfarin. Keep all medical and laboratory appointments. Tell your doctor  "right away if you notice any signs of serious bleeding. See also Side Effects section.      USES:  This medication is used to treat blood clots (such as in deep vein thrombosis-DVT or pulmonary embolus-PE) and/or to prevent new clots from forming in your body. Preventing harmful blood clots helps to reduce the risk of a stroke or heart attack. Conditions that increase your risk of developing blood clots include a certain type of irregular heart rhythm (atrial fibrillation), heart valve replacement, recent heart attack, and certain surgeries (such as hip/knee replacement). Warfarin is commonly called a \"blood thinner,\" but the more correct term is \"anticoagulant.\" It helps to keep blood flowing smoothly in your body by decreasing the amount of certain substances (clotting proteins) in your blood.      HOW TO USE:  Read the Medication Guide provided by your pharmacist before you start taking warfarin and each time you get a refill. If you have any questions, ask your doctor or pharmacist. Take this medication by mouth with or without food as directed by your doctor or other health care professional, usually once a day. It is very important to take it exactly as directed. Do not increase the dose, take it more frequently, or stop using it unless directed by your doctor. Dosage is based on your medical condition, laboratory tests (such as INR), and response to treatment. Your doctor or other health care provider will monitor you closely while you are taking this medication to determine the right dose for you. Use this medication regularly to get the most benefit from it. To help you remember, take it at the same time each day. It is important to eat a balanced, consistent diet while taking warfarin. Some foods can affect how warfarin works in your body and may affect your treatment and dose. Avoid sudden large increases or decreases in your intake of foods high in vitamin K (such as broccoli, cauliflower, cabbage, " brussels sprouts, kale, spinach, and other green leafy vegetables, liver, green tea, certain vitamin supplements). If you are trying to lose weight, check with your doctor before you try to go on a diet. Cranberry products may also affect how your warfarin works. Limit the amount of cranberry juice (16 ounces/480 milliliters a day) or other cranberry products you may drink or eat.      SIDE EFFECTS:  Nausea, loss of appetite, or stomach/abdominal pain may occur. If any of these effects persist or worsen, tell your doctor or pharmacist promptly. Remember that your doctor has prescribed this medication because he or she has judged that the benefit to you is greater than the risk of side effects. Many people using this medication do not have serious side effects. This medication can cause serious bleeding if it affects your blood clotting proteins too much (shown by unusually high INR lab results). Even if your doctor stops your medication, this risk of bleeding can continue for up to a week. Tell your doctor right away if you have any signs of serious bleeding, including: unusual pain/swelling/discomfort, unusual/easy bruising, prolonged bleeding from cuts or gums, persistent/frequent nosebleeds, unusually heavy/prolonged menstrual flow, pink/dark urine, coughing up blood, vomit that is bloody or looks like coffee grounds, severe headache, dizziness/fainting, unusual or persistent tiredness/weakness, bloody/black/tarry stools, chest pain, shortness of breath, difficulty swallowing. Tell your doctor right away if any of these unlikely but serious side effects occur: persistent nausea/vomiting, severe stomach/abdominal pain, yellowing eyes/skin. This drug rarely has caused very serious (possibly fatal) problems if its effects lead to small blood clots (usually at the beginning of treatment). This can lead to severe skin/tissue damage that may require surgery or amputation if left untreated. Patients with certain blood  conditions (protein C or S deficiency) may be at greater risk. Get medical help right away if any of these rare but serious side effects occur: painful/red/purplish patches on the skin (such as on the toe, breast, abdomen), change in the amount of urine, vision changes, confusion, slurred speech, weakness on one side of the body. A very serious allergic reaction to this drug is rare. However, get medical help right away if you notice any symptoms of a serious allergic reaction, including: rash, itching/swelling (especially of the face/tongue/throat), severe dizziness, trouble breathing. This is not a complete list of possible side effects. If you notice other effects not listed above, contact your doctor or pharmacist. In the [place] - Call your doctor for medical advice about side effects. You may report side effects to FDA at 3-698-ENS-0766. In [place] - Call your doctor for medical advice about side effects. You may report side effects to [facility] [place] at [phone].      PRECAUTIONS:  Before taking warfarin, tell your doctor or pharmacist if you are allergic to it; or if you have any other allergies. This product may contain inactive ingredients, which can cause allergic reactions or other problems. Talk to your pharmacist for more details. Before using this medication, tell your doctor or pharmacist your medical history, especially of: blood disorders (such as anemia, hemophilia), bleeding problems (such as bleeding of the stomach/intestines, bleeding in the brain), blood vessel disorders (such as aneurysms), recent major injury/surgery, liver disease, alcohol use, mental/mood disorders (including memory problems), frequent falls/injuries. It is important that all your doctors and dentists know that you take warfarin. Before having surgery or any medical/dental procedures, tell your doctor or dentist that you are taking this medication and about all the products you use (including prescription drugs,  nonprescription drugs, and herbal products). Avoid getting injections into the muscles. If you must have an injection into a muscle (for example, a flu shot), it should be given in the arm. This way, it will be easier to check for bleeding and/or apply pressure bandages. This medication may cause stomach bleeding. Daily use of alcohol while using this medicine will increase your risk for stomach bleeding and may also affect how this medication works. Limit or avoid alcoholic beverages. If you have not been eating well, if you have an illness or infection that causes fever, vomiting, or diarrhea for more than 2 days, or if you start using any antibiotic medications, contact your doctor or pharmacist immediately because these conditions can affect how warfarin works. This medication can cause heavy bleeding. To lower the chance of getting cut, bruised, or injured, use great caution with sharp objects like safety razors and nail cutters. Use an electric razor when shaving and a soft toothbrush when brushing your teeth. Avoid activities such as contact sports. If you fall or injure yourself, especially if you hit your head, call your doctor immediately. Your doctor may need to check you. The Food & Drug Administration has stated that generic warfarin products are interchangeable. However, consult your doctor or pharmacist before switching warfarin products. Be careful not to take more than one medication that contains warfarin unless specifically directed by the doctor or health care provider who is monitoring your warfarin treatment. Older adults may be at greater risk for bleeding while using this drug. This medication is not recommended for use during pregnancy because of serious (possibly fatal) harm to an unborn baby. Discuss the use of reliable forms of birth control with your doctor. If you become pregnant or think you may be pregnant, tell your doctor immediately. If you are planning pregnancy, discuss a plan  "for managing your condition with your doctor before you become pregnant. Your doctor may switch the type of medication you use during pregnancy. Very small amounts of this medication may pass into breast milk but is unlikely to harm a nursing infant. Consult your doctor before breast-feeding.      DRUG INTERACTIONS:  Drug interactions may change how your medications work or increase your risk for serious side effects. This document does not contain all possible drug interactions. Keep a list of all the products you use (including prescription/nonprescription drugs and herbal products) and share it with your doctor and pharmacist. Do not start, stop, or change the dosage of any medicines without your doctor's approval. Warfarin interacts with many prescription, nonprescription, vitamin, and herbal products. This includes medications that are applied to the skin or inside the vagina or rectum. The interactions with warfarin usually result in an increase or decrease in the \"blood-thinning\" (anticoagulant) effect. Your doctor or other health care professional should closely monitor you to prevent serious bleeding or clotting problems. While taking warfarin, it is very important to tell your doctor or pharmacist of any changes in medications, vitamins, or herbal products that you are taking. Some products that may interact with this drug include: capecitabine, imatinib, mifepristone. Aspirin, aspirin-like drugs (salicylates), and nonsteroidal anti-inflammatory drugs (NSAIDs such as ibuprofen, naproxen, celecoxib) may have effects similar to warfarin. These drugs may increase the risk of bleeding problems if taken during treatment with warfarin. Carefully check all prescription/nonprescription product labels (including drugs applied to the skin such as pain-relieving creams) since the products may contain NSAIDs or salicylates. Talk to your doctor about using a different medication (such as acetaminophen) to treat " pain/fever. Low-dose aspirin and related drugs (such as clopidogrel, ticlopidine) should be continued if prescribed by your doctor for specific medical reasons such as heart attack or stroke prevention. Consult your doctor or pharmacist for more details. Many herbal products interact with warfarin. Tell your doctor before taking any herbal products, especially bromelains, coenzyme Q10, cranberry, danshen, dong quai, fenugreek, garlic, ginkgo biloba, ginseng, and Veronique's wort, among others. This medication may interfere with a certain laboratory test to measure theophylline levels, possibly causing false test results. Make sure laboratory personnel and all your doctors know you use this drug.      OVERDOSE:  If overdose is suspected, contact a poison control center or emergency room immediately. [place] residents can call the [facility] Poison Hotline at 1-466.232.5623. [place] residents can call a provincial poison control center. Symptoms of overdose may include: bloody/black/tarry stools, pink/dark urine, unusual/prolonged bleeding.      NOTES:  Do not share this medication with others. Laboratory and/or medical tests (such as INR, complete blood count) must be performed periodically to monitor your progress or check for side effects. Consult your doctor for more details.      MISSED DOSE:  For the best possible benefit, do not miss any doses. If you do miss a dose and remember on the same day, take it as soon as you remember. If you remember on the next day, skip the missed dose and resume your usual dosing schedule. Do not double the dose to catch up because this could increase your risk for bleeding. Keep a record of missed doses to give to your doctor or pharmacist. Contact your doctor or pharmacist if you miss 2 or more doses in a row.      STORAGE:  Store at room temperature away from light and moisture. Do not store in the bathroom. Keep all medications away from children and pets. Do not flush medications  down the toilet or pour them into a drain unless instructed to do so. Properly discard this product when it is  or no longer needed. Consult your pharmacist or local waste disposal company for more details about how to safely discard your product.      MEDICAL ALERT:  Your condition and medication can cause complications in a medical emergency. For information about enrolling in MedicAlert, call 1-365.168.2457 ([place]) or 1-705.279.5133 ([place]).      Information last revised 2010 Copyright(c)  [employer]             Depression / Suicide Risk    As you are discharged from this [facility] facility, it is important to learn how to keep safe from harming yourself.    Recognize the warning signs:  · Abrupt changes in personality, positive or negative- including increase in energy   · Giving away possessions  · Change in eating patterns- significant weight changes-  positive or negative  · Change in sleeping patterns- unable to sleep or sleeping all the time   · Unwillingness or inability to communicate  · Depression  · Unusual sadness, discouragement and loneliness  · Talk of wanting to die  · Neglect of personal appearance   · Rebelliousness- reckless behavior  · Withdrawal from people/activities they love  · Confusion- inability to concentrate     If you or a loved one observes any of these behaviors or has concerns about self-harm, here's what you can do:  · Talk about it- your feelings and reasons for harming yourself  · Remove any means that you might use to hurt yourself (examples: pills, rope, extension cords, firearm)  · Get professional help from the community (Mental Health, Substance Abuse, psychological counseling)  · Do not be alone:Call your Safe Contact- someone whom you trust who will be there for you.  · Call your local CRISIS HOTLINE 116-0146 or 527-877-6934  · Call your local Children's Mobile Crisis Response Team [place] (856) 615-6986 or www.[place]  · Call  the toll free National Suicide Prevention Hotlines   · National Suicide Prevention Lifeline 679-263-WLOJ (7313)  · National Hope Line Network 800-SUICIDE (154-6741)    Gastrointestinal Bleeding  Introduction  Gastrointestinal bleeding is bleeding somewhere along the path food travels through the body (digestive tract). This path is anywhere between the mouth and the opening of the butt (anus). You may have blood in your poop (stools) or have black poop. If you throw up (vomit), there may be blood in it.  This condition can be mild, serious, or even life-threatening. If you have a lot of bleeding, you may need to stay in the hospital.  Follow these instructions at home:  · Take over-the-counter and prescription medicines only as told by your doctor.  · Eat foods that have a lot of fiber in them. These foods include whole grains, fruits, and vegetables. You can also try eating 1-3 prunes each day.  · Drink enough fluid to keep your pee (urine) clear or pale yellow.  · Keep all follow-up visits as told by your doctor. This is important.  Contact a doctor if:  · Your symptoms do not get better.  Get help right away if:  · Your bleeding gets worse.  · You feel dizzy or you pass out (faint).  · You feel weak.  · You have very bad cramps in your back or belly (abdomen).  · You pass large clumps of blood (clots) in your poop.  · Your symptoms are getting worse.  This information is not intended to replace advice given to you by your health care provider. Make sure you discuss any questions you have with your health care provider.  Document Released: 09/26/2009 Document Revised: 05/25/2017 Document Reviewed: 06/06/2016  © 2017 [employer]

## 2018-03-10 NOTE — PROGRESS NOTES
Resting with eyes closed. Respirations even and unlabored. No c/o. Repositions self. IV infusing without issue. Call light in reach.     Tele Report:   Sinus Rhythm  HR 72-75  Occ PVC  0.20/ 0.08/ 0.40

## 2018-03-10 NOTE — CARE PLAN
Problem: Safety  Goal: Will remain free from falls  Outcome: PROGRESSING AS EXPECTED  Oriented to room and equipment. Call light instructions given, in reach at all times. Slipper socks in place. Floor dry and uncluttered. Bed locked and in lowest position.     Problem: Knowledge Deficit  Goal: Knowledge of the prescribed therapeutic regimen will improve  Outcome: PROGRESSING AS EXPECTED  Plan of care discussed with patient. Opportunity given for questions. States understanding.

## 2018-03-10 NOTE — DISCHARGE PLANNING
Medical Social Work    Ongoing: This  received report from [facility] that Home Health has been arranged for pt.    IMM provided.    Plan: Pt to d/c home with Carley EATON

## 2018-03-10 NOTE — DISCHARGE SUMMARY
CHIEF COMPLAINT ON ADMISSION  Chief Complaint   Patient presents with   • Bloody Stools     Pt began to have bloody diarrhea this am, 2 episodes, bright red blood. Saw PCP and was sent to ED. Pt takes Coumadin. Denies pain, N/V.        CODE STATUS  Full Code    HPI & HOSPITAL COURSE  This is a 88 y.o. female here with diarrhea and acute lower GI bleed, she is on warfarin for chronic a.fib however has not followed up with INR clinic. Her INR was supratherapeutic on admission at 10. Her warfarin was held. She was given Vit k and FFP to reerse it. Her repeat INR was 1.59. She had no more episodes of diarrhea or GI bleeding. Her vitals were stable. Hemoglobin was stable. She requested discharge and she was going to f/u with her PCP. Patient at this time is medically cleared with resolution of all her symptoms. PT did see patient. She was set up with Hh, PT/OT and INR checks at home and discharged at satisfactory conditions. Patient understood and agreed with the above plan.    The patient met 2-midnight criteria for an inpatient stay at the time of discharge.    Therefore, she is discharged in good and stable condition with close outpatient follow-up.    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    MEDICATIONS ON DISCHARGE    Fabien Linares   Home Medication Instructions YOGESH:[id number]    Printed on:03/10/18 1428   Medication Information                      atorvastatin (LIPITOR) 40 MG Tab  Take 1 Tab by mouth every bedtime. Needs to be seen for further refills. Thank you             metoprolol SR (TOPROL XL) 25 MG TABLET SR 24 HR  Take 0.5 Tabs by mouth every bedtime.             warfarin (COUMADIN) 5 MG Tab  Take 1 Tab by mouth every day.                 DIET  Orders Placed This Encounter   Procedures   • DIET ORDER     Standing Status:   Standing     Number of Occurrences:   1     Order Specific Question:   Diet:     Answer:   Regular [1]       ACTIVITY  As tolerated.  Weight bearing as tolerated      CONSULTATIONS  none    PROCEDURES  none    LABORATORY  Lab Results   Component Value Date/Time    SODIUM 141 03/09/2018 04:58 AM    POTASSIUM 3.6 03/09/2018 04:58 AM    CHLORIDE 113 (H) 03/09/2018 04:58 AM    CO2 25 03/09/2018 04:58 AM    GLUCOSE 89 03/09/2018 04:58 AM    BUN 16 03/09/2018 04:58 AM    CREATININE 0.69 03/09/2018 04:58 AM    CREATININE 0.9 11/30/2005 02:20 PM        Lab Results   Component Value Date/Time    WBC 4.7 (L) 03/09/2018 04:58 AM    HEMOGLOBIN 13.1 03/10/2018 11:06 AM    HEMATOCRIT 35.2 (L) 03/09/2018 04:58 AM    PLATELETCT 122 (L) 03/09/2018 04:58 AM        Total time of the discharge process exceeds 38 minutes

## 2018-03-10 NOTE — PROGRESS NOTES
Refused stool softener, afraid bloody BM may start again. Med held. No c/o. Assist to bed, position for comfort. Respirations even and unlabored. Assessment completed. Call light in reach.

## 2018-03-10 NOTE — PROGRESS NOTES
2041 Patient called for assist. Primary Nurse assisted patient to the BR, washed her hands then back to bed. Fall Protocol in effect. Niece visiting. No c/o's at this time. No distress noted.

## 2018-03-10 NOTE — DISCHARGE PLANNING
Order placed for HH.  Per RN Munira, pt is confused and it would be best to get choice for pt's dtr, Christiana.     SUBHASH contacted Christiana regarding HH choice.  Christiana chose [facility].  SUBHASH faxed choice to [facility] [place].

## 2018-03-10 NOTE — PROGRESS NOTES
Sitting on side of bed. C/o pain at IV site, patent, infusing without issue. Tape adjusted and ice applied with some relief. Reminded patient not to hyperflex wrist as catheter is located at bend in wrist and movement is causing the pain. States understanding. Call light in reach.

## 2018-03-10 NOTE — PROGRESS NOTES
Report received. Care assumed. Up to cardiac chair. Alert and oriented. Respirations even and unlabored. No c/o. IV infusing without issue. Call light in reach.

## 2018-03-10 NOTE — PROGRESS NOTES
Resting on and off. Respirations even and unlabored. No c/o. Occasionally forgets to call for assist to bathroom, bed alarm on for patient safety. Call light in reach.

## 2018-03-10 NOTE — PROGRESS NOTES
Tele Summary    Rhythm : Sinus Rhythm  Ectopy : rare PVC  HR : 65  ME : 0.20  QRS : 0.08  QT : 0.40

## 2018-03-10 NOTE — PROGRESS NOTES
0650 Bedside report received from NOC RN ( Nadege) at the beginning of the shift. Patient's sitting up in bed. IVF patent &infusing. Fall Protocol in effect.    0825 Patient's sitting up in bed. IVF patent & infusing. Dr Markham visited. POC discussed with the patient & nieec who's visiting. Verbalized understanding.    0840 Patient's sitting up in bed, having Breakfast. IVF patent &infusing. Fall Protocol in effect Call light within reach. Reminded patient to call for assist. Assessment completed.. No distress noted. Plan of care reviewed with the patient & niece. Verbalized understanding. Refused Senna (see MAR).

## 2018-03-10 NOTE — CARE PLAN
Problem: Safety  Goal: Will remain free from injury  Outcome: PROGRESSING AS EXPECTED  Safety Precaution in effect. Non skid socks in use. Call light within reach. Reminded patient to call for assist. Hourly rounds in effect. Verbalized understanding.    Problem: Infection  Goal: Will remain free from infection  Outcome: PROGRESSING AS EXPECTED  Implement Standard precaution. Hand washing every encounter & before & after patient care. Verbalized understanding.    Problem: Knowledge Deficit  Goal: Knowledge of disease process/condition, treatment plan, diagnostic tests, and medications will improve  Outcome: PROGRESSING AS EXPECTED  Discussed Plan of care with the patient & Shaquille (jose). Questions answered. Verbalized understanding.

## 2018-03-10 NOTE — PROGRESS NOTES
1424 discharge instructions given to the patient & Shaquille Velez (jose). Verbalized understanding. Patient was discharged with patient's belongings, e prescription Coumadin. Questions answered. Verbalized understanding. Discharged via w/c accompanied by one male CNA & niece (Shaquille Velez) via Private car.

## 2018-03-11 NOTE — DISCHARGE PLANNING
Received choice form from [facility].  Referral sent to [facility] at 0884 on 03-11-18.

## 2018-03-12 ENCOUNTER — ANTICOAGULATION VISIT (OUTPATIENT)
Dept: VASCULAR LAB | Facility: MEDICAL CENTER | Age: 83
End: 2018-03-12
Attending: INTERNAL MEDICINE
Payer: MEDICARE

## 2018-03-12 VITALS — HEART RATE: 66 BPM | DIASTOLIC BLOOD PRESSURE: 64 MMHG | SYSTOLIC BLOOD PRESSURE: 133 MMHG

## 2018-03-12 DIAGNOSIS — I48.91 ATRIAL FIBRILLATION WITH RVR (HCC): ICD-10-CM

## 2018-03-12 LAB
INR BLD: 1.7 (ref 0.9–1.2)
INR PPP: 1.7 (ref 2–3.5)

## 2018-03-12 PROCEDURE — 85610 PROTHROMBIN TIME: CPT

## 2018-03-12 PROCEDURE — 99212 OFFICE O/P EST SF 10 MIN: CPT | Performed by: NURSE PRACTITIONER

## 2018-03-12 NOTE — PROGRESS NOTES
Anticoagulation Summary  As of 3/12/2018    INR goal:   2.0-3.0   TTR:   64.9 % (2.4 y)   Today's INR:   1.7!   Maintenance plan:   5 mg (5 mg x 1) every day   Weekly total:   35 mg   Plan last modified:   HARVINDER Shnie (3/12/2018)   Next INR check:   3/14/2018   Target end date:   Indefinite    Indications    Atrial fibrillation with RVR (CMS-HCC) [I48.91]             Anticoagulation Episode Summary     [facility]   [facility]    Preferred lab:       Send INR reminders to:       Comments:   Please call patient and pt's daughter, Christiana, with dosing instruction [phone]  Additional family contact: Mary Velez [phone] if needed   Patient had LTTR- consider alternative      Anticoagulation Care Providers     Provider Role Specialty Phone number    Ines Maguire M.D. Referring Cardiology [phone]    Joseline Nash M.D. Referring Family Medicine [phone]    [facility] Anticoagulation Services Responsible  [phone]        Anticoagulation Patient Findings      HPI:  Fabien Linares seen in clinic today for follow up on anticoagulation therapy in the presence of AF. Pt hospitalized with GI bleed last week. Given vit k and FFP. Pt hasn't had INR checked in several months due to stress of spouse dying. Denies any medication or diet changes. No current symptoms of bleeding or thrombosis reported. She is currently with [facility] . She restarted warfarin on Saturday upon discharge.    A/P:   INR subtherapeutic. INR up from 1.19 on Saturday. Will decrease weekly regimen to 5 mg daily. BP recorded in vitals.    Follow up appointment on Wednesday with Laram[facility]  - Order faxed.    Next Appointment: Wednesday, March 14, 2018.    Joseline VELAZQUEZ

## 2018-03-13 ENCOUNTER — OFFICE VISIT (OUTPATIENT)
Dept: MEDICAL GROUP | Facility: MEDICAL CENTER | Age: 83
End: 2018-03-13
Payer: MEDICARE

## 2018-03-13 VITALS
HEART RATE: 81 BPM | DIASTOLIC BLOOD PRESSURE: 80 MMHG | HEIGHT: 61 IN | OXYGEN SATURATION: 93 % | WEIGHT: 134 LBS | SYSTOLIC BLOOD PRESSURE: 140 MMHG | TEMPERATURE: 98.4 F | BODY MASS INDEX: 25.3 KG/M2

## 2018-03-13 DIAGNOSIS — K92.2 GASTROINTESTINAL HEMORRHAGE, UNSPECIFIED GASTROINTESTINAL HEMORRHAGE TYPE: ICD-10-CM

## 2018-03-13 DIAGNOSIS — R41.3 MEMORY LOSS: ICD-10-CM

## 2018-03-13 DIAGNOSIS — R79.1 ELEVATED INR (INTERNATIONAL NORMALIZED RATIO): ICD-10-CM

## 2018-03-13 PROCEDURE — 99214 OFFICE O/P EST MOD 30 MIN: CPT | Performed by: NURSE PRACTITIONER

## 2018-03-14 ENCOUNTER — ANTICOAGULATION MONITORING (OUTPATIENT)
Dept: VASCULAR LAB | Facility: MEDICAL CENTER | Age: 83
End: 2018-03-14

## 2018-03-14 DIAGNOSIS — I48.91 ATRIAL FIBRILLATION WITH RVR (HCC): ICD-10-CM

## 2018-03-14 LAB — INR PPP: 2 (ref 2–3.5)

## 2018-03-14 NOTE — PROGRESS NOTES
OP Anticoagulation Service Note    Date: 3/14/2018  Anticoagulation Summary  As of 3/14/2018    INR goal:   2.0-3.0   TTR:   64.7 % (2.4 y)   Today's INR:   2.0   Maintenance plan:   5 mg (5 mg x 1) every day   Weekly total:   35 mg   No change documented:   Brennon Gonzalez, Med Ass't   Plan last modified:   HARVINDER Shine (3/12/2018)   Next INR check:   3/19/2018   Target end date:   Indefinite    Indications    Atrial fibrillation with RVR (CMS-HCC) [I48.91]  GIB (gastrointestinal bleeding) (Resolved) [K92.2]             Anticoagulation Episode Summary     [facility]   [facility]    Preferred lab:       Send INR reminders to:       Comments:   Please call patient and pt's daughter, Christiana, with dosing instruction [phone]  Additional family contact: Mary Velez [phone] if needed   Patient had LTTR- consider alternative      Anticoagulation Care Providers     Provider Role Specialty Phone number    Ines Maguire M.D. Referring Cardiology [phone]    Joseline Nash M.D. Referring Family Medicine [phone]    [facility] Anticoagulation Services Responsible  [phone]        Plan: Spoke to patient. Patient is therapeutic and will remain on the same dose. Patient reports no unusual bleeding or bruising and no changes to medication or diet. Patient is to be checked again in 1 week.    Brennon Gonzalez

## 2018-03-19 ENCOUNTER — ANTICOAGULATION MONITORING (OUTPATIENT)
Dept: VASCULAR LAB | Facility: MEDICAL CENTER | Age: 83
End: 2018-03-19

## 2018-03-19 ENCOUNTER — TELEPHONE (OUTPATIENT)
Dept: RADIOLOGY | Facility: MEDICAL CENTER | Age: 83
End: 2018-03-19

## 2018-03-19 DIAGNOSIS — I48.91 ATRIAL FIBRILLATION WITH RVR (HCC): ICD-10-CM

## 2018-03-19 LAB — INR PPP: 2.5 (ref 2–3.5)

## 2018-03-19 NOTE — PROGRESS NOTES
Anticoagulation Summary  As of 3/19/2018    INR goal:   2.0-3.0   TTR:   64.9 % (2.4 y)   Today's INR:   2.5   Maintenance plan:   5 mg (5 mg x 1) every day   Weekly total:   35 mg   Plan last modified:   HARVINDER Shine (3/12/2018)   Next INR check:   3/23/2018   Target end date:   Indefinite    Indications    Atrial fibrillation with RVR (CMS-HCC) [I48.91]  GIB (gastrointestinal bleeding) (Resolved) [K92.2]             Anticoagulation Episode Summary     [facility]   [facility]    Preferred lab:       Send INR reminders to:       Comments:   Please call patient and pt's daughter, Christiana, with dosing instruction [phone]  Additional family contact: Mary Velez [phone] if needed   Patient had LTTR- consider alternative      Anticoagulation Care Providers     Provider Role Specialty Phone number    Ines Maguire M.D. Referring Cardiology [phone]    Joseline Nash M.D. Referring Family Medicine [phone]    [facility] Anticoagulation Services Responsible  [phone]        Anticoagulation Patient Findings    Spoke with patient to report a therapeutic INR.    Pt instructed to continue with current warfarin dosing regimen, confirms dosing.   Pt denies any s/s of bleeding, bruising, clotting or any changes to diet or medication.    Will follow up in 4 days on Friday, Carley Roper, PharmD

## 2018-03-20 ENCOUNTER — HOSPITAL ENCOUNTER (OUTPATIENT)
Dept: RADIOLOGY | Facility: MEDICAL CENTER | Age: 83
End: 2018-03-20
Attending: NURSE PRACTITIONER
Payer: MEDICARE

## 2018-03-20 DIAGNOSIS — Z85.3 HISTORY OF LEFT BREAST CANCER: ICD-10-CM

## 2018-03-20 DIAGNOSIS — N63.0 BREAST NODULE: ICD-10-CM

## 2018-03-20 PROCEDURE — G0279 TOMOSYNTHESIS, MAMMO: HCPCS

## 2018-03-20 PROCEDURE — 76642 ULTRASOUND BREAST LIMITED: CPT | Mod: RT

## 2018-03-21 ENCOUNTER — TELEPHONE (OUTPATIENT)
Dept: MEDICAL GROUP | Facility: MEDICAL CENTER | Age: 83
End: 2018-03-21

## 2018-03-21 DIAGNOSIS — R41.3 MEMORY LOSS: ICD-10-CM

## 2018-03-21 DIAGNOSIS — F02.80 DEMENTIA ASSOCIATED WITH OTHER UNDERLYING DISEASE WITHOUT BEHAVIORAL DISTURBANCE (HCC): ICD-10-CM
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
## 2018-03-23 ENCOUNTER — ANTICOAGULATION MONITORING (OUTPATIENT)
Dept: VASCULAR LAB | Facility: MEDICAL CENTER | Age: 83
End: 2018-03-23

## 2018-03-23 DIAGNOSIS — I48.91 ATRIAL FIBRILLATION WITH RVR (HCC): ICD-10-CM

## 2018-03-23 LAB — INR PPP: 4.2 (ref 2–3.5)

## 2018-03-26 ENCOUNTER — ANTICOAGULATION MONITORING (OUTPATIENT)
Dept: VASCULAR LAB | Facility: MEDICAL CENTER | Age: 83
End: 2018-03-26

## 2018-03-26 DIAGNOSIS — I48.91 ATRIAL FIBRILLATION WITH RVR (HCC): ICD-10-CM

## 2018-03-26 LAB — INR PPP: 1.7 (ref 2–3.5)

## 2018-03-26 NOTE — PROGRESS NOTES
Anticoagulation Summary  As of 3/26/2018    INR goal:   2.0-3.0   TTR:   64.7 % (2.4 y)   Today's INR:   1.7!   Maintenance plan:   2.5 mg (5 mg x 0.5) every day   Weekly total:   17.5 mg   Plan last modified:   Mitchell Hernandez, PharmD (3/26/2018)   Next INR check:   3/28/2018   Target end date:   Indefinite    Indications    Atrial fibrillation with RVR (CMS-HCC) [I48.91]  GIB (gastrointestinal bleeding) (Resolved) [K92.2]             Anticoagulation Episode Summary     [facility]   [facility]    Preferred lab:       Send INR reminders to:       Comments:   Please call patient and pt's daughter, Christiana, with dosing instruction [phone]  Additional family contact: Mary Velez [phone] if needed         Anticoagulation Care Providers     Provider Role Specialty Phone number    Ines Maguire M.D. Referring Cardiology [phone]    Joseline Nash M.D. Referring Family Medicine [phone]    [facility] Anticoagulation Services Responsible  [phone]        Anticoagulation Patient Findings    Spoke to patient's daughter on phone. Instructed to have patient take 5 mg x 1 then 2.5 mg daily. Follow up INR on Wednesday via Carleyfreida Hernandez, PharmD

## 2018-03-28 ENCOUNTER — ANTICOAGULATION MONITORING (OUTPATIENT)
Dept: VASCULAR LAB | Facility: MEDICAL CENTER | Age: 83
End: 2018-03-28

## 2018-03-28 DIAGNOSIS — I48.91 ATRIAL FIBRILLATION WITH RVR (HCC): ICD-10-CM

## 2018-03-28 LAB — INR PPP: 1.4 (ref 2–3.5)

## 2018-03-28 NOTE — PROGRESS NOTES
Anticoagulation Summary  As of 3/28/2018    INR goal:   2.0-3.0   TTR:   64.5 % (2.4 y)   Today's INR:   1.4!   Maintenance plan:   2.5 mg (5 mg x 0.5) every day   Weekly total:   17.5 mg   Plan last modified:   Mitchell Hernandez, PharmD (3/26/2018)   Next INR check:   3/30/2018   Target end date:   Indefinite    Indications    Atrial fibrillation with RVR (CMS-HCC) [I48.91]  GIB (gastrointestinal bleeding) (Resolved) [K92.2]             Anticoagulation Episode Summary     [facility]   [facility]    Preferred lab:       Send INR reminders to:       Comments:   Please call patient and pt's daughter, Christiana, with dosing instruction [phone]  Additional family contact: Mary Velez [phone] if needed         Anticoagulation Care Providers     Provider Role Specialty Phone number    Ines Maguire M.D. Referring Cardiology [phone]    Joseline Nash M.D. Referring Family Medicine [phone]    [facility] Anticoagulation Services Responsible  [phone]        Anticoagulation Patient Findings  Patient Findings     Negatives:   Signs/symptoms of thrombosis, Signs/symptoms of bleeding, Laboratory test error suspected, Change in health, Change in alcohol use, Change in activity, Upcoming invasive procedure, Emergency department visit, Upcoming dental procedure, Missed doses, Extra doses, Change in medications, Change in diet/appetite, Hospital admission, Bruising, Other complaints        Spoke with patients daughter Christiana today regarding subtherapeutic INR of 1.4.  Patient denies any signs/symptoms of bruising or bleeding or any changes in diet and medications.  Instructed patient to call clinic with any questions or concerns.  Instructed patient to take 5mg daily until next INR.  Follow up in 2 days, to reduce risk of adverse events related to this high risk medication,  Warfarin.    Solitario Fitzgerald, PharmD

## 2018-03-30 ENCOUNTER — ANTICOAGULATION MONITORING (OUTPATIENT)
Dept: VASCULAR LAB | Facility: MEDICAL CENTER | Age: 83
End: 2018-03-30

## 2018-03-30 DIAGNOSIS — I48.91 ATRIAL FIBRILLATION WITH RVR (HCC): ICD-10-CM

## 2018-03-30 LAB — INR PPP: 1.5 (ref 2–3.5)

## 2018-03-30 NOTE — PROGRESS NOTES
Anticoagulation Summary  As of 3/30/2018    INR goal:   2.0-3.0   TTR:   64.4 % (2.4 y)   Today's INR:   1.5!   Maintenance plan:   2.5 mg (5 mg x 0.5) every day   Weekly total:   17.5 mg   Plan last modified:   Mitchell Hernandez, PharmD (3/26/2018)   Next INR check:   4/2/2018   Target end date:   Indefinite    Indications    Atrial fibrillation with RVR (CMS-HCC) [I48.91]  GIB (gastrointestinal bleeding) (Resolved) [K92.2]             Anticoagulation Episode Summary     [facility]   [facility]    Preferred lab:       Send INR reminders to:       Comments:   Please call patient and pt's daughter, Christiana, with dosing instruction [phone]  Additional family contact: Mary Velez [phone] if needed         Anticoagulation Care Providers     Provider Role Specialty Phone number    Ines Maguire M.D. Referring Cardiology [phone]    Joseline Nash M.D. Referring Family Medicine [phone]    [facility] Anticoagulation Services Responsible  [phone]        Anticoagulation Patient Findings      Spoke with pt's daughter.  INR is SUB therapeutic.   Pt denies any unusual s/s of bleeding, bruising, clotting or any changes to diet or medications. Denies any etoh, cranberries, supplements, or illness.   Pt verifies warfarin weekly dosing.     Will have pt continue with 5mg warfarin daily until INR check on Monday with [facility]AT[facility].      Catie Roper, PharmD

## 2018-04-02 ENCOUNTER — ANTICOAGULATION MONITORING (OUTPATIENT)
Dept: VASCULAR LAB | Facility: MEDICAL CENTER | Age: 83
End: 2018-04-02

## 2018-04-02 DIAGNOSIS — I48.91 ATRIAL FIBRILLATION WITH RVR (HCC): ICD-10-CM

## 2018-04-02 LAB — INR PPP: 2.3 (ref 2–3.5)

## 2018-04-02 NOTE — PROGRESS NOTES
Anticoagulation Summary  As of 4/2/2018    INR goal:   2.0-3.0   TTR:   64.3 % (2.4 y)   Today's INR:   2.3   Maintenance plan:   5 mg (5 mg x 1) on Tue, Thu; 2.5 mg (5 mg x 0.5) all other days   Weekly total:   22.5 mg   Plan last modified:   HARVINDER Shine (4/2/2018)   Next INR check:   4/5/2018   Target end date:   Indefinite    Indications    Atrial fibrillation with RVR (CMS-HCC) [I48.91]  GIB (gastrointestinal bleeding) (Resolved) [K92.2]             Anticoagulation Episode Summary     [facility]   [facility]    Preferred lab:       Send INR reminders to:       Comments:   Please call patient and pt's daughter, Christiana, with dosing instruction [phone]  Additional family contact: Mary Velez [phone] if needed         Anticoagulation Care Providers     Provider Role Specialty Phone number    Ines Maguire M.D. Referring Cardiology [phone]    Joseline Nash M.D. Referring Family Medicine [phone]    [facility] Anticoagulation Services Responsible  [phone]        Anticoagulation Patient Findings  Patient Findings     Negatives:   Signs/symptoms of thrombosis, Signs/symptoms of bleeding, Laboratory test error suspected, Change in health, Change in alcohol use, Change in activity, Upcoming invasive procedure, Emergency department visit, Upcoming dental procedure, Missed doses, Extra doses, Change in medications, Change in diet/appetite, Hospital admission, Bruising, Other complaints           Spoke with patient's daughter by phone. Patient is therapeutic. INR up from 1.5 on Thursday with loading doses. Denies any medication or diet changes. No current symptoms of bleeding or thrombosis reported. Will increase regimen to account for loading doses. Follow up on Thursday.    Next Appointment: Thursday, April 5, 2018 with Carley VELAZQUEZ

## 2018-04-05 ENCOUNTER — ANTICOAGULATION MONITORING (OUTPATIENT)
Dept: VASCULAR LAB | Facility: MEDICAL CENTER | Age: 83
End: 2018-04-05

## 2018-04-05 DIAGNOSIS — I48.91 ATRIAL FIBRILLATION WITH RVR (HCC): ICD-10-CM

## 2018-04-05 LAB — INR PPP: 1.8 (ref 2–3.5)

## 2018-04-05 NOTE — PROGRESS NOTES
Anticoagulation Summary  As of 4/5/2018    INR goal:   2.0-3.0   TTR:   64.3 % (2.4 y)   Today's INR:   1.8!   Maintenance plan:   2.5 mg (5 mg x 0.5) on Mon, Fri; 5 mg (5 mg x 1) all other days   Weekly total:   30 mg   Plan last modified:   Catie Roper, PharmD (4/5/2018)   Next INR check:   4/9/2018   Target end date:   Indefinite    Indications    Atrial fibrillation with RVR (CMS-HCC) [I48.91]  GIB (gastrointestinal bleeding) (Resolved) [K92.2]             Anticoagulation Episode Summary     [facility]   [facility]    Preferred lab:       Send INR reminders to:       Comments:   Please call patient and pt's daughter, Christiana, with dosing instruction [phone]  Additional family contact: Mary Agustin [phone] if needed         Anticoagulation Care Providers     Provider Role Specialty Phone number    Ines Maguire M.D. Referring Cardiology [phone]    Joseline Nash M.D. Referring Family Medicine [phone]    [facility] Anticoagulation Services Responsible  [phone]        Anticoagulation Patient Findings      Spoke with pt's daughter.  INR is SUB therapeutic.   Pt denies any unusual s/s of bleeding, bruising, clotting or any changes to diet or medications. Denies any etoh, cranberries, supplements, or illness.   Pt verifies warfarin weekly dosing.     Will have pt start an increased weekly dose of 5mg daily except 2.5mg on Mon and Fri.    Repeat INR in 4 days with [facility].     Catie Roper, PharmD

## 2018-04-09 ENCOUNTER — ANTICOAGULATION MONITORING (OUTPATIENT)
Dept: VASCULAR LAB | Facility: MEDICAL CENTER | Age: 83
End: 2018-04-09

## 2018-04-09 DIAGNOSIS — I48.91 ATRIAL FIBRILLATION WITH RVR (HCC): ICD-10-CM

## 2018-04-09 LAB — INR PPP: 2.2 (ref 2–3.5)

## 2018-04-09 NOTE — PROGRESS NOTES
.  Anticoagulation Summary  As of 4/9/2018    INR goal:   2.0-3.0   TTR:   64.2 % (2.4 y)   Today's INR:   2.2   Maintenance plan:   2.5 mg (5 mg x 0.5) on Mon, Fri; 5 mg (5 mg x 1) all other days   Weekly total:   30 mg   Plan last modified:   Catie Roper, PharmD (4/5/2018)   Next INR check:   4/12/2018   Target end date:   Indefinite    Indications    Atrial fibrillation with RVR (CMS-HCC) [I48.91]  GIB (gastrointestinal bleeding) (Resolved) [K92.2]             Anticoagulation Episode Summary     [facility]   [facility]    Preferred lab:       Send INR reminders to:       Comments:   Please call patient and pt's daughter, Christiana, with dosing instruction [phone]  Additional family contact: Mary Velez [phone] if needed;  [facility] f [phone]        Anticoagulation Care Providers     Provider Role Specialty Phone number    Ines Maguire M.D. Referring Cardiology [phone]    Joseline Nash M.D. Referring Family Medicine [phone]    [facility] Anticoagulation Services Responsible  [phone]        Anticoagulation Patient Findings      Spoke with patient's daughter to report a therapeutic INR.    Pt instructed to continue with current warfarin dosing regimen, confirms dosing.   Pt denies any s/s of bleeding, bruising, clotting or any changes to diet or medication.    Will follow up in 4 days with [facility] ANGELITO.     Catie Roper, PharmD

## 2018-04-10 ENCOUNTER — TELEPHONE (OUTPATIENT)
Dept: MEDICAL GROUP | Facility: MEDICAL CENTER | Age: 83
End: 2018-04-10

## 2018-04-10 NOTE — TELEPHONE ENCOUNTER
Pt daughter Christiana called and is asking how she can get you to state that the pt needs to do an eye test and driving test before she can renew her license. Pt daughter sent email  On my chart also

## 2018-04-10 NOTE — TELEPHONE ENCOUNTER
See my prior note on this.  i can't make the DMV do anything but i can revoke her license.  That way if she wants to get it back she must pass the tests.  Would she like me to do that?

## 2018-04-11 ENCOUNTER — HOSPITAL ENCOUNTER (OUTPATIENT)
Dept: LAB | Facility: MEDICAL CENTER | Age: 83
End: 2018-04-11
Attending: NURSE PRACTITIONER
Payer: MEDICARE

## 2018-04-11 DIAGNOSIS — K62.5 RECTAL BLEED: ICD-10-CM

## 2018-04-11 DIAGNOSIS — I48.91 ATRIAL FIBRILLATION WITH RVR (HCC): ICD-10-CM

## 2018-04-11 DIAGNOSIS — R79.1 ELEVATED INR (INTERNATIONAL NORMALIZED RATIO): ICD-10-CM

## 2018-04-11 DIAGNOSIS — S20.01XA TRAUMATIC ECCHYMOSIS OF RIGHT FEMALE BREAST, INITIAL ENCOUNTER: ICD-10-CM

## 2018-04-11 DIAGNOSIS — Z79.01 CHRONIC ANTICOAGULATION: ICD-10-CM

## 2018-04-11 DIAGNOSIS — K92.2 GASTROINTESTINAL HEMORRHAGE, UNSPECIFIED GASTROINTESTINAL HEMORRHAGE TYPE: ICD-10-CM

## 2018-04-11 LAB
BASOPHILS # BLD AUTO: 0.8 % (ref 0–1.8)
BASOPHILS # BLD: 0.03 K/UL (ref 0–0.12)
EOSINOPHIL # BLD AUTO: 0.21 K/UL (ref 0–0.51)
EOSINOPHIL NFR BLD: 5.4 % (ref 0–6.9)
ERYTHROCYTE [DISTWIDTH] IN BLOOD BY AUTOMATED COUNT: 47.9 FL (ref 35.9–50)
HCT VFR BLD AUTO: 44.4 % (ref 37–47)
HGB BLD-MCNC: 14.7 G/DL (ref 12–16)
IMM GRANULOCYTES # BLD AUTO: 0.02 K/UL (ref 0–0.11)
IMM GRANULOCYTES NFR BLD AUTO: 0.5 % (ref 0–0.9)
INR PPP: 2.52 (ref 0.87–1.13)
LYMPHOCYTES # BLD AUTO: 1.11 K/UL (ref 1–4.8)
LYMPHOCYTES NFR BLD: 28.6 % (ref 22–41)
MCH RBC QN AUTO: 33.6 PG (ref 27–33)
MCHC RBC AUTO-ENTMCNC: 33.1 G/DL (ref 33.6–35)
MCV RBC AUTO: 101.6 FL (ref 81.4–97.8)
MONOCYTES # BLD AUTO: 0.37 K/UL (ref 0–0.85)
MONOCYTES NFR BLD AUTO: 9.5 % (ref 0–13.4)
NEUTROPHILS # BLD AUTO: 2.14 K/UL (ref 2–7.15)
NEUTROPHILS NFR BLD: 55.2 % (ref 44–72)
NRBC # BLD AUTO: 0 K/UL
NRBC BLD-RTO: 0 /100 WBC
PLATELET # BLD AUTO: 152 K/UL (ref 164–446)
PMV BLD AUTO: 10 FL (ref 9–12.9)
PROTHROMBIN TIME: 26.9 SEC (ref 12–14.6)
RBC # BLD AUTO: 4.37 M/UL (ref 4.2–5.4)
WBC # BLD AUTO: 3.9 K/UL (ref 4.8–10.8)

## 2018-04-11 PROCEDURE — 85025 COMPLETE CBC W/AUTO DIFF WBC: CPT

## 2018-04-11 PROCEDURE — 85610 PROTHROMBIN TIME: CPT

## 2018-04-11 PROCEDURE — 36415 COLL VENOUS BLD VENIPUNCTURE: CPT

## 2018-04-12 ENCOUNTER — ANTICOAGULATION MONITORING (OUTPATIENT)
Dept: VASCULAR LAB | Facility: MEDICAL CENTER | Age: 83
End: 2018-04-12

## 2018-04-12 DIAGNOSIS — I48.91 ATRIAL FIBRILLATION WITH RVR (HCC): ICD-10-CM

## 2018-04-12 LAB — INR PPP: 2.5 (ref 2–3.5)

## 2018-04-12 NOTE — PROGRESS NOTES
Anticoagulation Summary  As of 4/12/2018    INR goal:   2.0-3.0   TTR:   64.3 % (2.4 y)   Today's INR:   2.5   Maintenance plan:   2.5 mg (5 mg x 0.5) on Mon, Fri; 5 mg (5 mg x 1) all other days   Weekly total:   30 mg   Plan last modified:   Catie Roper, PharmD (4/5/2018)   Next INR check:   4/19/2018   Target end date:   Indefinite    Indications    Atrial fibrillation with RVR (CMS-HCC) [I48.91]  GIB (gastrointestinal bleeding) (Resolved) [K92.2]             Anticoagulation Episode Summary     INR check location:   [facility]    Preferred lab:       Send INR reminders to:       Comments:   Please call patient and pt's daughter, Christiana, with dosing instruction [phone]  Additional family contact: Mary Velez [phone] if needed;  PATY  cabrera [phone]        Anticoagulation Care Providers     Provider Role Specialty Phone number    Ines Maguire M.D. Referring Cardiology [phone]    Joseline Nash M.D. Referring Family Medicine [phone]    [facility] Responsible  [phone]        Anticoagulation Patient Findings    HPI:  Fabien Linares, on anticoagulation therapy with warfarin for AF.  Changes to current medical/health status since last appt: none  Denies signs/symptoms of bleeding and/or thrombosis since the last appt.    Denies any interval changes to diet  Denies any interval changes to medications since last appt.   Denies any complications or cost restrictions with current therapy.     A/P   INR  therapeutic.   Pt is to continue with current warfarin dosing regimen.     Next INR in 1 week(s).    Gareth Kamara, PharmD

## 2018-04-16 ENCOUNTER — OFFICE VISIT (OUTPATIENT)
Dept: CARDIOLOGY | Facility: MEDICAL CENTER | Age: 83
End: 2018-04-16
Payer: MEDICARE

## 2018-04-16 VITALS
OXYGEN SATURATION: 94 % | HEIGHT: 61 IN | HEART RATE: 72 BPM | DIASTOLIC BLOOD PRESSURE: 74 MMHG | SYSTOLIC BLOOD PRESSURE: 126 MMHG | WEIGHT: 134 LBS | BODY MASS INDEX: 25.3 KG/M2

## 2018-04-16 DIAGNOSIS — E78.5 DYSLIPIDEMIA: ICD-10-CM

## 2018-04-16 DIAGNOSIS — I48.0 PAROXYSMAL ATRIAL FIBRILLATION (HCC): ICD-10-CM

## 2018-04-16 DIAGNOSIS — D68.32 HEMORRHAGIC DISORDER DUE TO EXTRINSIC CIRCULATING ANTICOAGULANTS (HCC): ICD-10-CM

## 2018-04-16 DIAGNOSIS — Z79.01 CHRONIC ANTICOAGULATION: ICD-10-CM

## 2018-04-16 PROCEDURE — 99214 OFFICE O/P EST MOD 30 MIN: CPT | Performed by: NURSE PRACTITIONER

## 2018-04-16 ASSESSMENT — ENCOUNTER SYMPTOMS
PND: 0
HEADACHES: 0
SHORTNESS OF BREATH: 0
LOSS OF CONSCIOUSNESS: 0
ORTHOPNEA: 0
MYALGIAS: 0
BLOOD IN STOOL: 0
DIZZINESS: 0
BRUISES/BLEEDS EASILY: 0
ABDOMINAL PAIN: 0
PALPITATIONS: 0
CHILLS: 0
FEVER: 0

## 2018-04-16 NOTE — PROGRESS NOTES
Chief Complaint   Patient presents with   • Hospital Follow-up   • GI Bleeding   • Atrial Fibrillation   • Anticoagulation       Subjective:   Fabien Linares is a 88 y.o. female who presents today for hospital follow-up for GI bleed.    Fabien is an 88 year old female with history of paroxysmal atrial fibrillation, rate controlled with Toprol and anticoagulated with Coumadin, along with hyperlipidemia (treated), previously followed by Dr. Maguire, and last seen in October 2017.    In March 2018, she developed rectal bleeding, and was hospitalized at [facility]. Her INR was supratherapeutic at 10, and she was given vitamin K and FFP. Bleeding stopped, and she remained stable.  She was discharged home in stable condition.    She is here today with her daughter, who lives out of town. Patient currently had home health, to help with medication compliance. Daughter would like to discuss changing from Coumadin to DOAC, given compliance issues. Fabien had previously been on Xarelto, but did not tolerate well, due to GI distress. She is wondering if Eliquis might be an option.  Physically, Fabien is doing well: no further bleeding; no chest pain, pressure or discomfort; no palpitations; breathing is stable with no orthopnea or PND; no dizziness or syncope; no edema. BP has been stable.  Fabien is still somewhat depressed, as her  passed away last year.    Past Medical History:   Diagnosis Date   • Bell's palsy    • Chronic anticoagulation    • Colon polyps    • Diarrhea of infectious origin    • GERD (gastroesophageal reflux disease)    • GI bleed 03/2018    Secondary to supratherapeutic INR, resolved with correction.   • HTN (hypertension)    • Hyperlipidemia    • Memory impairment    • Osteoporosis, unspecified    • Paroxysmal atrial fibrillation (CMS-[facility]) 01/2014    Echocardiogram with normal LV size, severe LVH, LVEF 70-75%. Trace MR, mild TR. RVSP 23mmHg.     Past Surgical History:   Procedure Laterality  Date   • APPENDECTOMY     • INGUINAL HERNIA REPAIR      Hernia Repair, Inguinal   • LUMPECTOMY     • OTHER ORTHOPEDIC SURGERY      foot surgery bilat   • VAGINAL HYSTERECTOMY TOTAL      Hysterectomy,Total Vaginal     Family History   Problem Relation Age of Onset   • Cancer Sister      Social History     Social History   • Marital status:      Spouse name: N/A   • Number of children: N/A   • Years of education: N/A     Occupational History   • Not on file.     Social History Main Topics   • Smoking status: Never Smoker   • Smokeless tobacco: Never Used   • Alcohol use 0.0 oz/week      Comment: occ wine   • Drug use: No   • Sexual activity: Not on file     Other Topics Concern   • Not on file     Social History Narrative   • No narrative on file     Allergies   Allergen Reactions   • Actonel [Risedronate Sodium]      Leg pain   • Amoxicillin    • Morphine Sulfate      Patient became A Fib   • Naproxen      unknown rxn; doctor said not to take   • Vicodin [Hydrocodone-Acetaminophen]      Unknown rxn; doctor said not to take     Outpatient Encounter Prescriptions as of 4/16/2018   Medication Sig Dispense Refill   • apixaban (ELIQUIS) 2.5mg Tab Take 1 Tab by mouth 2 Times a Day. 60 Tab 6   • metoprolol SR (TOPROL XL) 25 MG TABLET SR 24 HR Take 0.5 Tabs by mouth every bedtime. 45 Tab 3   • atorvastatin (LIPITOR) 40 MG Tab Take 1 Tab by mouth every bedtime. Needs to be seen for further refills. Thank you 90 Tab 3   • [DISCONTINUED] warfarin (COUMADIN) 5 MG Tab Take 1 Tab by mouth every day. 15 Tab 3     No facility-administered encounter medications on file as of 4/16/2018.      Review of Systems   Constitutional: Negative for chills and fever.   Respiratory: Negative for shortness of breath.    Cardiovascular: Negative for chest pain, palpitations, orthopnea, leg swelling and PND.   Gastrointestinal: Negative for abdominal pain, blood in stool and melena.   Musculoskeletal: Negative for myalgias.   Neurological:  "Negative for dizziness, loss of consciousness and headaches.   Endo/Heme/Allergies: Does not bruise/bleed easily.        Objective:   /74   Pulse 72   Ht 1.549 m (5' 1\")   Wt 60.8 kg (134 lb)   SpO2 94%   BMI 25.32 kg/m²     Physical Exam   Constitutional: She is oriented to person, place, and time. She appears well-developed and well-nourished.   HENT:   Head: Normocephalic.   Eyes: EOM are normal.   Neck: Normal range of motion. Neck supple. No JVD present.   Cardiovascular: Normal rate and regular rhythm.    Murmur heard.   Systolic murmur is present with a grade of 2/6   Murmur in LLSB.   Pulmonary/Chest: Effort normal and breath sounds normal. No respiratory distress. She has no wheezes. She has no rales.   Abdominal: Soft. Bowel sounds are normal. She exhibits no distension. There is no tenderness.   Musculoskeletal: Normal range of motion. She exhibits no edema.   Neurological: She is alert and oriented to person, place, and time.   Skin: Skin is warm and dry. No rash noted.   Psychiatric: She has a normal mood and affect.     Lab Results   Component Value Date/Time    WBC 3.9 (L) 04/11/2018 08:23 AM    RBC 4.37 04/11/2018 08:23 AM    HEMOGLOBIN 14.7 04/11/2018 08:23 AM    HEMATOCRIT 44.4 04/11/2018 08:23 AM    .6 (H) 04/11/2018 08:23 AM    MCH 33.6 (H) 04/11/2018 08:23 AM    MCHC 33.1 (L) 04/11/2018 08:23 AM    MPV 10.0 04/11/2018 08:23 AM      Lab Results   Component Value Date/Time    SODIUM 141 03/09/2018 04:58 AM    POTASSIUM 3.6 03/09/2018 04:58 AM    CHLORIDE 113 (H) 03/09/2018 04:58 AM    CO2 25 03/09/2018 04:58 AM    GLUCOSE 89 03/09/2018 04:58 AM    BUN 16 03/09/2018 04:58 AM    CREATININE 0.69 03/09/2018 04:58 AM    CREATININE 0.9 11/30/2005 02:20 PM       Assessment:     1. 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)     2. Paroxysmal atrial fibrillation (CMS-HCC)  apixaban (ELIQUIS) 2.5mg Tab   3. Chronic anticoagulation  apixaban (ELIQUIS) 2.5mg " Tab   4. Dyslipidemia         Medical Decision Making:  Today's Assessment / Status / Plan:     1. Rectal bleeding due to elevated INR.  Discussed pros and cons of Coumadin versus DOAC. We will try Eliquis 2.5mg BID; she is to hold Coumadin x 3 days, and then start Eliquis.    2. Paroxysmal atrial fibrillation, in sinus rhythm on Toprol.    3. Hyperlipidemia, treated.    Plan as above.  To FU with Dr. Moon in 3-6 months to establish care; FU sooner with me if clinical condition changes. She does see her PCP tomorrow.    Collaborating MD: Halima

## 2018-04-16 NOTE — LETTER
[facility]   [address]   [address]  [place] [address]  Phone: [phone]  Fax: [phone]              Fabien Linares  5/27/1929    Encounter Date: 4/16/2018    HARVINDER Hartley          PROGRESS NOTE:  Chief Complaint   Patient presents with   • Atrial Fibrillation       Subjective:   Fabien Linares is a 88 y.o. female who presents today for hospital follow-up for GI bleed.    Fabien is an 88 year old female with history of paroxysmal atrial fibrillation, rate controlled with Toprol and anticoagulated with Coumadin, along with hyperlipidemia (treated), previously followed by Dr. Maguire, and last seen in October 2017.    In March 2018, she developed rectal bleeding, and was hs    Past Medical History:   Diagnosis Date   • Bell's palsy    • Chronic anticoagulation    • Colon polyps    • Diarrhea of infectious origin    • GERD (gastroesophageal reflux disease)    • GI bleed 03/2018    Secondary to supratherapeutic INR, resolved with correction.   • HTN (hypertension)    • Hyperlipidemia    • Memory impairment    • Osteoporosis, unspecified    • Paroxysmal atrial fibrillation (CMS-HCC) 01/2014    Echocardiogram with normal LV size, severe LVH, LVEF 70-75%. Trace MR, mild TR. RVSP 23mmHg.     Past Surgical History:   Procedure Laterality Date   • APPENDECTOMY     • INGUINAL HERNIA REPAIR      Hernia Repair, Inguinal   • LUMPECTOMY     • OTHER ORTHOPEDIC SURGERY      foot surgery bilat   • VAGINAL HYSTERECTOMY TOTAL      Hysterectomy,Total Vaginal     Family History   Problem Relation Age of Onset   • Cancer Sister      Social History     Social History   • Marital status:      Spouse name: N/A   • Number of children: N/A   • Years of education: N/A     Occupational History   • Not on file.     Social History Main Topics   • Smoking status: Never Smoker   • Smokeless tobacco: Never Used   • Alcohol use 0.0 oz/week    "Comment: occ wine   • Drug use: No   • Sexual activity: Not on file     Other Topics Concern   • Not on file     Social History Narrative   • No narrative on file     Allergies   Allergen Reactions   • Actonel [Risedronate Sodium]      Leg pain   • Amoxicillin    • Morphine Sulfate      Patient became A Fib   • Naproxen      unknown rxn; doctor said not to take   • Vicodin [Hydrocodone-Acetaminophen]      Unknown rxn; doctor said not to take     Outpatient Encounter Prescriptions as of 4/16/2018   Medication Sig Dispense Refill   • apixaban (ELIQUIS) 2.5mg Tab Take 1 Tab by mouth 2 Times a Day. 60 Tab 6   • metoprolol SR (TOPROL XL) 25 MG TABLET SR 24 HR Take 0.5 Tabs by mouth every bedtime. 45 Tab 3   • atorvastatin (LIPITOR) 40 MG Tab Take 1 Tab by mouth every bedtime. Needs to be seen for further refills. Thank you 90 Tab 3   • [DISCONTINUED] warfarin (COUMADIN) 5 MG Tab Take 1 Tab by mouth every day. 15 Tab 3     No facility-administered encounter medications on file as of 4/16/2018.      ROS     Objective:   /74   Pulse 72   Ht 1.549 m (5' 1\")   Wt 60.8 kg (134 lb)   SpO2 94%   BMI 25.32 kg/m²      Physical Exam    Assessment:     1. 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)     2. Paroxysmal atrial fibrillation (CMS-HCC)  apixaban (ELIQUIS) 2.5mg Tab   3. Chronic anticoagulation  apixaban (ELIQUIS) 2.5mg Tab   4. Dyslipidemia         Medical Decision Making:  Today's Assessment / Status / Plan:            No Recipients              "

## 2018-04-17 ENCOUNTER — APPOINTMENT (RX ONLY)
Dept: URBAN - METROPOLITAN AREA CLINIC 20 | Facility: CLINIC | Age: 83
Setting detail: DERMATOLOGY
End: 2018-04-17

## 2018-04-17 DIAGNOSIS — L72.3 SEBACEOUS CYST: ICD-10-CM

## 2018-04-17 PROBLEM — E78.5 HYPERLIPIDEMIA, UNSPECIFIED: Status: ACTIVE | Noted: 2018-04-17

## 2018-04-17 PROBLEM — I10 ESSENTIAL (PRIMARY) HYPERTENSION: Status: ACTIVE | Noted: 2018-04-17

## 2018-04-17 PROBLEM — K21.9 GASTRO-ESOPHAGEAL REFLUX DISEASE WITHOUT ESOPHAGITIS: Status: ACTIVE | Noted: 2018-04-17

## 2018-04-17 PROCEDURE — ? COUNSELING

## 2018-04-17 PROCEDURE — ? ADDITIONAL NOTES

## 2018-04-17 PROCEDURE — 99202 OFFICE O/P NEW SF 15 MIN: CPT

## 2018-04-17 ASSESSMENT — LOCATION DETAILED DESCRIPTION DERM: LOCATION DETAILED: POSTERIOR MID-PARIETAL SCALP

## 2018-04-17 ASSESSMENT — LOCATION SIMPLE DESCRIPTION DERM: LOCATION SIMPLE: POSTERIOR SCALP

## 2018-04-17 ASSESSMENT — LOCATION ZONE DERM: LOCATION ZONE: SCALP

## 2018-04-17 NOTE — PROCEDURE: ADDITIONAL NOTES
Additional Notes: -discussed s/s of infection or reoccurring inflammation, if those occur consider EXC

## 2018-04-18 ENCOUNTER — OFFICE VISIT (OUTPATIENT)
Dept: MEDICAL GROUP | Facility: MEDICAL CENTER | Age: 83
End: 2018-04-18
Payer: MEDICARE

## 2018-04-18 VITALS
HEART RATE: 65 BPM | SYSTOLIC BLOOD PRESSURE: 142 MMHG | BODY MASS INDEX: 25.11 KG/M2 | HEIGHT: 61 IN | WEIGHT: 133 LBS | OXYGEN SATURATION: 97 % | DIASTOLIC BLOOD PRESSURE: 80 MMHG | TEMPERATURE: 98.7 F

## 2018-04-18 DIAGNOSIS — F02.80 DEMENTIA ASSOCIATED WITH OTHER UNDERLYING DISEASE WITHOUT BEHAVIORAL DISTURBANCE (HCC): ICD-10-CM

## 2018-04-18 DIAGNOSIS — R41.3 MEMORY LOSS: ICD-10-CM

## 2018-04-18 PROCEDURE — 99214 OFFICE O/P EST MOD 30 MIN: CPT | Performed by: NURSE PRACTITIONER

## 2018-04-19 NOTE — PROGRESS NOTES
Subjective:     Fabien Linares is a 88 y.o. female who presents with dementia.    HPI:   Seen in f/u for dementia.  She is seen with her daughter who has POA.  They saw Shy Marcelo yesterday.  Pt is changing to eliquis from coumadin.  Daughter is managing meds.  She also provides transportation.    Due to pts c ontinued and worsening memory loss i exp to pt and daughter that she needs referral to OT for driving eval.  i am also going to fax DMV FORM for license revokation to [facility].  Only if she passes the OT eval can she go to [facility] and take formal written and driving test.  Pt verbalized understanding.  Pt feels that she is still safely able to live in her house alone despite memory loss.    Daughter is going to f/u tomorrow to redo the POLST.   She needs niece addded to list of people who can make decisions for pt if she goes into hospital.    We reviewed the geriatric evaluation.  Will order lab, MRI brain and neuropsychiatry eval as they recommended.    Patient Active Problem List    Diagnosis Date Noted   • Chronic anticoagulation 04/16/2018     Priority: High   • Paroxysmal atrial fibrillation (CMS-HCC) 03/08/2018     Priority: High   • HTN (hypertension) 03/08/2018     Priority: High   • Hemorrhagic disorder due to extrinsic circulating anticoagulants (CMS-[facility]) 03/08/2018     Priority: High   • Dyslipidemia 10/20/2017     Priority: High   • Memory impairment 03/08/2018     Priority: Medium   • GERD (gastroesophageal reflux disease)      Priority: Medium   • Diarrhea of presumed infectious origin 06/14/2017   • Preventative health care 03/05/2014   • Bell's palsy    • Osteoporosis    • Colon polyps        Current medicines (including changes today)  Current Outpatient Prescriptions   Medication Sig Dispense Refill   • apixaban (ELIQUIS) 2.5mg Tab Take 1 Tab by mouth 2 Times a Day. 60 Tab 6   • metoprolol SR (TOPROL XL) 25 MG TABLET SR 24 HR Take 0.5 Tabs by mouth every bedtime. 45 Tab 3   • atorvastatin  "(LIPITOR) 40 MG Tab Take 1 Tab by mouth every bedtime. Needs to be seen for further refills. Thank you 90 Tab 3     No current facility-administered medications for this visit.        Allergies   Allergen Reactions   • Actonel [Risedronate Sodium]      Leg pain   • Amoxicillin    • Morphine Sulfate      Patient became A Fib   • Naproxen      unknown rxn; doctor said not to take   • Vicodin [Hydrocodone-Acetaminophen]      Unknown rxn; doctor said not to take       ROS  Constitutional: Negative. Negative for fever, chills, weight loss, malaise/fatigue and diaphoresis.   HENT: Negative. Negative for hearing loss, ear pain, nosebleeds, congestion, sore throat, neck pain, tinnitus and ear discharge.   Respiratory: Negative. Negative for cough, hemoptysis, sputum production, shortness of breath, wheezing and stridor.   Cardiovascular: Negative. Negative for chest pain, palpitations, orthopnea, claudication, leg swelling and PND.   Gastrointestinal: Denies nausea, vomiting, diarrhea, constipation, heartburn, melena or hematochezia.  Genitourinary: Denies dysuria, hematuria, urinary incontinence, frequency or urgency.        Objective:     Blood pressure 142/80, pulse 65, temperature 37.1 °C (98.7 °F), height 1.549 m (5' 1\"), weight 60.3 kg (133 lb), SpO2 97 %. Body mass index is 25.13 kg/m².    Physical Exam:  Vitals reviewed.  Constitutional:  appears well-developed and well-nourished. No distress.   Cardiovascular: Normal rate, regular rhythm, normal heart sounds and intact distal pulses. Exam reveals no gallop and no friction rub. No murmur heard. No carotid bruits.   Pulmonary/Chest: Effort normal and breath sounds normal. No stridor. No respiratory distress. no wheezes or rales. exhibits no tenderness.   Musculoskeletal: Normal range of motion. exhibits no edema. bridgette pedal pulses 2+.  Lymphadenopathy: No cervical or supraclavicular adenopathy.   Neurological:  exhibits normal muscle tone.  Skin: Skin is warm and dry. " No diaphoresis.   Psychiatric: Normal mood and affect. Behavior is normal.      Assessment and Plan:     The following treatment plan was discussed:    1. Dementia associated with other underlying disease without behavioral disturbance  REFERRAL TO OCCUPATIONAL THERAPY Reason for Therapy: Eval/Treat/Report    MR-BRAIN-WITH & W/O    REFERRAL TO PSYCHIATRY    TSH    VITAMIN B12    FOLATE    RPR TITER/CONFIRM [facility]-TP ([place])    do lab, MRI brain and neuropsych eval.  then f/u for review.  refer OT for driving assessment.   2. Memory loss  REFERRAL TO OCCUPATIONAL THERAPY Reason for Therapy: Eval/Treat/Report    MR-BRAIN-WITH & W/O    REFERRAL TO PSYCHIATRY    TSH    VITAMIN B12    FOLATE    RPR TITER/CONFIRM MHA-TP (AR)         Followup: Return in about 1 day (around 4/19/2018).

## 2018-04-30 ENCOUNTER — TELEPHONE (OUTPATIENT)
Dept: MEDICAL GROUP | Facility: MEDICAL CENTER | Age: 83
End: 2018-04-30

## 2018-04-30 NOTE — TELEPHONE ENCOUNTER
Phone Number Called: [phone] (home)     Message: Christiana informed via detailed voicemessage.     Left Message for patient to call back: yes

## 2018-04-30 NOTE — TELEPHONE ENCOUNTER
Pt's daughter called regarding DMV form. Calling back to get more information:     Phone Number Called: [phone] (home)     Message: Caller would like to know if the Physician certificate has been signed/submit to DMV  (requirements for pt to have 's license)      Left Message for patient to call back: no

## 2018-05-09 ENCOUNTER — ANTICOAGULATION MONITORING (OUTPATIENT)
Dept: VASCULAR LAB | Facility: MEDICAL CENTER | Age: 83
End: 2018-05-09

## 2018-05-09 NOTE — PROGRESS NOTES
Per chart review, pt was switched from warfarin to Eliquis 2.5mg BID on 4/16/18.  Labs wnl.    Catie Roper, PharmD

## 2018-06-05 ENCOUNTER — TELEPHONE (OUTPATIENT)
Dept: MEDICAL GROUP | Facility: MEDICAL CENTER | Age: 83
End: 2018-06-05

## 2018-06-06 ENCOUNTER — OFFICE VISIT (OUTPATIENT)
Dept: MEDICAL GROUP | Facility: MEDICAL CENTER | Age: 83
End: 2018-06-06
Payer: MEDICARE

## 2018-06-06 ENCOUNTER — HOSPITAL ENCOUNTER (OUTPATIENT)
Facility: MEDICAL CENTER | Age: 83
End: 2018-06-06
Attending: NURSE PRACTITIONER
Payer: MEDICARE

## 2018-06-06 VITALS
WEIGHT: 132 LBS | DIASTOLIC BLOOD PRESSURE: 80 MMHG | TEMPERATURE: 99.1 F | OXYGEN SATURATION: 92 % | BODY MASS INDEX: 24.92 KG/M2 | HEIGHT: 61 IN | SYSTOLIC BLOOD PRESSURE: 140 MMHG | HEART RATE: 77 BPM

## 2018-06-06 DIAGNOSIS — R10.2 VAGINAL PAIN: ICD-10-CM

## 2018-06-06 PROCEDURE — 99214 OFFICE O/P EST MOD 30 MIN: CPT | Performed by: NURSE PRACTITIONER

## 2018-06-06 PROCEDURE — 87510 GARDNER VAG DNA DIR PROBE: CPT

## 2018-06-06 PROCEDURE — 81002 URINALYSIS NONAUTO W/O SCOPE: CPT | Performed by: NURSE PRACTITIONER

## 2018-06-06 PROCEDURE — 87480 CANDIDA DNA DIR PROBE: CPT

## 2018-06-06 PROCEDURE — 87660 TRICHOMONAS VAGIN DIR PROBE: CPT

## 2018-06-06 NOTE — PROGRESS NOTES
Subjective:     Fabien Linares is a 89 y.o. female who presents with RLQ ABD PAIN.    HPI:   Seen in f/u for carmen/vaginal pain.  She had a left breast bruise.  This occurred several months ago after a fall.  The bruise has resolved.  No pain.  No further tx needed.    She is having occas carmen/vaginal pain.  She cannot remember any blood in stool or black tarry stools.  It is occurring daily.   Will occurrent intermittently.  No pain with bowel or bladder.  No correlation with food.  nontender. No further falls.  occas burning but not with voiding.  Pt is a poor historian d/t dementia.  Last time she remembers it hurting was 1 weeks ago.      Patient Active Problem List    Diagnosis Date Noted   • Chronic anticoagulation 04/16/2018     Priority: High   • Paroxysmal atrial fibrillation (HCC) 03/08/2018     Priority: High   • HTN (hypertension) 03/08/2018     Priority: High   • Hemorrhagic disorder due to extrinsic circulating anticoagulants (HCC) 03/08/2018     Priority: High   • Dyslipidemia 10/20/2017     Priority: High   • Memory impairment 03/08/2018     Priority: Medium   • GERD (gastroesophageal reflux disease)      Priority: Medium   • Diarrhea of presumed infectious origin 06/14/2017   • Preventative health care 03/05/2014   • Bell's palsy    • Osteoporosis    • Colon polyps        Current medicines (including changes today)  Current Outpatient Prescriptions   Medication Sig Dispense Refill   • apixaban (ELIQUIS) 2.5mg Tab Take 1 Tab by mouth 2 Times a Day. 60 Tab 6   • metoprolol SR (TOPROL XL) 25 MG TABLET SR 24 HR Take 0.5 Tabs by mouth every bedtime. 45 Tab 3   • atorvastatin (LIPITOR) 40 MG Tab Take 1 Tab by mouth every bedtime. Needs to be seen for further refills. Thank you 90 Tab 3     No current facility-administered medications for this visit.        Allergies   Allergen Reactions   • Actonel [Risedronate Sodium]      Leg pain   • Amoxicillin    • Morphine Sulfate      Patient became A Fib   •  "Naproxen      unknown rxn; doctor said not to take   • Vicodin [Hydrocodone-Acetaminophen]      Unknown rxn; doctor said not to take       ROS  Constitutional: Negative. Negative for fever, chills, weight loss, malaise/fatigue and diaphoresis.   HENT: Negative. Negative for hearing loss, ear pain, nosebleeds, congestion, sore throat, neck pain, tinnitus and ear discharge.   Respiratory: Negative. Negative for cough, hemoptysis, sputum production, shortness of breath, wheezing and stridor.   Cardiovascular: Negative. Negative for chest pain, palpitations, orthopnea, claudication, leg swelling and PND.   Gastrointestinal: Denies nausea, vomiting, diarrhea, constipation, heartburn, melena or hematochezia.  Genitourinary: Denies dysuria, hematuria, urinary incontinence, frequency or urgency.        Objective:     Blood pressure 140/80, pulse 77, temperature 37.3 °C (99.1 °F), height 1.549 m (5' 1\"), weight 59.9 kg (132 lb), SpO2 92 %, not currently breastfeeding. Body mass index is 24.94 kg/m².    Physical Exam:  Physical Exam   Vitals reviewed.  Constitutional: oriented to person, place, and time. appears well-developed and well-nourished. No distress.   HENT:  Head: Normocephalic and atraumatic. Right Ear: External ear normal. Left Ear: External ear normal. Nose: Nose normal. Mouth/Throat: Oropharynx is clear and moist. No oropharyngeal exudate.  bridgette tm wnl.  Eyes: Right eye exhibits no discharge. Left eye exhibits no discharge. No scleral icterus.  Neck: No JVD present.  Cardiovascular: Normal rate, regular rhythm, normal heart sounds and intact distal pulses.  Exam reveals no gallop and no friction rub.  No murmur heard.  No carotid bruits.   Pulmonary/Chest: Effort normal and breath sounds normal. No stridor. No respiratory distress. no wheezes or rales. exhibits no tenderness.   Musculoskeletal: Normal range of motion. exhibits no edema. bridgette pedal pulses 2+.  Lymphadenopathy: no cervical or supraclavicular " adenopathy.   Abd:  No CVAT,  Soft,  Bs noted in all quadrants.  No HSM.  No abdominal tenderness.  Vaginal exam done externally.  No rash or dg noted.  No swelling or reddness.  Affirm obtained.    Neurological: alert and oriented to person, place, and time. exhibits normal muscle tone. Coordination normal.   Skin: Skin is warm and dry. no diaphoresis.   Psychiatric: normal mood and affect. behavior is normal.   UA in office wnl.       Assessment and Plan:         1. Vaginal pain  VAGINAL PATHOGENS DNA PANEL    affirm done.  UA in office does not show UTI.  will f/u with pt with affirm results.  if neg will f/u for vag exam.          Followup: Return in about 4 weeks (around 7/4/2018).

## 2018-06-07 ENCOUNTER — TELEPHONE (OUTPATIENT)
Dept: MEDICAL GROUP | Facility: MEDICAL CENTER | Age: 83
End: 2018-06-07

## 2018-06-07 LAB
AMBIGUOUS DTTM AMBI4: NORMAL
APPEARANCE UR: CLEAR
BILIRUB UR STRIP-MCNC: NORMAL MG/DL
CANDIDA DNA VAG QL PROBE+SIG AMP: NEGATIVE
COLOR UR AUTO: YELLOW
G VAGINALIS DNA VAG QL PROBE+SIG AMP: NEGATIVE
GLUCOSE UR STRIP.AUTO-MCNC: NORMAL MG/DL
KETONES UR STRIP.AUTO-MCNC: NORMAL MG/DL
LEUKOCYTE ESTERASE UR QL STRIP.AUTO: NORMAL
NITRITE UR QL STRIP.AUTO: NORMAL
PH UR STRIP.AUTO: 6 [PH] (ref 5–8)
PROT UR QL STRIP: NORMAL MG/DL
RBC UR QL AUTO: NORMAL
SIGNIFICANT IND 70042: NORMAL
SITE SITE: NORMAL
SOURCE SOURCE: NORMAL
SP GR UR STRIP.AUTO: 1.02
T VAGINALIS DNA VAG QL PROBE+SIG AMP: NEGATIVE
UROBILINOGEN UR STRIP-MCNC: NORMAL MG/DL

## 2018-06-07 NOTE — TELEPHONE ENCOUNTER
Please let pt know that the affirm is wnl.  If she is still having the sx i would like to do a vag us.  Let me know if pt and caregiver are ok with that.

## 2018-08-13 DIAGNOSIS — I48.91 ATRIAL FIBRILLATION WITH RVR (HCC): ICD-10-CM

## 2018-08-13 RX ORDER — METOPROLOL SUCCINATE 25 MG/1
12.5 TABLET, EXTENDED RELEASE ORAL
Qty: 45 TAB | Refills: 3 | Status: SHIPPED | OUTPATIENT
Start: 2018-08-13

## 2018-09-04 ENCOUNTER — TELEPHONE (OUTPATIENT)
Dept: CARDIOLOGY | Facility: MEDICAL CENTER | Age: 83
End: 2018-09-04

## 2018-09-04 NOTE — TELEPHONE ENCOUNTER
----- Message from Angelica Son sent at 9/4/2018 10:39 AM PDT -----  Regarding: progressed dementia  Contact: [phone]  AB/guilherme    Pt's daughter & medical POA Christiana Lara calling to discuss pt's progressed dementia with AB.  Unfortunately, Christiana missed getting Fabien to  appt.      Prior to rescheduling, Christiana wishes to discuss the progression of dementia and wants to make sure pt is going to be scheduled with most appropriate cardiologist here.    Please call Christiana at .

## 2018-09-04 NOTE — TELEPHONE ENCOUNTER
S/w daughter & advised she just make another appt. with Dr. Moon to take over cardiology care. She agrees & will call to schedule.

## 2018-10-01 ENCOUNTER — TELEPHONE (OUTPATIENT)
Dept: MEDICAL GROUP | Facility: MEDICAL CENTER | Age: 83
End: 2018-10-01
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
## 2018-10-03 DIAGNOSIS — E78.5 DYSLIPIDEMIA: ICD-10-CM

## 2018-10-03 RX ORDER — ATORVASTATIN CALCIUM 40 MG/1
40 TABLET, FILM COATED ORAL
Qty: 90 TAB | Refills: 1 | Status: CANCELLED | OUTPATIENT
Start: 2018-10-03

## 2018-10-04 DIAGNOSIS — E78.5 DYSLIPIDEMIA: ICD-10-CM

## 2018-10-04 DIAGNOSIS — Z79.01 CHRONIC ANTICOAGULATION: ICD-10-CM

## 2018-10-04 DIAGNOSIS — Z79.899 HIGH RISK MEDICATION USE: ICD-10-CM

## 2018-10-04 DIAGNOSIS — I48.0 PAROXYSMAL ATRIAL FIBRILLATION (HCC): ICD-10-CM

## 2018-10-04 RX ORDER — ATORVASTATIN CALCIUM 40 MG/1
40 TABLET, FILM COATED ORAL
Qty: 90 TAB | Refills: 0 | Status: SHIPPED | OUTPATIENT
Start: 2018-10-04 | End: 2019-01-21 | Stop reason: SDUPTHER

## 2018-10-23 ENCOUNTER — TELEPHONE (OUTPATIENT)
Dept: CARDIOLOGY | Facility: MEDICAL CENTER | Age: 83
End: 2018-10-23

## 2018-10-23 DIAGNOSIS — Z79.01 CHRONIC ANTICOAGULATION: ICD-10-CM

## 2018-10-23 DIAGNOSIS — I48.0 PAROXYSMAL ATRIAL FIBRILLATION (HCC): ICD-10-CM

## 2018-10-23 RX ORDER — APIXABAN 2.5 MG/1
TABLET, FILM COATED ORAL
Qty: 180 TAB | Refills: 3 | Status: SHIPPED | OUTPATIENT
Start: 2018-10-23 | End: 2018-10-30

## 2018-10-23 NOTE — TELEPHONE ENCOUNTER
----- Message from Angelica Son sent at 10/23/2018  2:31 PM PDT -----  Regarding: dementia is worse  Contact: [phone]  AB/guilherme    Pt's daughter Christiana Lara calling to let AB know pt's dementia is  getting worse.  Christiana would appreciate AB knowing this and putting notes into pt's chart to bring the dementia issues to the attention of LS in preparation for appt on 10/30.    Please call Christiana at .

## 2018-10-23 NOTE — TELEPHONE ENCOUNTER
I left daughter Christiana a detailed message that I would put her message into a telephone call note that Dr. Moon will see at the first time office visit. No need to call back unless she has further concerns.   Copy to Shy Marcelo as well.

## 2018-10-24 ENCOUNTER — HOSPITAL ENCOUNTER (OUTPATIENT)
Dept: LAB | Facility: MEDICAL CENTER | Age: 83
End: 2018-10-24
Attending: NURSE PRACTITIONER
Payer: MEDICARE

## 2018-10-24 DIAGNOSIS — Z79.01 CHRONIC ANTICOAGULATION: ICD-10-CM

## 2018-10-24 DIAGNOSIS — Z79.899 HIGH RISK MEDICATION USE: ICD-10-CM

## 2018-10-24 DIAGNOSIS — I48.0 PAROXYSMAL ATRIAL FIBRILLATION (HCC): ICD-10-CM

## 2018-10-24 DIAGNOSIS — E78.5 DYSLIPIDEMIA: ICD-10-CM

## 2018-10-24 PROBLEM — F03.90 DEMENTIA (HCC): Status: ACTIVE | Noted: 2018-10-24

## 2018-10-24 LAB
ALBUMIN SERPL BCP-MCNC: 4 G/DL (ref 3.2–4.9)
ALBUMIN/GLOB SERPL: 1.8 G/DL
ALP SERPL-CCNC: 61 U/L (ref 30–99)
ALT SERPL-CCNC: 16 U/L (ref 2–50)
ANION GAP SERPL CALC-SCNC: 7 MMOL/L (ref 0–11.9)
AST SERPL-CCNC: 20 U/L (ref 12–45)
BASOPHILS # BLD AUTO: 0.8 % (ref 0–1.8)
BASOPHILS # BLD: 0.04 K/UL (ref 0–0.12)
BILIRUB SERPL-MCNC: 1 MG/DL (ref 0.1–1.5)
BUN SERPL-MCNC: 18 MG/DL (ref 8–22)
CALCIUM SERPL-MCNC: 9.8 MG/DL (ref 8.5–10.5)
CHLORIDE SERPL-SCNC: 107 MMOL/L (ref 96–112)
CHOLEST SERPL-MCNC: 182 MG/DL (ref 100–199)
CO2 SERPL-SCNC: 28 MMOL/L (ref 20–33)
CREAT SERPL-MCNC: 0.9 MG/DL (ref 0.5–1.4)
EOSINOPHIL # BLD AUTO: 0.17 K/UL (ref 0–0.51)
EOSINOPHIL NFR BLD: 3.6 % (ref 0–6.9)
ERYTHROCYTE [DISTWIDTH] IN BLOOD BY AUTOMATED COUNT: 48.3 FL (ref 35.9–50)
FASTING STATUS PATIENT QL REPORTED: NORMAL
GLOBULIN SER CALC-MCNC: 2.2 G/DL (ref 1.9–3.5)
GLUCOSE SERPL-MCNC: 103 MG/DL (ref 65–99)
HCT VFR BLD AUTO: 45.4 % (ref 37–47)
HDLC SERPL-MCNC: 71 MG/DL
HGB BLD-MCNC: 15.2 G/DL (ref 12–16)
IMM GRANULOCYTES # BLD AUTO: 0.02 K/UL (ref 0–0.11)
IMM GRANULOCYTES NFR BLD AUTO: 0.4 % (ref 0–0.9)
LDLC SERPL CALC-MCNC: 91 MG/DL
LYMPHOCYTES # BLD AUTO: 1.53 K/UL (ref 1–4.8)
LYMPHOCYTES NFR BLD: 32.4 % (ref 22–41)
MCH RBC QN AUTO: 34.4 PG (ref 27–33)
MCHC RBC AUTO-ENTMCNC: 33.5 G/DL (ref 33.6–35)
MCV RBC AUTO: 102.7 FL (ref 81.4–97.8)
MONOCYTES # BLD AUTO: 0.51 K/UL (ref 0–0.85)
MONOCYTES NFR BLD AUTO: 10.8 % (ref 0–13.4)
NEUTROPHILS # BLD AUTO: 2.45 K/UL (ref 2–7.15)
NEUTROPHILS NFR BLD: 52 % (ref 44–72)
NRBC # BLD AUTO: 0 K/UL
NRBC BLD-RTO: 0 /100 WBC
PLATELET # BLD AUTO: 171 K/UL (ref 164–446)
PMV BLD AUTO: 9.8 FL (ref 9–12.9)
POTASSIUM SERPL-SCNC: 4 MMOL/L (ref 3.6–5.5)
PROT SERPL-MCNC: 6.2 G/DL (ref 6–8.2)
RBC # BLD AUTO: 4.42 M/UL (ref 4.2–5.4)
SODIUM SERPL-SCNC: 142 MMOL/L (ref 135–145)
TRIGL SERPL-MCNC: 98 MG/DL (ref 0–149)
TSH SERPL DL<=0.005 MIU/L-ACNC: 5.39 UIU/ML (ref 0.38–5.33)
WBC # BLD AUTO: 4.7 K/UL (ref 4.8–10.8)

## 2018-10-24 PROCEDURE — 85025 COMPLETE CBC W/AUTO DIFF WBC: CPT

## 2018-10-24 PROCEDURE — 80053 COMPREHEN METABOLIC PANEL: CPT

## 2018-10-24 PROCEDURE — 84443 ASSAY THYROID STIM HORMONE: CPT

## 2018-10-24 PROCEDURE — 80061 LIPID PANEL: CPT

## 2018-10-24 PROCEDURE — 36415 COLL VENOUS BLD VENIPUNCTURE: CPT

## 2018-10-25 ENCOUNTER — TELEPHONE (OUTPATIENT)
Dept: CARDIOLOGY | Facility: MEDICAL CENTER | Age: 83
End: 2018-10-25

## 2018-10-30 ENCOUNTER — OFFICE VISIT (OUTPATIENT)
Dept: MEDICAL GROUP | Facility: MEDICAL CENTER | Age: 83
End: 2018-10-30
Payer: MEDICARE

## 2018-10-30 ENCOUNTER — OFFICE VISIT (OUTPATIENT)
Dept: CARDIOLOGY | Facility: MEDICAL CENTER | Age: 83
End: 2018-10-30
Payer: MEDICARE

## 2018-10-30 VITALS
BODY MASS INDEX: 24.96 KG/M2 | TEMPERATURE: 99.4 F | SYSTOLIC BLOOD PRESSURE: 132 MMHG | OXYGEN SATURATION: 96 % | HEART RATE: 71 BPM | DIASTOLIC BLOOD PRESSURE: 88 MMHG | HEIGHT: 61 IN | WEIGHT: 132.2 LBS

## 2018-10-30 VITALS
SYSTOLIC BLOOD PRESSURE: 138 MMHG | DIASTOLIC BLOOD PRESSURE: 80 MMHG | HEART RATE: 68 BPM | OXYGEN SATURATION: 98 % | WEIGHT: 130.07 LBS | BODY MASS INDEX: 24.56 KG/M2 | HEIGHT: 61 IN

## 2018-10-30 DIAGNOSIS — E78.5 DYSLIPIDEMIA: ICD-10-CM

## 2018-10-30 DIAGNOSIS — Z79.01 CHRONIC ANTICOAGULATION: ICD-10-CM

## 2018-10-30 DIAGNOSIS — D68.32 HEMORRHAGIC DISORDER DUE TO EXTRINSIC CIRCULATING ANTICOAGULANTS (HCC): ICD-10-CM

## 2018-10-30 DIAGNOSIS — R41.3 MEMORY IMPAIRMENT: ICD-10-CM

## 2018-10-30 DIAGNOSIS — Z79.899 HIGH RISK MEDICATION USE: ICD-10-CM

## 2018-10-30 DIAGNOSIS — Z91.89 DRIVING SAFETY ISSUE: ICD-10-CM

## 2018-10-30 DIAGNOSIS — Z23 NEED FOR SHINGLES VACCINE: ICD-10-CM

## 2018-10-30 DIAGNOSIS — R73.01 IMPAIRED FASTING BLOOD SUGAR: ICD-10-CM

## 2018-10-30 DIAGNOSIS — I48.0 PAROXYSMAL ATRIAL FIBRILLATION (HCC): ICD-10-CM

## 2018-10-30 DIAGNOSIS — R79.89 TSH ELEVATION: ICD-10-CM

## 2018-10-30 PROCEDURE — 99214 OFFICE O/P EST MOD 30 MIN: CPT | Performed by: INTERNAL MEDICINE

## 2018-10-30 PROCEDURE — 99214 OFFICE O/P EST MOD 30 MIN: CPT | Performed by: NURSE PRACTITIONER

## 2018-10-30 RX ORDER — LEVOTHYROXINE SODIUM 0.03 MG/1
25 TABLET ORAL
Qty: 30 TAB | Refills: 2 | Status: SHIPPED | OUTPATIENT
Start: 2018-10-30 | End: 2019-03-29

## 2018-10-30 ASSESSMENT — ENCOUNTER SYMPTOMS
PHOTOPHOBIA: 1
HEARTBURN: 1
WEAKNESS: 1
MEMORY LOSS: 1
BRUISES/BLEEDS EASILY: 1
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
## 2018-10-30 NOTE — LETTER
[facility]   [address]  [place] [address]  Phone: [phone]  Fax: [phone]              Fabien Linares  5/27/1929    Encounter Date: 10/30/2018    Yoly Moon M.D.          PROGRESS NOTE:  Subjective:   Chief Complaint:   Chief Complaint   Patient presents with   • Atrial Fibrillation       Fabien Linares is a 89 y.o. female who returns for follow-up of paroxysmal atrial fibrillation, on dose adjusted apixaban for chads 2 vascular score of 3.   Sh she is requiring very little medication.  She is on metoprolol with borderline control blood pressure today.  She is on primary prevention statin therapy.  Her LDL cholesterol is 91.  She is never had a heart attack or stroke.  INRs were becoming a problem, was in the hospital with rectal bleeding, not clear if she had a blood transfusion.    She is showing signs of dementia.  She is chosen to be DNR/DNI.  She has a history of breast cancer with a mastectomy.  She had prior Bell's palsy on the left side.    DATA REVIEWED by me:  ECG 9/16/2016  Sinus rhythm, rate 71, probable LVH    Echo January 18, 2014  EF 70-75%, was in atrial fibrillation at the time.    CT PE study January 2014  No PE, bilateral lung atelectasis, mild calcified plaque noted      Most recent labs:     10/24/2018 hemoglobin 15.2, platelet 171, sodium 142, potassium 4, creatinine 0.9, LFTs normal, LDL 91, HDL 7182, TSH 5.39    Past Medical History:   Diagnosis Date   • Bell's palsy    • Chronic anticoagulation    • Colon polyps    • Dementia    • Diarrhea of infectious origin    • GERD (gastroesophageal reflux disease)    • GI bleed 03/2018    Secondary to supratherapeutic INR, resolved with correction.   • HTN (hypertension)    • Hyperlipidemia    • Memory impairment    • Osteoporosis, unspecified    • Paroxysmal atrial fibrillation (HCC) 01/2014    Echocardiogram with normal LV size, severe LVH, LVEF 70-75%. Trace MR,  "mild TR. RVSP 23mmHg.     Past Surgical History:   Procedure Laterality Date   • APPENDECTOMY     • INGUINAL HERNIA REPAIR      Hernia Repair, Inguinal   • LUMPECTOMY     • OTHER ORTHOPEDIC SURGERY      foot surgery bilat   • VAGINAL HYSTERECTOMY TOTAL      Hysterectomy,Total Vaginal     Family History   Problem Relation Age of Onset   • Cancer Sister      Social History     Social History   • Marital status:      Spouse name: N/A   • Number of children: N/A   • Years of education: N/A     Occupational History   • Not on file.     Social History Main Topics   • Smoking status: Never Smoker   • Smokeless tobacco: Never Used   • Alcohol use 0.0 oz/week      Comment: occ wine   • Drug use: No   • Sexual activity: Not on file     Other Topics Concern   • Not on file     Social History Narrative   • No narrative on file     Allergies   Allergen Reactions   • Actonel [Risedronate Sodium]      Leg pain   • Amoxicillin    • Morphine Sulfate      Patient became A Fib   • Naproxen      unknown rxn; doctor said not to take   • Vicodin [Hydrocodone-Acetaminophen]      Unknown rxn; doctor said not to take       Current Outpatient Prescriptions   Medication Sig Dispense Refill   • ELIQUIS 2.5 MG Tab TAKE 1 TABLET BY MOUTH TWICE DAILY 180 Tab 3   • atorvastatin (LIPITOR) 40 MG Tab Take 1 Tab by mouth every bedtime. 90 Tab 0   • metoprolol SR (TOPROL XL) 25 MG TABLET SR 24 HR Take 0.5 Tabs by mouth every bedtime. 45 Tab 3     No current facility-administered medications for this visit.        Review of Systems   Constitutional: Positive for malaise/fatigue.   Eyes: Positive for photophobia.   Gastrointestinal: Positive for heartburn.   Neurological: Positive for weakness.   Endo/Heme/Allergies: Bruises/bleeds easily.   Psychiatric/Behavioral: Positive for memory loss.     All others systems reviewed and negative.     Objective:     Blood pressure 138/80, pulse 68, height 1.549 m (5' 1\"), weight 59 kg (130 lb 1.1 oz), SpO2 98 " %, not currently breastfeeding. Body mass index is 24.58 kg/m².    Physical Exam   General: No acute distress. Well nourished.  HEENT: EOM grossly intact, no scleral icterus, no pharyngeal erythema.   Neck:  No JVD, no bruits, trachea midline  CVS: RRR. Normal S1, S2. No M/R/G. No LE edema.  2+ radial pulses, 2+ DP pulses  Resp: CTAB. No wheezing or crackles/rhonchi. Normal respiratory effort.  Abdomen: Soft, NT, no cindi hepatomegaly.  MSK/Ext: No clubbing or cyanosis.  Skin: Warm and dry, no rashes.  Neurological: CN III-XII grossly intact. No focal deficits.   Psych: A&O x 3, appropriate affect, good judgement      Assessment:     1. Paroxysmal atrial fibrillation (HCC)     2. Chronic anticoagulation     3. Dyslipidemia     4. High risk medication use     5. 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 Memory impairment         Medical Decision Making:  Today's Assessment / Status / Plan:     -PAF appears well controlled, on appropriate dose of eliquis for weight <60 kg and age.  -BP ok  -Probably mild dementia, daughter worried about this      Return in about 6 months (around 4/30/2019).    It is my pleasure to participate in the care of Ms. Linares.  Please do not hesitate to contact me with questions or concerns.    Yoly Moon MD, [facility]  Cardiologist [facility]    Please note that this dictation was created using voice recognition software. I have made every reasonable attempt to correct obvious errors, but it is possible there are errors of grammar and possibly content that I did not discover before finalizing the note.      Kerry Lugo, A.P.N.  [id number]  [place] [address]  VIA In Basket

## 2018-10-30 NOTE — PROGRESS NOTES
Subjective:   Chief Complaint:   Chief Complaint   Patient presents with   • Atrial Fibrillation       Fabien Linares is a 89 y.o. female who returns for follow-up of paroxysmal atrial fibrillation, on dose adjusted apixaban for chads 2 vascular score of 3.   Sh she is requiring very little medication.  She is on metoprolol with borderline control blood pressure today.  She is on primary prevention statin therapy.  Her LDL cholesterol is 91.  She is never had a heart attack or stroke.  INRs were becoming a problem, was in the hospital with rectal bleeding, not clear if she had a blood transfusion.    She is showing signs of dementia.  She is chosen to be DNR/DNI.  She has a history of breast cancer with a mastectomy.  She had prior Bell's palsy on the left side.    DATA REVIEWED by me:  ECG 9/16/2016  Sinus rhythm, rate 71, probable LVH    Echo January 18, 2014  EF 70-75%, was in atrial fibrillation at the time.    CT PE study January 2014  No PE, bilateral lung atelectasis, mild calcified plaque noted      Most recent labs:     10/24/2018 hemoglobin 15.2, platelet 171, sodium 142, potassium 4, creatinine 0.9, LFTs normal, LDL 91, HDL 7182, TSH 5.39    Past Medical History:   Diagnosis Date   • Bell's palsy    • Chronic anticoagulation    • Colon polyps    • Dementia    • Diarrhea of infectious origin    • GERD (gastroesophageal reflux disease)    • GI bleed 03/2018    Secondary to supratherapeutic INR, resolved with correction.   • HTN (hypertension)    • Hyperlipidemia    • Memory impairment    • Osteoporosis, unspecified    • Paroxysmal atrial fibrillation (HCC) 01/2014    Echocardiogram with normal LV size, severe LVH, LVEF 70-75%. Trace MR, mild TR. RVSP 23mmHg.     Past Surgical History:   Procedure Laterality Date   • APPENDECTOMY     • INGUINAL HERNIA REPAIR      Hernia Repair, Inguinal   • LUMPECTOMY     • OTHER ORTHOPEDIC SURGERY      foot surgery bilat   • VAGINAL HYSTERECTOMY TOTAL       "Hysterectomy,Total Vaginal     Family History   Problem Relation Age of Onset   • Cancer Sister      Social History     Social History   • Marital status:      Spouse name: N/A   • Number of children: N/A   • Years of education: N/A     Occupational History   • Not on file.     Social History Main Topics   • Smoking status: Never Smoker   • Smokeless tobacco: Never Used   • Alcohol use 0.0 oz/week      Comment: occ wine   • Drug use: No   • Sexual activity: Not on file     Other Topics Concern   • Not on file     Social History Narrative   • No narrative on file     Allergies   Allergen Reactions   • Actonel [Risedronate Sodium]      Leg pain   • Amoxicillin    • Morphine Sulfate      Patient became A Fib   • Naproxen      unknown rxn; doctor said not to take   • Vicodin [Hydrocodone-Acetaminophen]      Unknown rxn; doctor said not to take       Current Outpatient Prescriptions   Medication Sig Dispense Refill   • ELIQUIS 2.5 MG Tab TAKE 1 TABLET BY MOUTH TWICE DAILY 180 Tab 3   • atorvastatin (LIPITOR) 40 MG Tab Take 1 Tab by mouth every bedtime. 90 Tab 0   • metoprolol SR (TOPROL XL) 25 MG TABLET SR 24 HR Take 0.5 Tabs by mouth every bedtime. 45 Tab 3     No current facility-administered medications for this visit.        Review of Systems   Constitutional: Positive for malaise/fatigue.   Eyes: Positive for photophobia.   Gastrointestinal: Positive for heartburn.   Neurological: Positive for weakness.   Endo/Heme/Allergies: Bruises/bleeds easily.   Psychiatric/Behavioral: Positive for memory loss.     All others systems reviewed and negative.     Objective:     Blood pressure 138/80, pulse 68, height 1.549 m (5' 1\"), weight 59 kg (130 lb 1.1 oz), SpO2 98 %, not currently breastfeeding. Body mass index is 24.58 kg/m².    Physical Exam   General: No acute distress. Well nourished.  HEENT: EOM grossly intact, no scleral icterus, no pharyngeal erythema.   Neck:  No JVD, no bruits, trachea midline  CVS: RRR. " Normal S1, S2. No M/R/G. No LE edema.  2+ radial pulses, 2+ DP pulses  Resp: CTAB. No wheezing or crackles/rhonchi. Normal respiratory effort.  Abdomen: Soft, NT, no cindi hepatomegaly.  MSK/Ext: No clubbing or cyanosis.  Skin: Warm and dry, no rashes.  Neurological: CN III-XII grossly intact. No focal deficits.   Psych: A&O x 3, appropriate affect, good judgement      Assessment:     1. Paroxysmal atrial fibrillation (HCC)     2. Chronic anticoagulation     3. Dyslipidemia     4. High risk medication use     5. 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 Memory impairment         Medical Decision Making:  Today's Assessment / Status / Plan:     -PAF appears well controlled, on appropriate dose of eliquis for weight <60 kg and age.  -BP ok  -Probably mild dementia, daughter worried about this      Return in about 6 months (around 4/30/2019).    It is my pleasure to participate in the care of Ms. Linares.  Please do not hesitate to contact me with questions or concerns.    Yoly Moon MD, [facility]  Cardiologist [facility]    Please note that this dictation was created using voice recognition software. I have made every reasonable attempt to correct obvious errors, but it is possible there are errors of grammar and possibly content that I did not discover before finalizing the note.

## 2018-10-31 ENCOUNTER — HOSPITAL ENCOUNTER (OUTPATIENT)
Dept: RADIOLOGY | Facility: MEDICAL CENTER | Age: 83
End: 2018-10-31
Attending: NURSE PRACTITIONER
Payer: MEDICARE

## 2018-10-31 DIAGNOSIS — R41.3 MEMORY LOSS: ICD-10-CM

## 2018-10-31 DIAGNOSIS — F02.80 DEMENTIA ASSOCIATED WITH OTHER UNDERLYING DISEASE WITHOUT BEHAVIORAL DISTURBANCE (HCC): ICD-10-CM

## 2018-10-31 PROCEDURE — 70553 MRI BRAIN STEM W/O & W/DYE: CPT

## 2018-10-31 PROCEDURE — 700117 HCHG RX CONTRAST REV CODE 255: Performed by: NURSE PRACTITIONER

## 2018-10-31 PROCEDURE — A9585 GADOBUTROL INJECTION: HCPCS | Performed by: NURSE PRACTITIONER

## 2018-10-31 RX ORDER — GADOBUTROL 604.72 MG/ML
7.5 INJECTION INTRAVENOUS ONCE
Status: COMPLETED | OUTPATIENT
Start: 2018-10-31 | End: 2018-10-31

## 2018-10-31 RX ADMIN — GADOBUTROL 7.5 ML: 604.72 INJECTION INTRAVENOUS at 18:03

## 2018-11-01 ENCOUNTER — TELEPHONE (OUTPATIENT)
Dept: MEDICAL GROUP | Facility: MEDICAL CENTER | Age: 83
End: 2018-11-01

## 2018-11-01 NOTE — TELEPHONE ENCOUNTER
Please let pt and daughter know that the MRI brain shows more cerebral atrophy/brain cell loss than last one in 2011.  This occurs with age.  No acute findings.

## 2018-11-05 ENCOUNTER — TELEPHONE (OUTPATIENT)
Dept: MEDICAL GROUP | Facility: MEDICAL CENTER | Age: 83
End: 2018-11-05

## 2018-11-06 ENCOUNTER — OFFICE VISIT (OUTPATIENT)
Dept: MEDICAL GROUP | Facility: MEDICAL CENTER | Age: 83
End: 2018-11-06
Payer: MEDICARE

## 2018-11-06 VITALS
WEIGHT: 128 LBS | TEMPERATURE: 97.9 F | SYSTOLIC BLOOD PRESSURE: 134 MMHG | HEIGHT: 61 IN | DIASTOLIC BLOOD PRESSURE: 78 MMHG | OXYGEN SATURATION: 93 % | HEART RATE: 65 BPM | BODY MASS INDEX: 24.17 KG/M2

## 2018-11-06 DIAGNOSIS — F02.818 DEMENTIA ASSOCIATED WITH OTHER UNDERLYING DISEASE WITH BEHAVIORAL DISTURBANCE (HCC): ICD-10-CM

## 2018-11-06 PROCEDURE — 99214 OFFICE O/P EST MOD 30 MIN: CPT | Performed by: NURSE PRACTITIONER

## 2018-11-06 NOTE — TELEPHONE ENCOUNTER
Left message for patient to call the office back regarding scheduling AWV.  Please transfer patient returned call to [phone].

## 2018-11-06 NOTE — PROGRESS NOTES
Subjective:     Fabien Linares is a 89 y.o. female who presents with memory loss.    HPI:   Seen in f/u for memory loss/dementia.  She was seen last week.  She is now here for POLST completion.  Had MRI brain that showed cerebral atrophy only.  Pt has memory loss.  She is seen with her daughter today.  Daughter has both legal and healthcare POA.  POLST was completed.  Pt stated that she didn't want any heroic measures to prolong life.  POLST completed.    Pt is otherwise feeling well.     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 180 Tab 3   • levothyroxine (SYNTHROID) 25 MCG Tab Take 1 Tab by mouth Every morning on an empty stomach. 30 Tab 2   • atorvastatin (LIPITOR) 40 MG Tab Take 1 Tab by mouth every bedtime. 90 Tab 0   • metoprolol SR (TOPROL XL) 25 MG TABLET SR 24 HR Take 0.5 Tabs by mouth every bedtime. 45 Tab 3     No current facility-administered medications for this visit.        Allergies   Allergen Reactions   • Actonel [Risedronate Sodium]      Leg pain   • Amoxicillin    • Morphine Sulfate      Patient became A Fib   • Naproxen       "unknown rxn; doctor said not to take   • Vicodin [Hydrocodone-Acetaminophen]      Unknown rxn; doctor said not to take       ROS  Constitutional: Negative. Negative for fever, chills, weight loss, malaise/fatigue and diaphoresis.   HENT: Negative. Negative for hearing loss, ear pain, nosebleeds, congestion, sore throat, neck pain, tinnitus and ear discharge.   Respiratory: Negative. Negative for cough, hemoptysis, sputum production, shortness of breath, wheezing and stridor.   Cardiovascular: Negative. Negative for chest pain, palpitations, orthopnea, claudication, leg swelling and PND.   Gastrointestinal: Denies nausea, vomiting, diarrhea, constipation, heartburn, melena or hematochezia.  Genitourinary: Denies dysuria, hematuria, urinary incontinence, frequency or urgency.        Objective:     Blood pressure 134/78, pulse 65, temperature 36.6 °C (97.9 °F), height 1.549 m (5' 1\"), weight 58.1 kg (128 lb), SpO2 93 %, not currently breastfeeding. Body mass index is 24.19 kg/m².    Physical Exam:  Vitals reviewed.  Constitutional: appears well-developed and well-nourished. No distress.   Cardiovascular: Normal rate, regular rhythm, normal heart sounds and intact distal pulses. Exam reveals no gallop and no friction rub. No murmur heard. No carotid bruits.   Pulmonary/Chest: Effort normal and breath sounds normal. No stridor. No respiratory distress. no wheezes or rales. exhibits no tenderness.   Musculoskeletal: Normal range of motion. exhibits no edema. bridgette pedal pulses 2+.  Lymphadenopathy: No cervical or supraclavicular adenopathy.   Neurological:exhibits normal muscle tone.  Skin: Skin is warm and dry. No diaphoresis.   Psychiatric: Normal mood and affect. Behavior is normal.      Assessment and Plan:     The following treatment plan was discussed:    1. Dementia associated with other underlying disease with behavioral disturbance      pt seen with daughter.  POLST completed.  daughter will  tomorrow.  " reviewed MRI wiht pt showing cerebral atrophy and  ischemic chgs.          Followup: Return in about 3 months (around 2/6/2019).

## 2018-11-29 ENCOUNTER — ANTICOAGULATION MONITORING (OUTPATIENT)
Dept: VASCULAR LAB | Facility: MEDICAL CENTER | Age: 83
End: 2018-11-29

## 2018-11-29 NOTE — PROGRESS NOTES
Pt completed labs.   wnl for Eliquis 2.5mg BID for Afib (> 81yo, < 60kg)    Repeat labs in 6 months.     Catie Roper, PharmD

## 2018-12-07 ENCOUNTER — OFFICE VISIT (OUTPATIENT)
Dept: MEDICAL GROUP | Facility: MEDICAL CENTER | Age: 83
End: 2018-12-07
Payer: MEDICARE

## 2018-12-07 VITALS
HEIGHT: 61 IN | DIASTOLIC BLOOD PRESSURE: 70 MMHG | HEART RATE: 71 BPM | WEIGHT: 128 LBS | SYSTOLIC BLOOD PRESSURE: 126 MMHG | RESPIRATION RATE: 16 BRPM | OXYGEN SATURATION: 96 % | BODY MASS INDEX: 24.17 KG/M2 | TEMPERATURE: 98.4 F

## 2018-12-07 DIAGNOSIS — E78.5 DYSLIPIDEMIA: ICD-10-CM

## 2018-12-07 DIAGNOSIS — R41.3 MEMORY IMPAIRMENT: ICD-10-CM

## 2018-12-07 DIAGNOSIS — K21.9 GASTROESOPHAGEAL REFLUX DISEASE WITHOUT ESOPHAGITIS: ICD-10-CM

## 2018-12-07 DIAGNOSIS — I48.0 PAROXYSMAL ATRIAL FIBRILLATION (HCC): ICD-10-CM

## 2018-12-07 DIAGNOSIS — M81.0 AGE-RELATED OSTEOPOROSIS WITHOUT CURRENT PATHOLOGICAL FRACTURE: ICD-10-CM

## 2018-12-07 DIAGNOSIS — E03.4 HYPOTHYROIDISM DUE TO ACQUIRED ATROPHY OF THYROID: ICD-10-CM

## 2018-12-07 DIAGNOSIS — Z00.00 HEALTH CARE MAINTENANCE: ICD-10-CM

## 2018-12-07 DIAGNOSIS — I10 ESSENTIAL HYPERTENSION: ICD-10-CM

## 2018-12-07 PROCEDURE — 99204 OFFICE O/P NEW MOD 45 MIN: CPT | Performed by: FAMILY MEDICINE
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
## 2019-01-21 DIAGNOSIS — E78.5 DYSLIPIDEMIA: ICD-10-CM

## 2019-01-21 RX ORDER — ATORVASTATIN CALCIUM 40 MG/1
40 TABLET, FILM COATED ORAL
Qty: 90 TAB | Refills: 3 | Status: SHIPPED | OUTPATIENT
Start: 2019-01-21

## 2019-02-05 ENCOUNTER — APPOINTMENT (RX ONLY)
Dept: URBAN - METROPOLITAN AREA CLINIC 20 | Facility: CLINIC | Age: 84
Setting detail: DERMATOLOGY
End: 2019-02-05

## 2019-02-05 DIAGNOSIS — L82.1 OTHER SEBORRHEIC KERATOSIS: ICD-10-CM

## 2019-02-05 DIAGNOSIS — L57.0 ACTINIC KERATOSIS: ICD-10-CM

## 2019-02-05 PROBLEM — D48.5 NEOPLASM OF UNCERTAIN BEHAVIOR OF SKIN: Status: ACTIVE | Noted: 2019-02-05

## 2019-02-05 PROCEDURE — ? LIQUID NITROGEN

## 2019-02-05 PROCEDURE — 11102 TANGNTL BX SKIN SINGLE LES: CPT

## 2019-02-05 PROCEDURE — ? BIOPSY BY SHAVE METHOD

## 2019-02-05 PROCEDURE — 17000 DESTRUCT PREMALG LESION: CPT | Mod: 59

## 2019-02-05 PROCEDURE — 99213 OFFICE O/P EST LOW 20 MIN: CPT | Mod: 25

## 2019-02-05 PROCEDURE — ? COUNSELING

## 2019-02-05 ASSESSMENT — LOCATION SIMPLE DESCRIPTION DERM
LOCATION SIMPLE: RIGHT FOREARM
LOCATION SIMPLE: RIGHT UPPER BACK

## 2019-02-05 ASSESSMENT — LOCATION DETAILED DESCRIPTION DERM
LOCATION DETAILED: RIGHT MEDIAL UPPER BACK
LOCATION DETAILED: RIGHT PROXIMAL DORSAL FOREARM

## 2019-02-05 ASSESSMENT — LOCATION ZONE DERM
LOCATION ZONE: ARM
LOCATION ZONE: TRUNK

## 2019-02-05 NOTE — PROCEDURE: BIOPSY BY SHAVE METHOD
Additional Anesthesia Volume In Cc (Will Not Render If 0): 0
Wound Care: Bacitracin
Lab Facility: 
Size Of Lesion In Cm: 1.1
Anesthesia Type: 1% lidocaine with 1:100,000 epinephrine and a 1:12 solution of 8.4% sodium bicarbonate
Render Post-Care Instructions In Note?: yes
Destruction After The Procedure: No
Type Of Destruction Used: Curettage
X Size Of Lesion In Cm: 1.2
Billing Type: Third-Party Bill
Biopsy Type: H and E
Depth Of Biopsy: dermis
Dressing: Band-Aid
Post-Care Instructions: I reviewed with the patient in detail post-care instructions. Patient is to keep the biopsy site clean once daily and then apply petroleum and bandaid  until healed.
Anesthesia Volume In Cc: 1
Consent: Written consent was obtained and risks were reviewed including but not limited to scarring, infection, bleeding, scabbing, incomplete removal, nerve damage and allergy to anesthesia.
Lab: [id number]
Detail Level: Detailed
Biopsy Method: Personna blade
Hemostasis: Drysol and Electrocautery
Notification Instructions: Patient will be notified of biopsy results. However, patient instructed to call the office if not contacted within 2 weeks.

## 2019-02-05 NOTE — PROCEDURE: LIQUID NITROGEN
Consent: The patient's consent was obtained including but not limited to risks of crusting, scabbing, blistering, scarring, darker or lighter pigmentary change, recurrence, incomplete removal and infection. RTC in 2 months if lesion(s) persistent.
Number Of Freeze-Thaw Cycles: 2 freeze-thaw cycles
Render Post-Care Instructions In Note?: yes
Detail Level: Detailed
Duration Of Freeze Thaw-Cycle (Seconds): 10
Post-Care Instructions: I reviewed with the patient in detail post-care instructions. Patient is to wear sunprotection, and avoid picking at any of the treated lesions. Pt may apply Vaseline to crusted or scabbing areas.

## 2019-02-05 NOTE — HPI: SKIN LESION
Is This A New Presentation, Or A Follow-Up?: Skin Lesion
What Type Of Note Output Would You Prefer (Optional)?: Standard Output
How Severe Is Your Skin Lesion?: moderate
Has Your Skin Lesion Been Treated?: been treated
Additional History: Patient presents with care giver, Elaina. Pt was treated by Urgent Care with mupirocin without resolution.
When Was It Treated?: 01/07/2019

## 2019-02-19 ENCOUNTER — OFFICE VISIT (OUTPATIENT)
Dept: URGENT CARE | Facility: CLINIC | Age: 84
End: 2019-02-19
Payer: MEDICARE

## 2019-02-19 ENCOUNTER — HOSPITAL ENCOUNTER (OUTPATIENT)
Dept: RADIOLOGY | Facility: MEDICAL CENTER | Age: 84
End: 2019-02-19
Attending: FAMILY MEDICINE
Payer: MEDICARE

## 2019-02-19 VITALS
WEIGHT: 128 LBS | HEIGHT: 61 IN | BODY MASS INDEX: 24.17 KG/M2 | DIASTOLIC BLOOD PRESSURE: 90 MMHG | OXYGEN SATURATION: 91 % | HEART RATE: 76 BPM | RESPIRATION RATE: 18 BRPM | TEMPERATURE: 98.2 F | SYSTOLIC BLOOD PRESSURE: 142 MMHG

## 2019-02-19 DIAGNOSIS — M79.661 RIGHT CALF PAIN: ICD-10-CM

## 2019-02-19 DIAGNOSIS — M79.89 CALF SWELLING: ICD-10-CM

## 2019-02-19 PROCEDURE — 99214 OFFICE O/P EST MOD 30 MIN: CPT | Performed by: FAMILY MEDICINE

## 2019-02-19 PROCEDURE — 93971 EXTREMITY STUDY: CPT | Mod: RT

## 2019-02-19 ASSESSMENT — ENCOUNTER SYMPTOMS
WEIGHT LOSS: 0
EYE DISCHARGE: 0
FOCAL WEAKNESS: 0
SENSORY CHANGE: 0
EYE REDNESS: 0
DIARRHEA: 0

## 2019-02-20 NOTE — PROGRESS NOTES
"Subjective:      Fabien Linares is a 89 y.o. female who presents with Leg Swelling (xtoday, right lower back of leg swelling, lump)            Onset today swelling and mild pain right posterior lateral knee.  Concerned about DVT.  There is some varicosities in the area however smaller.  No trauma.  She has been shoveling some snow.  No chest pain.  No shortness of breath.  No hemoptysis.  No past medical history or family history DVT.  No past medical history of cancer.  No recent immobilization.  No home remedies or over-the-counter treatments tried.  No other aggravating or alleviating factors.        Review of Systems   Constitutional: Negative for malaise/fatigue and weight loss.   Eyes: Negative for discharge and redness.   Cardiovascular: Negative for chest pain.   Gastrointestinal: Negative for diarrhea.   Skin: Negative for itching and rash.   Neurological: Negative for sensory change and focal weakness.      .  Medications, Allergies, and current problem list reviewed today in Epic       Objective:     /90 (BP Location: Left arm, Patient Position: Sitting, BP Cuff Size: Small adult)   Pulse 76   Temp 36.8 °C (98.2 °F) (Temporal)   Resp 18   Ht 1.549 m (5' 1\")   Wt 58.1 kg (128 lb)   SpO2 91%   BMI 24.19 kg/m²      Physical Exam   Constitutional: She is oriented to person, place, and time. She appears well-developed and well-nourished. No distress.   HENT:   Head: Normocephalic.   Eyes: Conjunctivae are normal.   Neck: Neck supple. No JVD present.   Cardiovascular: Normal rate and regular rhythm.    Murmur ( 2/6 systolic) heard.  Pulmonary/Chest: Effort normal and breath sounds normal.   Musculoskeletal:   Right lower extremity.  Focal swelling and induration Proximal calf.  Possible swelling of varicosity.  Mildly tender.  No edema.  Distal neurovascular intact.   Neurological: She is alert and oriented to person, place, and time.   Skin: Skin is warm and dry. No rash noted.             "   Assessment/Plan:   [id number]  Troy Corbin [phone]  1. Calf swelling      2. Right calf pain    - US-EXTREMITY VENOUS LOWER UNILAT RIGHT; Future    We will follow-up ultrasound results.

## 2019-03-15 NOTE — TELEPHONE ENCOUNTER
1. Caller Name: Pt                      Call Back Number: [phone]    2. Message: Pt called stating she has had severe diarrhea and had a black stool this morning. She would like a prescription for this sent to [facility]. Pt was also notified that she needs to follow up with [facility] to make sure she is completing all stool tests that were ordered by [facility].     I also notified pt's daughter that there were stool tests ordered by [facility] that need to be completed.     3. Patient approves office to leave a detailed voicemail/MyChart message: yes      
Pt's daughter notified.   
She needs to be seen for black stool.  GI would be best.  There is not a med to call in for this issue.  She needs to be evaluated.  
WOUNDS

## 2019-03-29 ENCOUNTER — OFFICE VISIT (OUTPATIENT)
Dept: NEUROLOGY | Facility: MEDICAL CENTER | Age: 84
End: 2019-03-29
Payer: MEDICARE

## 2019-03-29 VITALS
BODY MASS INDEX: 24.7 KG/M2 | RESPIRATION RATE: 14 BRPM | HEIGHT: 61 IN | WEIGHT: 130.8 LBS | TEMPERATURE: 98.1 F | OXYGEN SATURATION: 93 % | SYSTOLIC BLOOD PRESSURE: 116 MMHG | HEART RATE: 61 BPM | DIASTOLIC BLOOD PRESSURE: 56 MMHG

## 2019-03-29 DIAGNOSIS — G30.1 LATE ONSET ALZHEIMER'S DISEASE WITH BEHAVIORAL DISTURBANCE (HCC): Primary | ICD-10-CM

## 2019-03-29 DIAGNOSIS — F02.818 LATE ONSET ALZHEIMER'S DISEASE WITH BEHAVIORAL DISTURBANCE (HCC): Primary | ICD-10-CM

## 2019-03-29 PROCEDURE — 99205 OFFICE O/P NEW HI 60 MIN: CPT | Performed by: PSYCHIATRY & NEUROLOGY

## 2019-03-29 RX ORDER — AMLODIPINE BESYLATE 5 MG/1
5 TABLET ORAL EVERY MORNING
COMMUNITY

## 2019-03-29 ASSESSMENT — ENCOUNTER SYMPTOMS
FOCAL WEAKNESS: 0
DEPRESSION: 0
TREMORS: 0
MEMORY LOSS: 1
TINGLING: 1
INSOMNIA: 0
LOSS OF CONSCIOUSNESS: 0
NERVOUS/ANXIOUS: 0

## 2019-03-29 ASSESSMENT — PATIENT HEALTH QUESTIONNAIRE - PHQ9: CLINICAL INTERPRETATION OF PHQ2 SCORE: 0
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
## 2019-04-25 ENCOUNTER — TELEPHONE (OUTPATIENT)
Dept: NEUROLOGY | Facility: MEDICAL CENTER | Age: 84
End: 2019-04-25

## 2019-04-25 DIAGNOSIS — G30.1 LATE ONSET ALZHEIMER'S DISEASE WITH BEHAVIORAL DISTURBANCE (HCC): ICD-10-CM

## 2019-04-25 DIAGNOSIS — F02.818 LATE ONSET ALZHEIMER'S DISEASE WITH BEHAVIORAL DISTURBANCE (HCC): ICD-10-CM

## 2019-04-25 NOTE — LETTER
2019        Fabien Linares  : May 27, 1929    To whom it may concern:    Ms. Linares suffers from a progressive, degenerative disorder resulting in dementia and a loss of competency.  As a result, she has no longer capable of participating in her own personal, medical, and financial affairs.  If there are any questions in regards to this matter, my office can be contacted.    Sincerely:          Kyle Perrin M.D.

## 2019-05-09 ENCOUNTER — TELEPHONE (OUTPATIENT)
Dept: VASCULAR LAB | Facility: MEDICAL CENTER | Age: 84
End: 2019-05-09

## 2019-05-09 ENCOUNTER — ANTICOAGULATION MONITORING (OUTPATIENT)
Dept: VASCULAR LAB | Facility: MEDICAL CENTER | Age: 84
End: 2019-05-09

## 2019-05-09 DIAGNOSIS — I48.0 PAROXYSMAL ATRIAL FIBRILLATION (HCC): ICD-10-CM

## 2019-07-18 ENCOUNTER — APPOINTMENT (RX ONLY)
Dept: URBAN - METROPOLITAN AREA CLINIC 4 | Facility: CLINIC | Age: 84
Setting detail: DERMATOLOGY
End: 2019-07-18

## 2019-07-18 DIAGNOSIS — Z85.828 PERSONAL HISTORY OF OTHER MALIGNANT NEOPLASM OF SKIN: ICD-10-CM

## 2019-07-18 DIAGNOSIS — L72.8 OTHER FOLLICULAR CYSTS OF THE SKIN AND SUBCUTANEOUS TISSUE: ICD-10-CM

## 2019-07-18 DIAGNOSIS — Z71.89 OTHER SPECIFIED COUNSELING: ICD-10-CM

## 2019-07-18 PROCEDURE — ? DIAGNOSIS COMMENT

## 2019-07-18 PROCEDURE — 99213 OFFICE O/P EST LOW 20 MIN: CPT

## 2019-07-18 PROCEDURE — ? COUNSELING

## 2019-07-18 ASSESSMENT — LOCATION SIMPLE DESCRIPTION DERM
LOCATION SIMPLE: RIGHT FOREARM
LOCATION SIMPLE: POSTERIOR SCALP
LOCATION SIMPLE: SCALP
LOCATION SIMPLE: CHEST

## 2019-07-18 ASSESSMENT — LOCATION DETAILED DESCRIPTION DERM
LOCATION DETAILED: LEFT SUPERIOR FRONTAL SCALP
LOCATION DETAILED: RIGHT PROXIMAL DORSAL FOREARM
LOCATION DETAILED: STERNAL NOTCH
LOCATION DETAILED: MID-OCCIPITAL SCALP

## 2019-07-18 ASSESSMENT — LOCATION ZONE DERM
LOCATION ZONE: ARM
LOCATION ZONE: TRUNK
LOCATION ZONE: SCALP

## 2019-07-18 NOTE — PROCEDURE: DIAGNOSIS COMMENT
Detail Level: Detailed
Comment: Biopsy proven regressing Keratoacanthoma. NER upon exam.
Detail Level: Zone
Comment: Asymptomatic per patient and per daughter patient hasn’t complained about lesions, will monitor.

## 2019-12-10 ENCOUNTER — APPOINTMENT (RX ONLY)
Dept: URBAN - METROPOLITAN AREA CLINIC 4 | Facility: CLINIC | Age: 84
Setting detail: DERMATOLOGY
End: 2019-12-10

## 2019-12-10 DIAGNOSIS — D485 NEOPLASM OF UNCERTAIN BEHAVIOR OF SKIN: ICD-10-CM

## 2019-12-10 PROBLEM — D48.5 NEOPLASM OF UNCERTAIN BEHAVIOR OF SKIN: Status: ACTIVE | Noted: 2019-12-10

## 2019-12-10 PROCEDURE — 11424 EXC H-F-NK-SP B9+MARG 3.1-4: CPT

## 2019-12-10 PROCEDURE — 13121 CMPLX RPR S/A/L 2.6-7.5 CM: CPT

## 2019-12-10 PROCEDURE — ? EXCISION

## 2019-12-10 ASSESSMENT — LOCATION DETAILED DESCRIPTION DERM: LOCATION DETAILED: LEFT CENTRAL PARIETAL SCALP

## 2019-12-10 ASSESSMENT — LOCATION ZONE DERM: LOCATION ZONE: SCALP

## 2019-12-10 ASSESSMENT — LOCATION SIMPLE DESCRIPTION DERM: LOCATION SIMPLE: SCALP

## 2019-12-10 NOTE — HPI: PROCEDURE (SKIN SURGERY)
Has The Growth Been Previously Biopsied?: has not been previously biopsied
Body Location Override (Optional): Left superior frontal scalp

## 2019-12-10 NOTE — PROCEDURE: EXCISION
Show Primary And Secondary Defect Sizes Variable (Do Not Hide If You Perform Flaps Or Graft Closures): Yes
Complex Repair And Epidermal Autograft Text: The defect edges were debeveled with a #15 scalpel blade.  The primary defect was closed partially with a complex linear closure.  Given the location of the defect, shape of the defect and the proximity to free margins an epidermal autograft was deemed most appropriate to repair the remaining defect.  The graft was trimmed to fit the size of the remaining defect.  The graft was then placed in the primary defect, oriented appropriately, and sutured into place.
Crescentic Advancement Flap Text: The defect edges were debeveled with a #15 scalpel blade.  Given the location of the defect and the proximity to free margins a crescentic advancement flap was deemed most appropriate.  Using a sterile surgical marker, the appropriate advancement flap was drawn incorporating the defect and placing the expected incisions within the relaxed skin tension lines where possible.    The area thus outlined was incised deep to adipose tissue with a #15 scalpel blade.  The skin margins were undermined to an appropriate distance in all directions utilizing iris scissors.
Bi-Rhombic Flap Text: The defect edges were debeveled with a #15 scalpel blade.  Given the location of the defect and the proximity to free margins a bi-rhombic flap was deemed most appropriate.  Using a sterile surgical marker, an appropriate rhombic flap was drawn incorporating the defect. The area thus outlined was incised deep to adipose tissue with a #15 scalpel blade.  The skin margins were undermined to an appropriate distance in all directions utilizing iris scissors.
Tissue Cultured Epidermal Autograft Text: The defect edges were debeveled with a #15 scalpel blade.  Given the location of the defect, shape of the defect and the proximity to free margins a tissue cultured epidermal autograft was deemed most appropriate.  The graft was then trimmed to fit the size of the defect.  The graft was then placed in the primary defect and oriented appropriately.
Chonodrocutaneous Helical Advancement Flap Text: The defect edges were debeveled with a #15 scalpel blade.  Given the location of the defect and the proximity to free margins a chondrocutaneous helical advancement flap was deemed most appropriate.  Using a sterile surgical marker, the appropriate advancement flap was drawn incorporating the defect and placing the expected incisions within the relaxed skin tension lines where possible.    The area thus outlined was incised deep to adipose tissue with a #15 scalpel blade.  The skin margins were undermined to an appropriate distance in all directions utilizing iris scissors.
Skin Substitute Text: The defect edges were debeveled with a #15 scalpel blade.  Given the location of the defect, shape of the defect and the proximity to free margins a skin substitute graft was deemed most appropriate.  The graft material was trimmed to fit the size of the defect. The graft was then placed in the primary defect and oriented appropriately.
Rhomboid Transposition Flap Text: The defect edges were debeveled with a #15 scalpel blade.  Given the location of the defect and the proximity to free margins a rhomboid transposition flap was deemed most appropriate.  Using a sterile surgical marker, an appropriate rhomboid flap was drawn incorporating the defect.    The area thus outlined was incised deep to adipose tissue with a #15 scalpel blade.  The skin margins were undermined to an appropriate distance in all directions utilizing iris scissors.
Lazy S Complex Repair Preamble Text (Leave Blank If You Do Not Want): Extensive wide undermining was performed.
S Plasty Text: Given the location and shape of the defect, and the orientation of relaxed skin tension lines, an S-plasty was deemed most appropriate for repair.  Using a sterile surgical marker, the appropriate outline of the S-plasty was drawn, incorporating the defect and placing the expected incisions within the relaxed skin tension lines where possible.  The area thus outlined was incised deep to adipose tissue with a #15 scalpel blade.  The skin margins were undermined to an appropriate distance in all directions utilizing iris scissors. The skin flaps were advanced over the defect.  The opposing margins were then approximated with interrupted buried subcutaneous sutures.
Intermediate / Complex Repair - Final Wound Length In Cm: 4.5
Xenograft Text: The defect edges were debeveled with a #15 scalpel blade.  Given the location of the defect, shape of the defect and the proximity to free margins a xenograft was deemed most appropriate.  The graft was then trimmed to fit the size of the defect.  The graft was then placed in the primary defect and oriented appropriately.
Validate Referring Provider (Can Hide Referring Provider In Settings Tab): No
H Plasty Text: Given the location of the defect, shape of the defect and the proximity to free margins a H-plasty was deemed most appropriate for repair.  Using a sterile surgical marker, the appropriate advancement arms of the H-plasty were drawn incorporating the defect and placing the expected incisions within the relaxed skin tension lines where possible. The area thus outlined was incised deep to adipose tissue with a #15 scalpel blade. The skin margins were undermined to an appropriate distance in all directions utilizing iris scissors.  The opposing advancement arms were then advanced into place in opposite direction and anchored with interrupted buried subcutaneous sutures.
Complex Repair And Dermal Autograft Text: The defect edges were debeveled with a #15 scalpel blade.  The primary defect was closed partially with a complex linear closure.  Given the location of the defect, shape of the defect and the proximity to free margins an dermal autograft was deemed most appropriate to repair the remaining defect.  The graft was trimmed to fit the size of the remaining defect.  The graft was then placed in the primary defect, oriented appropriately, and sutured into place.
Helical Rim Advancement Flap Text: The defect edges were debeveled with a #15 blade scalpel.  Given the location of the defect and the proximity to free margins (helical rim) a double helical rim advancement flap was deemed most appropriate.  Using a sterile surgical marker, the appropriate advancement flaps were drawn incorporating the defect and placing the expected incisions between the helical rim and antihelix where possible.  The area thus outlined was incised through and through with a #15 scalpel blade.  With a skin hook and iris scissors, the flaps were gently and sharply undermined and freed up.
Anesthesia Type: 1% lidocaine with 1:100,000 epinephrine and 408mcg clindamycin/ml and a 1:10 solution of 8.4% sodium bicarbonate
Complex Repair And Tissue Cultured Epidermal Autograft Text: The defect edges were debeveled with a #15 scalpel blade.  The primary defect was closed partially with a complex linear closure.  Given the location of the defect, shape of the defect and the proximity to free margins an tissue cultured epidermal autograft was deemed most appropriate to repair the remaining defect.  The graft was trimmed to fit the size of the remaining defect.  The graft was then placed in the primary defect, oriented appropriately, and sutured into place.
V-Y Plasty Text: The defect edges were debeveled with a #15 scalpel blade.  Given the location of the defect, shape of the defect and the proximity to free margins an V-Y advancement flap was deemed most appropriate.  Using a sterile surgical marker, an appropriate advancement flap was drawn incorporating the defect and placing the expected incisions within the relaxed skin tension lines where possible.    The area thus outlined was incised deep to adipose tissue with a #15 scalpel blade.  The skin margins were undermined to an appropriate distance in all directions utilizing iris scissors.
Bilateral Helical Rim Advancement Flap Text: The defect edges were debeveled with a #15 blade scalpel.  Given the location of the defect and the proximity to free margins (helical rim) a bilateral helical rim advancement flap was deemed most appropriate.  Using a sterile surgical marker, the appropriate advancement flaps were drawn incorporating the defect and placing the expected incisions between the helical rim and antihelix where possible.  The area thus outlined was incised through and through with a #15 scalpel blade.  With a skin hook and iris scissors, the flaps were gently and sharply undermined and freed up.
Crescentic Intermediate Repair Preamble Text (Leave Blank If You Do Not Want): Undermining was performed with blunt dissection.
W Plasty Text: The lesion was extirpated to the level of the fat with a #15 scalpel blade.  Given the location of the defect, shape of the defect and the proximity to free margins a W-plasty was deemed most appropriate for repair.  Using a sterile surgical marker, the appropriate transposition arms of the W-plasty were drawn incorporating the defect and placing the expected incisions within the relaxed skin tension lines where possible.    The area thus outlined was incised deep to adipose tissue with a #15 scalpel blade.  The skin margins were undermined to an appropriate distance in all directions utilizing iris scissors.  The opposing transposition arms were then transposed into place in opposite direction and anchored with interrupted buried subcutaneous sutures.
O-T Advancement Flap Text: The defect edges were debeveled with a #15 scalpel blade.  Given the location of the defect, shape of the defect and the proximity to free margins an O-T advancement flap was deemed most appropriate.  Using a sterile surgical marker, an appropriate advancement flap was drawn incorporating the defect and placing the expected incisions within the relaxed skin tension lines where possible.    The area thus outlined was incised deep to adipose tissue with a #15 scalpel blade.  The skin margins were undermined to an appropriate distance in all directions utilizing iris scissors.
A-T Advancement Flap Text: The defect edges were debeveled with a #15 scalpel blade.  Given the location of the defect, shape of the defect and the proximity to free margins an A-T advancement flap was deemed most appropriate.  Using a sterile surgical marker, an appropriate advancement flap was drawn incorporating the defect and placing the expected incisions within the relaxed skin tension lines where possible.    The area thus outlined was incised deep to adipose tissue with a #15 scalpel blade.  The skin margins were undermined to an appropriate distance in all directions utilizing iris scissors.
Complex Repair And Xenograft Text: The defect edges were debeveled with a #15 scalpel blade.  The primary defect was closed partially with a complex linear closure.  Given the location of the defect, shape of the defect and the proximity to free margins a xenograft was deemed most appropriate to repair the remaining defect.  The graft was trimmed to fit the size of the remaining defect.  The graft was then placed in the primary defect, oriented appropriately, and sutured into place.
Purse String (Intermediate) Text: Given the location of the defect and the characteristics of the surrounding skin a purse string intermediate closure was deemed most appropriate.  Undermining was performed circumferentially around the surgical defect.  A purse string suture was then placed and tightened.
O-L Flap Text: The defect edges were debeveled with a #15 scalpel blade.  Given the location of the defect, shape of the defect and the proximity to free margins an O-L flap was deemed most appropriate.  Using a sterile surgical marker, an appropriate advancement flap was drawn incorporating the defect and placing the expected incisions within the relaxed skin tension lines where possible.    The area thus outlined was incised deep to adipose tissue with a #15 scalpel blade.  The skin margins were undermined to an appropriate distance in all directions utilizing iris scissors.
Z Plasty Text: The lesion was extirpated to the level of the fat with a #15 scalpel blade.  Given the location of the defect, shape of the defect and the proximity to free margins a Z-plasty was deemed most appropriate for repair.  Using a sterile surgical marker, the appropriate transposition arms of the Z-plasty were drawn incorporating the defect and placing the expected incisions within the relaxed skin tension lines where possible.    The area thus outlined was incised deep to adipose tissue with a #15 scalpel blade.  The skin margins were undermined to an appropriate distance in all directions utilizing iris scissors.  The opposing transposition arms were then transposed into place in opposite direction and anchored with interrupted buried subcutaneous sutures.
Medical Necessity Information: It is in your best interest to select a reason for this procedure from the list below. All of these items fulfill various CMS LCD requirements except lesion extends to a margin.
Ear Star Wedge Flap Text: The defect edges were debeveled with a #15 blade scalpel.  Given the location of the defect and the proximity to free margins (helical rim) an ear star wedge flap was deemed most appropriate.  Using a sterile surgical marker, the appropriate flap was drawn incorporating the defect and placing the expected incisions between the helical rim and antihelix where possible.  The area thus outlined was incised through and through with a #15 scalpel blade.
Complex Repair And Skin Substitute Graft Text: The defect edges were debeveled with a #15 scalpel blade.  The primary defect was closed partially with a complex linear closure.  Given the location of the remaining defect, shape of the defect and the proximity to free margins a skin substitute graft was deemed most appropriate to repair the remaining defect.  The graft was trimmed to fit the size of the remaining defect.  The graft was then placed in the primary defect, oriented appropriately, and sutured into place.
Purse String (Simple) Text: Given the location of the defect and the characteristics of the surrounding skin a purse string simple closure was deemed most appropriate.  Undermining was performed circumferentially around the surgical defect.  A purse string suture was then placed and tightened.
O-Z Flap Text: The defect edges were debeveled with a #15 scalpel blade.  Given the location of the defect, shape of the defect and the proximity to free margins an O-Z flap was deemed most appropriate.  Using a sterile surgical marker, an appropriate transposition flap was drawn incorporating the defect and placing the expected incisions within the relaxed skin tension lines where possible. The area thus outlined was incised deep to adipose tissue with a #15 scalpel blade.  The skin margins were undermined to an appropriate distance in all directions utilizing iris scissors.
Complex Repair And Double Advancement Flap Text: The defect edges were debeveled with a #15 scalpel blade.  The primary defect was closed partially with a complex linear closure.  Given the location of the remaining defect, shape of the defect and the proximity to free margins a double advancement flap was deemed most appropriate for complete closure of the defect.  Using a sterile surgical marker, an appropriate advancement flap was drawn incorporating the defect and placing the expected incisions within the relaxed skin tension lines where possible.    The area thus outlined was incised deep to adipose tissue with a #15 scalpel blade.  The skin margins were undermined to an appropriate distance in all directions utilizing iris scissors.
Path Notes (To The Dermatopathologist): Please check margins.
Complex Repair And Single Advancement Flap Text: The defect edges were debeveled with a #15 scalpel blade.  The primary defect was closed partially with a complex linear closure.  Given the location of the remaining defect, shape of the defect and the proximity to free margins a single advancement flap was deemed most appropriate for complete closure of the defect.  Using a sterile surgical marker, an appropriate advancement flap was drawn incorporating the defect and placing the expected incisions within the relaxed skin tension lines where possible.    The area thus outlined was incised deep to adipose tissue with a #15 scalpel blade.  The skin margins were undermined to an appropriate distance in all directions utilizing iris scissors.
Cheek Interpolation Flap Text: A decision was made to reconstruct the defect utilizing an interpolation axial flap and a staged reconstruction.  A telfa template was made of the defect.  This telfa template was then used to outline the Cheek Interpolation flap.  The donor area for the pedicle flap was then injected with anesthesia.  The flap was excised through the skin and subcutaneous tissue down to the layer of the underlying musculature.  The interpolation flap was carefully excised within this deep plane to maintain its blood supply.  The edges of the donor site were undermined.   The donor site was closed in a primary fashion.  The pedicle was then rotated into position and sutured.  Once the tube was sutured into place, adequate blood supply was confirmed with blanching and refill.  The pedicle was then wrapped with xeroform gauze and dressed appropriately with a telfa and gauze bandage to ensure continued blood supply and protect the attached pedicle.
Epidermal Closure: simple interrupted
Banner Transposition Flap Text: The defect edges were debeveled with a #15 scalpel blade.  Given the location of the defect and the proximity to free margins a Banner transposition flap was deemed most appropriate.  Using a sterile surgical marker, an appropriate flap drawn around the defect. The area thus outlined was incised deep to adipose tissue with a #15 scalpel blade.  The skin margins were undermined to an appropriate distance in all directions utilizing iris scissors.
Surgeon (Optional): Nereida
Bilobed Transposition Flap Text: The defect edges were debeveled with a #15 scalpel blade.  Given the location of the defect and the proximity to free margins a bilobed transposition flap was deemed most appropriate.  Using a sterile surgical marker, an appropriate bilobe flap drawn around the defect.    The area thus outlined was incised deep to adipose tissue with a #15 scalpel blade.  The skin margins were undermined to an appropriate distance in all directions utilizing iris scissors.
Complex Repair And Modified Advancement Flap Text: The defect edges were debeveled with a #15 scalpel blade.  The primary defect was closed partially with a complex linear closure.  Given the location of the remaining defect, shape of the defect and the proximity to free margins a modified advancement flap was deemed most appropriate for complete closure of the defect.  Using a sterile surgical marker, an appropriate advancement flap was drawn incorporating the defect and placing the expected incisions within the relaxed skin tension lines where possible.    The area thus outlined was incised deep to adipose tissue with a #15 scalpel blade.  The skin margins were undermined to an appropriate distance in all directions utilizing iris scissors.
Cheek-To-Nose Interpolation Flap Text: A decision was made to reconstruct the defect utilizing an interpolation axial flap and a staged reconstruction.  A telfa template was made of the defect.  This telfa template was then used to outline the Cheek-To-Nose Interpolation flap.  The donor area for the pedicle flap was then injected with anesthesia.  The flap was excised through the skin and subcutaneous tissue down to the layer of the underlying musculature.  The interpolation flap was carefully excised within this deep plane to maintain its blood supply.  The edges of the donor site were undermined.   The donor site was closed in a primary fashion.  The pedicle was then rotated into position and sutured.  Once the tube was sutured into place, adequate blood supply was confirmed with blanching and refill.  The pedicle was then wrapped with xeroform gauze and dressed appropriately with a telfa and gauze bandage to ensure continued blood supply and protect the attached pedicle.
Bilobed Flap Text: The defect edges were debeveled with a #15 scalpel blade.  Given the location of the defect and the proximity to free margins a bilobe flap was deemed most appropriate.  Using a sterile surgical marker, an appropriate bilobe flap drawn around the defect.    The area thus outlined was incised deep to adipose tissue with a #15 scalpel blade.  The skin margins were undermined to an appropriate distance in all directions utilizing iris scissors.
Additional Anesthesia Volume In Cc: 6
Double O-Z Flap Text: The defect edges were debeveled with a #15 scalpel blade.  Given the location of the defect, shape of the defect and the proximity to free margins a Double O-Z flap was deemed most appropriate.  Using a sterile surgical marker, an appropriate transposition flap was drawn incorporating the defect and placing the expected incisions within the relaxed skin tension lines where possible. The area thus outlined was incised deep to adipose tissue with a #15 scalpel blade.  The skin margins were undermined to an appropriate distance in all directions utilizing iris scissors.
Complex Repair And A-T Advancement Flap Text: The defect edges were debeveled with a #15 scalpel blade.  The primary defect was closed partially with a complex linear closure.  Given the location of the remaining defect, shape of the defect and the proximity to free margins an A-T advancement flap was deemed most appropriate for complete closure of the defect.  Using a sterile surgical marker, an appropriate advancement flap was drawn incorporating the defect and placing the expected incisions within the relaxed skin tension lines where possible.    The area thus outlined was incised deep to adipose tissue with a #15 scalpel blade.  The skin margins were undermined to an appropriate distance in all directions utilizing iris scissors.
Melolabial Interpolation Flap Text: A decision was made to reconstruct the defect utilizing an interpolation axial flap and a staged reconstruction.  A telfa template was made of the defect.  This telfa template was then used to outline the melolabial interpolation flap.  The donor area for the pedicle flap was then injected with anesthesia.  The flap was excised through the skin and subcutaneous tissue down to the layer of the underlying musculature.  The pedicle flap was carefully excised within this deep plane to maintain its blood supply.  The edges of the donor site were undermined.   The donor site was closed in a primary fashion.  The pedicle was then rotated into position and sutured.  Once the tube was sutured into place, adequate blood supply was confirmed with blanching and refill.  The pedicle was then wrapped with xeroform gauze and dressed appropriately with a telfa and gauze bandage to ensure continued blood supply and protect the attached pedicle.
Trilobed Flap Text: The defect edges were debeveled with a #15 scalpel blade.  Given the location of the defect and the proximity to free margins a trilobed flap was deemed most appropriate.  Using a sterile surgical marker, an appropriate trilobed flap drawn around the defect.    The area thus outlined was incised deep to adipose tissue with a #15 scalpel blade.  The skin margins were undermined to an appropriate distance in all directions utilizing iris scissors.
Eliptical Excision Additional Text (Leave Blank If You Do Not Want): The margin was drawn around the clinically apparent lesion.  An elliptical shape was then drawn on the skin incorporating the lesion and margins.  Incisions were then made along these lines to the appropriate tissue plane and the lesion was extirpated.
Fusiform Excision Additional Text (Leave Blank If You Do Not Want): The margin was drawn around the clinically apparent lesion.  A fusiform shape was then drawn on the skin incorporating the lesion and margins.  Incisions were then made along these lines to the appropriate tissue plane and the lesion was extirpated.
Anesthesia Volume In Cc: 0
V-Y Flap Text: The defect edges were debeveled with a #15 scalpel blade.  Given the location of the defect, shape of the defect and the proximity to free margins a V-Y flap was deemed most appropriate.  Using a sterile surgical marker, an appropriate advancement flap was drawn incorporating the defect and placing the expected incisions within the relaxed skin tension lines where possible.    The area thus outlined was incised deep to adipose tissue with a #15 scalpel blade.  The skin margins were undermined to an appropriate distance in all directions utilizing iris scissors.
Interpolation Flap Text: A decision was made to reconstruct the defect utilizing an interpolation axial flap and a staged reconstruction.  A telfa template was made of the defect.  This telfa template was then used to outline the interpolation flap.  The donor area for the pedicle flap was then injected with anesthesia.  The flap was excised through the skin and subcutaneous tissue down to the layer of the underlying musculature.  The interpolation flap was carefully excised within this deep plane to maintain its blood supply.  The edges of the donor site were undermined.   The donor site was closed in a primary fashion.  The pedicle was then rotated into position and sutured.  Once the tube was sutured into place, adequate blood supply was confirmed with blanching and refill.  The pedicle was then wrapped with xeroform gauze and dressed appropriately with a telfa and gauze bandage to ensure continued blood supply and protect the attached pedicle.
Wound Care: Bacitracin
Mastoid Interpolation Flap Text: A decision was made to reconstruct the defect utilizing an interpolation axial flap and a staged reconstruction.  A telfa template was made of the defect.  This telfa template was then used to outline the mastoid interpolation flap.  The donor area for the pedicle flap was then injected with anesthesia.  The flap was excised through the skin and subcutaneous tissue down to the layer of the underlying musculature.  The pedicle flap was carefully excised within this deep plane to maintain its blood supply.  The edges of the donor site were undermined.   The donor site was closed in a primary fashion.  The pedicle was then rotated into position and sutured.  Once the tube was sutured into place, adequate blood supply was confirmed with blanching and refill.  The pedicle was then wrapped with xeroform gauze and dressed appropriately with a telfa and gauze bandage to ensure continued blood supply and protect the attached pedicle.
Saucerization Excision Additional Text (Leave Blank If You Do Not Want): The margin was drawn around the clinically apparent lesion.  Incisions were then made along these lines, in a tangential fashion, to the appropriate tissue plane and the lesion was extirpated.
Where Do You Want The Question To Include Opioid Counseling Located?: Case Summary Tab
Dorsal Nasal Flap Text: The defect edges were debeveled with a #15 scalpel blade.  Given the location of the defect and the proximity to free margins a dorsal nasal flap was deemed most appropriate.  Using a sterile surgical marker, an appropriate dorsal nasal flap was drawn around the defect.    The area thus outlined was incised deep to adipose tissue with a #15 scalpel blade.  The skin margins were undermined to an appropriate distance in all directions utilizing iris scissors.
Modified Advancement Flap Text: The defect edges were debeveled with a #15 scalpel blade.  Given the location of the defect, shape of the defect and the proximity to free margins a modified advancement flap was deemed most appropriate.  Using a sterile surgical marker, an appropriate advancement flap was drawn incorporating the defect and placing the expected incisions within the relaxed skin tension lines where possible.    The area thus outlined was incised deep to adipose tissue with a #15 scalpel blade.  The skin margins were undermined to an appropriate distance in all directions utilizing iris scissors.
Hemostasis: Electrocautery
Consent was obtained from the patient. The risks and benefits to therapy were discussed in detail. Specifically, the risks of infection, scarring, bleeding, prolonged wound healing, incomplete removal, allergy to anesthesia, nerve injury and recurrence were addressed. Prior to the procedure, the treatment site was clearly identified and confirmed by the patient. All components of Universal Protocol/PAUSE Rule completed.
Mercedes Flap Text: The defect edges were debeveled with a #15 scalpel blade.  Given the location of the defect, shape of the defect and the proximity to free margins a Mercedes flap was deemed most appropriate.  Using a sterile surgical marker, an appropriate advancement flap was drawn incorporating the defect and placing the expected incisions within the relaxed skin tension lines where possible. The area thus outlined was incised deep to adipose tissue with a #15 scalpel blade.  The skin margins were undermined to an appropriate distance in all directions utilizing iris scissors.
Complex Repair And O-T Advancement Flap Text: The defect edges were debeveled with a #15 scalpel blade.  The primary defect was closed partially with a complex linear closure.  Given the location of the remaining defect, shape of the defect and the proximity to free margins an O-T advancement flap was deemed most appropriate for complete closure of the defect.  Using a sterile surgical marker, an appropriate advancement flap was drawn incorporating the defect and placing the expected incisions within the relaxed skin tension lines where possible.    The area thus outlined was incised deep to adipose tissue with a #15 scalpel blade.  The skin margins were undermined to an appropriate distance in all directions utilizing iris scissors.
Slit Excision Additional Text (Leave Blank If You Do Not Want): A linear line was drawn on the skin overlying the lesion. An incision was made slowly until the lesion was visualized.  Once visualized, the lesion was removed with blunt dissection.
Deep Sutures: 3-0 Vicryl
Posterior Auricular Interpolation Flap Text: A decision was made to reconstruct the defect utilizing an interpolation axial flap and a staged reconstruction.  A telfa template was made of the defect.  This telfa template was then used to outline the posterior auricular interpolation flap.  The donor area for the pedicle flap was then injected with anesthesia.  The flap was excised through the skin and subcutaneous tissue down to the layer of the underlying musculature.  The pedicle flap was carefully excised within this deep plane to maintain its blood supply.  The edges of the donor site were undermined.   The donor site was closed in a primary fashion.  The pedicle was then rotated into position and sutured.  Once the tube was sutured into place, adequate blood supply was confirmed with blanching and refill.  The pedicle was then wrapped with xeroform gauze and dressed appropriately with a telfa and gauze bandage to ensure continued blood supply and protect the attached pedicle.
Mucosal Advancement Flap Text: Given the location of the defect, shape of the defect and the proximity to free margins a mucosal advancement flap was deemed most appropriate. Incisions were made with a 15 blade scalpel in the appropriate fashion along the cutaneous vermillion border and the mucosal lip. The remaining actinically damaged mucosal tissue was excised.  The mucosal advancement flap was then elevated to the gingival sulcus with care taken to preserve the neurovascular structures and advanced into the primary defect. Care was taken to ensure that precise realignment of the vermilion border was achieved.
Graft Donor Site Bandage (Optional-Leave Blank If You Don't Want In Note): Steri-strips and a pressure bandage were applied to the donor site.
Size Of Lesion In Cm: 3.1
Island Pedicle Flap Text: The defect edges were debeveled with a #15 scalpel blade.  Given the location of the defect, shape of the defect and the proximity to free margins an island pedicle advancement flap was deemed most appropriate.  Using a sterile surgical marker, an appropriate advancement flap was drawn incorporating the defect, outlining the appropriate donor tissue and placing the expected incisions within the relaxed skin tension lines where possible.    The area thus outlined was incised deep to adipose tissue with a #15 scalpel blade.  The skin margins were undermined to an appropriate distance in all directions around the primary defect and laterally outward around the island pedicle utilizing iris scissors.  There was minimal undermining beneath the pedicle flap.
Complex Repair And O-L Flap Text: The defect edges were debeveled with a #15 scalpel blade.  The primary defect was closed partially with a complex linear closure.  Given the location of the remaining defect, shape of the defect and the proximity to free margins an O-L flap was deemed most appropriate for complete closure of the defect.  Using a sterile surgical marker, an appropriate flap was drawn incorporating the defect and placing the expected incisions within the relaxed skin tension lines where possible.    The area thus outlined was incised deep to adipose tissue with a #15 scalpel blade.  The skin margins were undermined to an appropriate distance in all directions utilizing iris scissors.
Post-Care Instructions: I reviewed with the patient in detail post-care instructions:\\n1. Apply bacitracin over the site few times per day for 5 days\\n Shower starting tomorrow \\n\\nPatient is not to engage in any heavy lifting, exercise, hot tub, or swimming for the next 14 days. Should the patient develop any fevers, chills, bleeding, severe pain patient will contact the office immediately.
Hatchet Flap Text: The defect edges were debeveled with a #15 scalpel blade.  Given the location of the defect, shape of the defect and the proximity to free margins a hatchet flap was deemed most appropriate.  Using a sterile surgical marker, an appropriate hatchet flap was drawn incorporating the defect and placing the expected incisions within the relaxed skin tension lines where possible.    The area thus outlined was incised deep to adipose tissue with a #15 scalpel blade.  The skin margins were undermined to an appropriate distance in all directions utilizing iris scissors.
Detail Level: Detailed
Complex Repair And Melolabial Flap Text: The defect edges were debeveled with a #15 scalpel blade.  The primary defect was closed partially with a complex linear closure.  Given the location of the remaining defect, shape of the defect and the proximity to free margins a melolabial flap was deemed most appropriate for complete closure of the defect.  Using a sterile surgical marker, an appropriate advancement flap was drawn incorporating the defect and placing the expected incisions within the relaxed skin tension lines where possible.    The area thus outlined was incised deep to adipose tissue with a #15 scalpel blade.  The skin margins were undermined to an appropriate distance in all directions utilizing iris scissors.
Information: Selecting Yes will display possible errors in your note based on the variables you have selected. This validation is only offered as a suggestion for you. PLEASE NOTE THAT THE VALIDATION TEXT WILL BE REMOVED WHEN YOU FINALIZE YOUR NOTE. IF YOU WANT TO FAX A PRELIMINARY NOTE YOU WILL NEED TO TOGGLE THIS TO 'NO' IF YOU DO NOT WANT IT IN YOUR FAXED NOTE.
Complex Repair And Bilobe Flap Text: The defect edges were debeveled with a #15 scalpel blade.  The primary defect was closed partially with a complex linear closure.  Given the location of the remaining defect, shape of the defect and the proximity to free margins a bilobe flap was deemed most appropriate for complete closure of the defect.  Using a sterile surgical marker, an appropriate advancement flap was drawn incorporating the defect and placing the expected incisions within the relaxed skin tension lines where possible.    The area thus outlined was incised deep to adipose tissue with a #15 scalpel blade.  The skin margins were undermined to an appropriate distance in all directions utilizing iris scissors.
Paramedian Forehead Flap Text: A decision was made to reconstruct the defect utilizing an interpolation axial flap and a staged reconstruction.  A telfa template was made of the defect.  This telfa template was then used to outline the paramedian forehead pedicle flap.  The donor area for the pedicle flap was then injected with anesthesia.  The flap was excised through the skin and subcutaneous tissue down to the layer of the underlying musculature.  The pedicle flap was carefully excised within this deep plane to maintain its blood supply.  The edges of the donor site were undermined.   The donor site was closed in a primary fashion.  The pedicle was then rotated into position and sutured.  Once the tube was sutured into place, adequate blood supply was confirmed with blanching and refill.  The pedicle was then wrapped with xeroform gauze and dressed appropriately with a telfa and gauze bandage to ensure continued blood supply and protect the attached pedicle.
Dressing: steri-strips
Billing Type: Third-Party Bill
Island Pedicle Flap With Canthal Suspension Text: The defect edges were debeveled with a #15 scalpel blade.  Given the location of the defect, shape of the defect and the proximity to free margins an island pedicle advancement flap was deemed most appropriate.  Using a sterile surgical marker, an appropriate advancement flap was drawn incorporating the defect, outlining the appropriate donor tissue and placing the expected incisions within the relaxed skin tension lines where possible. The area thus outlined was incised deep to adipose tissue with a #15 scalpel blade.  The skin margins were undermined to an appropriate distance in all directions around the primary defect and laterally outward around the island pedicle utilizing iris scissors.  There was minimal undermining beneath the pedicle flap. A suspension suture was placed in the canthal tendon to prevent tension and prevent ectropion.
Estimated Blood Loss (Cc): minimal
Excisional Biopsy Additional Text (Leave Blank If You Do Not Want): The margin was drawn around the clinically apparent lesion. An elliptical shape was then drawn on the skin incorporating the lesion and margins.  Incisions were then made along these lines to the appropriate tissue plane and the lesion was extirpated.
Size Of Margin In Cm: 0.2
Double Island Pedicle Flap Text: The defect edges were debeveled with a #15 scalpel blade.  Given the location of the defect, shape of the defect and the proximity to free margins a double island pedicle advancement flap was deemed most appropriate.  Using a sterile surgical marker, an appropriate advancement flap was drawn incorporating the defect, outlining the appropriate donor tissue and placing the expected incisions within the relaxed skin tension lines where possible.    The area thus outlined was incised deep to adipose tissue with a #15 scalpel blade.  The skin margins were undermined to an appropriate distance in all directions around the primary defect and laterally outward around the island pedicle utilizing iris scissors.  There was minimal undermining beneath the pedicle flap.
Lip Wedge Excision Repair Text: Given the location of the defect and the proximity to free margins a full thickness wedge repair was deemed most appropriate.  Using a sterile surgical marker, the appropriate repair was drawn incorporating the defect and placing the expected incisions perpendicular to the vermilion border.  The vermilion border was also meticulously outlined to ensure appropriate reapproximation during the repair.  The area thus outlined was incised through and through with a #15 scalpel blade.  The muscularis and dermis were reaproximated with deep sutures following hemostasis. Care was taken to realign the vermilion border before proceeding with the superficial closure.  Once the vermilion was realigned the superfical and mucosal closure was finished.
Perilesional Excision Additional Text (Leave Blank If You Do Not Want): The margin was drawn around the clinically apparent lesion. Incisions were then made along these lines to the appropriate tissue plane and the lesion was extirpated.
Alar Island Pedicle Flap Text: The defect edges were debeveled with a #15 scalpel blade.  Given the location of the defect, shape of the defect and the proximity to the alar rim an island pedicle advancement flap was deemed most appropriate.  Using a sterile surgical marker, an appropriate advancement flap was drawn incorporating the defect, outlining the appropriate donor tissue and placing the expected incisions within the nasal ala running parallel to the alar rim. The area thus outlined was incised with a #15 scalpel blade.  The skin margins were undermined minimally to an appropriate distance in all directions around the primary defect and laterally outward around the island pedicle utilizing iris scissors.  There was minimal undermining beneath the pedicle flap.
Rotation Flap Text: The defect edges were debeveled with a #15 scalpel blade.  Given the location of the defect, shape of the defect and the proximity to free margins a rotation flap was deemed most appropriate.  Using a sterile surgical marker, an appropriate rotation flap was drawn incorporating the defect and placing the expected incisions within the relaxed skin tension lines where possible.    The area thus outlined was incised deep to adipose tissue with a #15 scalpel blade.  The skin margins were undermined to an appropriate distance in all directions utilizing iris scissors.
Home Suture Removal Text: Patient was provided a home suture removal kit and will remove their sutures at home.  If they have any questions or difficulties they will call the office.
Island Pedicle Flap-Requiring Vessel Identification Text: The defect edges were debeveled with a #15 scalpel blade.  Given the location of the defect, shape of the defect and the proximity to free margins an island pedicle advancement flap was deemed most appropriate.  Using a sterile surgical marker, an appropriate advancement flap was drawn, based on the axial vessel mentioned above, incorporating the defect, outlining the appropriate donor tissue and placing the expected incisions within the relaxed skin tension lines where possible.    The area thus outlined was incised deep to adipose tissue with a #15 scalpel blade.  The skin margins were undermined to an appropriate distance in all directions around the primary defect and laterally outward around the island pedicle utilizing iris scissors.  There was minimal undermining beneath the pedicle flap.
Complex Repair And Double M Plasty Text: The defect edges were debeveled with a #15 scalpel blade.  The primary defect was closed partially with a complex linear closure.  Given the location of the remaining defect, shape of the defect and the proximity to free margins a double M plasty was deemed most appropriate for complete closure of the defect.  Using a sterile surgical marker, an appropriate advancement flap was drawn incorporating the defect and placing the expected incisions within the relaxed skin tension lines where possible.    The area thus outlined was incised deep to adipose tissue with a #15 scalpel blade.  The skin margins were undermined to an appropriate distance in all directions utilizing iris scissors.
Complex Repair And Rhombic Flap Text: The defect edges were debeveled with a #15 scalpel blade.  The primary defect was closed partially with a complex linear closure.  Given the location of the remaining defect, shape of the defect and the proximity to free margins a rhombic flap was deemed most appropriate for complete closure of the defect.  Using a sterile surgical marker, an appropriate advancement flap was drawn incorporating the defect and placing the expected incisions within the relaxed skin tension lines where possible.    The area thus outlined was incised deep to adipose tissue with a #15 scalpel blade.  The skin margins were undermined to an appropriate distance in all directions utilizing iris scissors.
Repair Performed By Another Provider Text (Leave Blank If You Do Not Want): After the tissue was excised the defect was repaired by another provider.
Split-Thickness Skin Graft Text: The defect edges were debeveled with a #15 scalpel blade.  Given the location of the defect, shape of the defect and the proximity to free margins a split thickness skin graft was deemed most appropriate.  Using a sterile surgical marker, the primary defect shape was transferred to the donor site. The split thickness graft was then harvested.  The skin graft was then placed in the primary defect and oriented appropriately.
Complex Repair And Rotation Flap Text: The defect edges were debeveled with a #15 scalpel blade.  The primary defect was closed partially with a complex linear closure.  Given the location of the remaining defect, shape of the defect and the proximity to free margins a rotation flap was deemed most appropriate for complete closure of the defect.  Using a sterile surgical marker, an appropriate advancement flap was drawn incorporating the defect and placing the expected incisions within the relaxed skin tension lines where possible.    The area thus outlined was incised deep to adipose tissue with a #15 scalpel blade.  The skin margins were undermined to an appropriate distance in all directions utilizing iris scissors.
Ftsg Text: The defect edges were debeveled with a #15 scalpel blade.  Given the location of the defect, shape of the defect and the proximity to free margins a full thickness skin graft was deemed most appropriate.  Using a sterile surgical marker, the primary defect shape was transferred to the donor site. The area thus outlined was incised deep to adipose tissue with a #15 scalpel blade.  The harvested graft was then trimmed of adipose tissue until only dermis and epidermis was left.  The skin margins of the secondary defect were undermined to an appropriate distance in all directions utilizing iris scissors.  The secondary defect was closed with interrupted buried subcutaneous sutures.  The skin edges were then re-apposed with running  sutures.  The skin graft was then placed in the primary defect and oriented appropriately.
Spiral Flap Text: The defect edges were debeveled with a #15 scalpel blade.  Given the location of the defect, shape of the defect and the proximity to free margins a spiral flap was deemed most appropriate.  Using a sterile surgical marker, an appropriate rotation flap was drawn incorporating the defect and placing the expected incisions within the relaxed skin tension lines where possible. The area thus outlined was incised deep to adipose tissue with a #15 scalpel blade.  The skin margins were undermined to an appropriate distance in all directions utilizing iris scissors.
Excision Depth: adipose tissue
Cartilage Graft Text: The defect edges were debeveled with a #15 scalpel blade.  Given the location of the defect, shape of the defect, the fact the defect involved a full thickness cartilage defect a cartilage graft was deemed most appropriate.  An appropriate donor site was identified, cleansed, and anesthetized. The cartilage graft was then harvested and transferred to the recipient site, oriented appropriately and then sutured into place.  The secondary defect was then repaired using a primary closure.
No Repair - Repaired With Adjacent Surgical Defect Text (Leave Blank If You Do Not Want): After the excision the defect was repaired concurrently with another surgical defect which was in close approximation.
Keystone Flap Text: The defect edges were debeveled with a #15 scalpel blade.  Given the location of the defect, shape of the defect a keystone flap was deemed most appropriate.  Using a sterile surgical marker, an appropriate keystone flap was drawn incorporating the defect, outlining the appropriate donor tissue and placing the expected incisions within the relaxed skin tension lines where possible. The area thus outlined was incised deep to adipose tissue with a #15 scalpel blade.  The skin margins were undermined to an appropriate distance in all directions around the primary defect and laterally outward around the flap utilizing iris scissors.
Transposition Flap Text: The defect edges were debeveled with a #15 scalpel blade.  Given the location of the defect and the proximity to free margins a transposition flap was deemed most appropriate.  Using a sterile surgical marker, an appropriate transposition flap was drawn incorporating the defect.    The area thus outlined was incised deep to adipose tissue with a #15 scalpel blade.  The skin margins were undermined to an appropriate distance in all directions utilizing iris scissors.
Complex Repair And W Plasty Text: The defect edges were debeveled with a #15 scalpel blade.  The primary defect was closed partially with a complex linear closure.  Given the location of the remaining defect, shape of the defect and the proximity to free margins a W plasty was deemed most appropriate for complete closure of the defect.  Using a sterile surgical marker, an appropriate advancement flap was drawn incorporating the defect and placing the expected incisions within the relaxed skin tension lines where possible.    The area thus outlined was incised deep to adipose tissue with a #15 scalpel blade.  The skin margins were undermined to an appropriate distance in all directions utilizing iris scissors.
Scalpel Size: 15 blade
Complex Repair And Transposition Flap Text: The defect edges were debeveled with a #15 scalpel blade.  The primary defect was closed partially with a complex linear closure.  Given the location of the remaining defect, shape of the defect and the proximity to free margins a transposition flap was deemed most appropriate for complete closure of the defect.  Using a sterile surgical marker, an appropriate advancement flap was drawn incorporating the defect and placing the expected incisions within the relaxed skin tension lines where possible.    The area thus outlined was incised deep to adipose tissue with a #15 scalpel blade.  The skin margins were undermined to an appropriate distance in all directions utilizing iris scissors.
Complex Repair And Z Plasty Text: The defect edges were debeveled with a #15 scalpel blade.  The primary defect was closed partially with a complex linear closure.  Given the location of the remaining defect, shape of the defect and the proximity to free margins a Z plasty was deemed most appropriate for complete closure of the defect.  Using a sterile surgical marker, an appropriate advancement flap was drawn incorporating the defect and placing the expected incisions within the relaxed skin tension lines where possible.    The area thus outlined was incised deep to adipose tissue with a #15 scalpel blade.  The skin margins were undermined to an appropriate distance in all directions utilizing iris scissors.
Star Wedge Flap Text: The defect edges were debeveled with a #15 scalpel blade.  Given the location of the defect, shape of the defect and the proximity to free margins a star wedge flap was deemed most appropriate.  Using a sterile surgical marker, an appropriate rotation flap was drawn incorporating the defect and placing the expected incisions within the relaxed skin tension lines where possible. The area thus outlined was incised deep to adipose tissue with a #15 scalpel blade.  The skin margins were undermined to an appropriate distance in all directions utilizing iris scissors.
Excision Method: Elliptical
Complex Repair And V-Y Plasty Text: The defect edges were debeveled with a #15 scalpel blade.  The primary defect was closed partially with a complex linear closure.  Given the location of the remaining defect, shape of the defect and the proximity to free margins a V-Y plasty was deemed most appropriate for complete closure of the defect.  Using a sterile surgical marker, an appropriate advancement flap was drawn incorporating the defect and placing the expected incisions within the relaxed skin tension lines where possible.    The area thus outlined was incised deep to adipose tissue with a #15 scalpel blade.  The skin margins were undermined to an appropriate distance in all directions utilizing iris scissors.
Advancement Flap (Single) Text: The defect edges were debeveled with a #15 scalpel blade.  Given the location of the defect and the proximity to free margins a single advancement flap was deemed most appropriate.  Using a sterile surgical marker, an appropriate advancement flap was drawn incorporating the defect and placing the expected incisions within the relaxed skin tension lines where possible.    The area thus outlined was incised deep to adipose tissue with a #15 scalpel blade.  The skin margins were undermined to an appropriate distance in all directions utilizing iris scissors.
Composite Graft Text: The defect edges were debeveled with a #15 scalpel blade.  Given the location of the defect, shape of the defect, the proximity to free margins and the fact the defect was full thickness a composite graft was deemed most appropriate.  The defect was outline and then transferred to the donor site.  A full thickness graft was then excised from the donor site. The graft was then placed in the primary defect, oriented appropriately and then sutured into place.  The secondary defect was then repaired using a primary closure.
Muscle Hinge Flap Text: The defect edges were debeveled with a #15 scalpel blade.  Given the size, depth and location of the defect and the proximity to free margins a muscle hinge flap was deemed most appropriate.  Using a sterile surgical marker, an appropriate hinge flap was drawn incorporating the defect. The area thus outlined was incised with a #15 scalpel blade.  The skin margins were undermined to an appropriate distance in all directions utilizing iris scissors.
Dermal Closure: buried vertical mattress
O-T Plasty Text: The defect edges were debeveled with a #15 scalpel blade.  Given the location of the defect, shape of the defect and the proximity to free margins an O-T plasty was deemed most appropriate.  Using a sterile surgical marker, an appropriate O-T plasty was drawn incorporating the defect and placing the expected incisions within the relaxed skin tension lines where possible.    The area thus outlined was incised deep to adipose tissue with a #15 scalpel blade.  The skin margins were undermined to an appropriate distance in all directions utilizing iris scissors.
Complex Repair And Dorsal Nasal Flap Text: The defect edges were debeveled with a #15 scalpel blade.  The primary defect was closed partially with a complex linear closure.  Given the location of the remaining defect, shape of the defect and the proximity to free margins a dorsal nasal flap was deemed most appropriate for complete closure of the defect.  Using a sterile surgical marker, an appropriate flap was drawn incorporating the defect and placing the expected incisions within the relaxed skin tension lines where possible.    The area thus outlined was incised deep to adipose tissue with a #15 scalpel blade.  The skin margins were undermined to an appropriate distance in all directions utilizing iris scissors.
O-Z Plasty Text: The defect edges were debeveled with a #15 scalpel blade.  Given the location of the defect, shape of the defect and the proximity to free margins an O-Z plasty (double transposition flap) was deemed most appropriate.  Using a sterile surgical marker, the appropriate transposition flaps were drawn incorporating the defect and placing the expected incisions within the relaxed skin tension lines where possible.    The area thus outlined was incised deep to adipose tissue with a #15 scalpel blade.  The skin margins were undermined to an appropriate distance in all directions utilizing iris scissors.  Hemostasis was achieved with electrocautery.  The flaps were then transposed into place, one clockwise and the other counterclockwise, and anchored with interrupted buried subcutaneous sutures.
Repair Anesthesia Method: local infiltration
Medical Necessity Clause: This procedure was medically necessary because the lesion that was treated was:
Lab: [id number]
Advancement Flap (Double) Text: The defect edges were debeveled with a #15 scalpel blade.  Given the location of the defect and the proximity to free margins a double advancement flap was deemed most appropriate.  Using a sterile surgical marker, the appropriate advancement flaps were drawn incorporating the defect and placing the expected incisions within the relaxed skin tension lines where possible.    The area thus outlined was incised deep to adipose tissue with a #15 scalpel blade.  The skin margins were undermined to an appropriate distance in all directions utilizing iris scissors.
Complex Repair And M Plasty Text: The defect edges were debeveled with a #15 scalpel blade.  The primary defect was closed partially with a complex linear closure.  Given the location of the remaining defect, shape of the defect and the proximity to free margins an M plasty was deemed most appropriate for complete closure of the defect.  Using a sterile surgical marker, an appropriate advancement flap was drawn incorporating the defect and placing the expected incisions within the relaxed skin tension lines where possible.    The area thus outlined was incised deep to adipose tissue with a #15 scalpel blade.  The skin margins were undermined to an appropriate distance in all directions utilizing iris scissors.
Melolabial Transposition Flap Text: The defect edges were debeveled with a #15 scalpel blade.  Given the location of the defect and the proximity to free margins a melolabial flap was deemed most appropriate.  Using a sterile surgical marker, an appropriate melolabial transposition flap was drawn incorporating the defect.    The area thus outlined was incised deep to adipose tissue with a #15 scalpel blade.  The skin margins were undermined to an appropriate distance in all directions utilizing iris scissors.
Complex Repair And Ftsg Text: The defect edges were debeveled with a #15 scalpel blade.  The primary defect was closed partially with a complex linear closure.  Given the location of the defect, shape of the defect and the proximity to free margins a full thickness skin graft was deemed most appropriate to repair the remaining defect.  The graft was trimmed to fit the size of the remaining defect.  The graft was then placed in the primary defect, oriented appropriately, and sutured into place.
Epidermal Autograft Text: The defect edges were debeveled with a #15 scalpel blade.  Given the location of the defect, shape of the defect and the proximity to free margins an epidermal autograft was deemed most appropriate.  Using a sterile surgical marker, the primary defect shape was transferred to the donor site. The epidermal graft was then harvested.  The skin graft was then placed in the primary defect and oriented appropriately.
Complex Repair And Split-Thickness Skin Graft Text: The defect edges were debeveled with a #15 scalpel blade.  The primary defect was closed partially with a complex linear closure.  Given the location of the defect, shape of the defect and the proximity to free margins a split thickness skin graft was deemed most appropriate to repair the remaining defect.  The graft was trimmed to fit the size of the remaining defect.  The graft was then placed in the primary defect, oriented appropriately, and sutured into place.
Repair Type: Complex
Dermal Autograft Text: The defect edges were debeveled with a #15 scalpel blade.  Given the location of the defect, shape of the defect and the proximity to free margins a dermal autograft was deemed most appropriate.  Using a sterile surgical marker, the primary defect shape was transferred to the donor site. The area thus outlined was incised deep to adipose tissue with a #15 scalpel blade.  The harvested graft was then trimmed of adipose and epidermal tissue until only dermis was left.  The skin graft was then placed in the primary defect and oriented appropriately.
Burow's Advancement Flap Text: The defect edges were debeveled with a #15 scalpel blade.  Given the location of the defect and the proximity to free margins a Burow's advancement flap was deemed most appropriate.  Using a sterile surgical marker, the appropriate advancement flap was drawn incorporating the defect and placing the expected incisions within the relaxed skin tension lines where possible.    The area thus outlined was incised deep to adipose tissue with a #15 scalpel blade.  The skin margins were undermined to an appropriate distance in all directions utilizing iris scissors.
Lab Facility: 
Epidermal Sutures: 4-0 Ethilon
Rhombic Flap Text: The defect edges were debeveled with a #15 scalpel blade.  Given the location of the defect and the proximity to free margins a rhombic flap was deemed most appropriate.  Using a sterile surgical marker, an appropriate rhombic flap was drawn incorporating the defect.    The area thus outlined was incised deep to adipose tissue with a #15 scalpel blade.  The skin margins were undermined to an appropriate distance in all directions utilizing iris scissors.
Double O-Z Plasty Text: The defect edges were debeveled with a #15 scalpel blade.  Given the location of the defect, shape of the defect and the proximity to free margins a Double O-Z plasty (double transposition flap) was deemed most appropriate.  Using a sterile surgical marker, the appropriate transposition flaps were drawn incorporating the defect and placing the expected incisions within the relaxed skin tension lines where possible. The area thus outlined was incised deep to adipose tissue with a #15 scalpel blade.  The skin margins were undermined to an appropriate distance in all directions utilizing iris scissors.  Hemostasis was achieved with electrocautery.  The flaps were then transposed into place, one clockwise and the other counterclockwise, and anchored with interrupted buried subcutaneous sutures.

## 2020-01-01 ENCOUNTER — ANESTHESIA (OUTPATIENT)
Dept: SURGERY | Facility: MEDICAL CENTER | Age: 85
DRG: 481 | End: 2020-01-01
Payer: MEDICARE

## 2020-01-01 ENCOUNTER — APPOINTMENT (OUTPATIENT)
Dept: RADIOLOGY | Facility: MEDICAL CENTER | Age: 85
DRG: 481 | End: 2020-01-01
Attending: EMERGENCY MEDICINE
Payer: MEDICARE

## 2020-01-01 ENCOUNTER — HOSPITAL ENCOUNTER (INPATIENT)
Facility: MEDICAL CENTER | Age: 85
LOS: 8 days | DRG: 481 | End: 2020-12-03
Attending: EMERGENCY MEDICINE | Admitting: INTERNAL MEDICINE
Payer: MEDICARE

## 2020-01-01 ENCOUNTER — ANESTHESIA EVENT (OUTPATIENT)
Dept: SURGERY | Facility: MEDICAL CENTER | Age: 85
DRG: 481 | End: 2020-01-01
Payer: MEDICARE

## 2020-01-01 ENCOUNTER — APPOINTMENT (OUTPATIENT)
Dept: RADIOLOGY | Facility: MEDICAL CENTER | Age: 85
DRG: 481 | End: 2020-01-01
Attending: ORTHOPAEDIC SURGERY
Payer: MEDICARE

## 2020-01-01 VITALS
RESPIRATION RATE: 16 BRPM | DIASTOLIC BLOOD PRESSURE: 58 MMHG | WEIGHT: 130.07 LBS | HEIGHT: 61 IN | HEART RATE: 65 BPM | BODY MASS INDEX: 24.56 KG/M2 | OXYGEN SATURATION: 93 % | SYSTOLIC BLOOD PRESSURE: 110 MMHG | TEMPERATURE: 98.2 F

## 2020-01-01 DIAGNOSIS — S72.001A CLOSED FRACTURE OF RIGHT HIP, INITIAL ENCOUNTER (HCC): ICD-10-CM

## 2020-01-01 DIAGNOSIS — F02.818 LATE ONSET ALZHEIMER'S DISEASE WITH BEHAVIORAL DISTURBANCE (HCC): ICD-10-CM

## 2020-01-01 DIAGNOSIS — G30.1 LATE ONSET ALZHEIMER'S DISEASE WITH BEHAVIORAL DISTURBANCE (HCC): ICD-10-CM

## 2020-01-01 DIAGNOSIS — R41.3 MEMORY IMPAIRMENT: ICD-10-CM

## 2020-01-01 DIAGNOSIS — I48.0 PAROXYSMAL ATRIAL FIBRILLATION (HCC): ICD-10-CM

## 2020-01-01 DIAGNOSIS — Z79.01 CHRONIC ANTICOAGULATION: ICD-10-CM

## 2020-01-01 LAB
ALBUMIN SERPL BCP-MCNC: 2.8 G/DL (ref 3.2–4.9)
ALBUMIN SERPL BCP-MCNC: 3.1 G/DL (ref 3.2–4.9)
ALBUMIN SERPL BCP-MCNC: 3.5 G/DL (ref 3.2–4.9)
ALBUMIN SERPL BCP-MCNC: 3.7 G/DL (ref 3.2–4.9)
ALBUMIN/GLOB SERPL: 1.2 G/DL
ALBUMIN/GLOB SERPL: 1.6 G/DL
ALBUMIN/GLOB SERPL: 1.9 G/DL
ALBUMIN/GLOB SERPL: 2.1 G/DL
ALP SERPL-CCNC: 52 U/L (ref 30–99)
ALP SERPL-CCNC: 53 U/L (ref 30–99)
ALP SERPL-CCNC: 67 U/L (ref 30–99)
ALP SERPL-CCNC: 75 U/L (ref 30–99)
ALT SERPL-CCNC: 12 U/L (ref 2–50)
ALT SERPL-CCNC: 13 U/L (ref 2–50)
ALT SERPL-CCNC: 15 U/L (ref 2–50)
ALT SERPL-CCNC: 17 U/L (ref 2–50)
ANION GAP SERPL CALC-SCNC: 6 MMOL/L (ref 7–16)
ANION GAP SERPL CALC-SCNC: 8 MMOL/L (ref 7–16)
ANION GAP SERPL CALC-SCNC: 9 MMOL/L (ref 7–16)
APPEARANCE UR: CLEAR
AST SERPL-CCNC: 17 U/L (ref 12–45)
AST SERPL-CCNC: 17 U/L (ref 12–45)
AST SERPL-CCNC: 18 U/L (ref 12–45)
AST SERPL-CCNC: 25 U/L (ref 12–45)
BASOPHILS # BLD AUTO: 0.2 % (ref 0–1.8)
BASOPHILS # BLD AUTO: 0.3 % (ref 0–1.8)
BASOPHILS # BLD AUTO: 0.4 % (ref 0–1.8)
BASOPHILS # BLD: 0.02 K/UL (ref 0–0.12)
BASOPHILS # BLD: 0.03 K/UL (ref 0–0.12)
BILIRUB SERPL-MCNC: 0.7 MG/DL (ref 0.1–1.5)
BILIRUB SERPL-MCNC: 0.8 MG/DL (ref 0.1–1.5)
BILIRUB SERPL-MCNC: 0.9 MG/DL (ref 0.1–1.5)
BILIRUB SERPL-MCNC: 1 MG/DL (ref 0.1–1.5)
BILIRUB UR QL STRIP.AUTO: NEGATIVE
BUN SERPL-MCNC: 17 MG/DL (ref 8–22)
BUN SERPL-MCNC: 28 MG/DL (ref 8–22)
BUN SERPL-MCNC: 29 MG/DL (ref 8–22)
CALCIUM SERPL-MCNC: 8.4 MG/DL (ref 8.5–10.5)
CALCIUM SERPL-MCNC: 8.9 MG/DL (ref 8.5–10.5)
CALCIUM SERPL-MCNC: 9 MG/DL (ref 8.5–10.5)
CALCIUM SERPL-MCNC: 9.5 MG/DL (ref 8.5–10.5)
CALCIUM SERPL-MCNC: 9.6 MG/DL (ref 8.5–10.5)
CHLORIDE SERPL-SCNC: 106 MMOL/L (ref 96–112)
CHLORIDE SERPL-SCNC: 107 MMOL/L (ref 96–112)
CHLORIDE SERPL-SCNC: 107 MMOL/L (ref 96–112)
CHLORIDE SERPL-SCNC: 110 MMOL/L (ref 96–112)
CHLORIDE SERPL-SCNC: 112 MMOL/L (ref 96–112)
CO2 SERPL-SCNC: 22 MMOL/L (ref 20–33)
CO2 SERPL-SCNC: 22 MMOL/L (ref 20–33)
CO2 SERPL-SCNC: 23 MMOL/L (ref 20–33)
COLOR UR: YELLOW
COVID ORDER STATUS COVID19: NORMAL
CREAT SERPL-MCNC: 0.53 MG/DL (ref 0.5–1.4)
CREAT SERPL-MCNC: 0.54 MG/DL (ref 0.5–1.4)
CREAT SERPL-MCNC: 0.58 MG/DL (ref 0.5–1.4)
CREAT SERPL-MCNC: 0.65 MG/DL (ref 0.5–1.4)
CREAT SERPL-MCNC: 0.67 MG/DL (ref 0.5–1.4)
EKG IMPRESSION: NORMAL
EOSINOPHIL # BLD AUTO: 0.01 K/UL (ref 0–0.51)
EOSINOPHIL # BLD AUTO: 0.03 K/UL (ref 0–0.51)
EOSINOPHIL # BLD AUTO: 0.05 K/UL (ref 0–0.51)
EOSINOPHIL # BLD AUTO: 0.05 K/UL (ref 0–0.51)
EOSINOPHIL # BLD AUTO: 0.07 K/UL (ref 0–0.51)
EOSINOPHIL NFR BLD: 0.1 % (ref 0–6.9)
EOSINOPHIL NFR BLD: 0.4 % (ref 0–6.9)
EOSINOPHIL NFR BLD: 0.6 % (ref 0–6.9)
EOSINOPHIL NFR BLD: 0.7 % (ref 0–6.9)
EOSINOPHIL NFR BLD: 0.9 % (ref 0–6.9)
ERYTHROCYTE [DISTWIDTH] IN BLOOD BY AUTOMATED COUNT: 47.2 FL (ref 35.9–50)
ERYTHROCYTE [DISTWIDTH] IN BLOOD BY AUTOMATED COUNT: 47.6 FL (ref 35.9–50)
ERYTHROCYTE [DISTWIDTH] IN BLOOD BY AUTOMATED COUNT: 47.8 FL (ref 35.9–50)
ERYTHROCYTE [DISTWIDTH] IN BLOOD BY AUTOMATED COUNT: 48.4 FL (ref 35.9–50)
ERYTHROCYTE [DISTWIDTH] IN BLOOD BY AUTOMATED COUNT: 48.9 FL (ref 35.9–50)
FLUAV RNA SPEC QL NAA+PROBE: NEGATIVE
FLUBV RNA SPEC QL NAA+PROBE: NEGATIVE
GLOBULIN SER CALC-MCNC: 1.7 G/DL (ref 1.9–3.5)
GLOBULIN SER CALC-MCNC: 1.9 G/DL (ref 1.9–3.5)
GLOBULIN SER CALC-MCNC: 2 G/DL (ref 1.9–3.5)
GLOBULIN SER CALC-MCNC: 2.4 G/DL (ref 1.9–3.5)
GLUCOSE SERPL-MCNC: 117 MG/DL (ref 65–99)
GLUCOSE SERPL-MCNC: 119 MG/DL (ref 65–99)
GLUCOSE SERPL-MCNC: 121 MG/DL (ref 65–99)
GLUCOSE SERPL-MCNC: 135 MG/DL (ref 65–99)
GLUCOSE SERPL-MCNC: 96 MG/DL (ref 65–99)
GLUCOSE UR STRIP.AUTO-MCNC: NEGATIVE MG/DL
HCT VFR BLD AUTO: 29.1 % (ref 37–47)
HCT VFR BLD AUTO: 30 % (ref 37–47)
HCT VFR BLD AUTO: 30.3 % (ref 37–47)
HCT VFR BLD AUTO: 36.4 % (ref 37–47)
HCT VFR BLD AUTO: 39 % (ref 37–47)
HGB BLD-MCNC: 10.1 G/DL (ref 12–16)
HGB BLD-MCNC: 10.1 G/DL (ref 12–16)
HGB BLD-MCNC: 11.8 G/DL (ref 12–16)
HGB BLD-MCNC: 12.9 G/DL (ref 12–16)
HGB BLD-MCNC: 9.7 G/DL (ref 12–16)
IMM GRANULOCYTES # BLD AUTO: 0.03 K/UL (ref 0–0.11)
IMM GRANULOCYTES # BLD AUTO: 0.03 K/UL (ref 0–0.11)
IMM GRANULOCYTES # BLD AUTO: 0.04 K/UL (ref 0–0.11)
IMM GRANULOCYTES # BLD AUTO: 0.04 K/UL (ref 0–0.11)
IMM GRANULOCYTES # BLD AUTO: 0.05 K/UL (ref 0–0.11)
IMM GRANULOCYTES NFR BLD AUTO: 0.4 % (ref 0–0.9)
IMM GRANULOCYTES NFR BLD AUTO: 0.4 % (ref 0–0.9)
IMM GRANULOCYTES NFR BLD AUTO: 0.5 % (ref 0–0.9)
IMM GRANULOCYTES NFR BLD AUTO: 0.6 % (ref 0–0.9)
IMM GRANULOCYTES NFR BLD AUTO: 0.6 % (ref 0–0.9)
INR PPP: 1.08 (ref 0.87–1.13)
KETONES UR STRIP.AUTO-MCNC: 15 MG/DL
LEUKOCYTE ESTERASE UR QL STRIP.AUTO: NEGATIVE
LYMPHOCYTES # BLD AUTO: 0.78 K/UL (ref 1–4.8)
LYMPHOCYTES # BLD AUTO: 0.86 K/UL (ref 1–4.8)
LYMPHOCYTES # BLD AUTO: 0.87 K/UL (ref 1–4.8)
LYMPHOCYTES # BLD AUTO: 1.01 K/UL (ref 1–4.8)
LYMPHOCYTES # BLD AUTO: 1.03 K/UL (ref 1–4.8)
LYMPHOCYTES NFR BLD: 11 % (ref 22–41)
LYMPHOCYTES NFR BLD: 12 % (ref 22–41)
LYMPHOCYTES NFR BLD: 12.5 % (ref 22–41)
LYMPHOCYTES NFR BLD: 12.6 % (ref 22–41)
LYMPHOCYTES NFR BLD: 8.6 % (ref 22–41)
MCH RBC QN AUTO: 32.2 PG (ref 27–33)
MCH RBC QN AUTO: 32.6 PG (ref 27–33)
MCH RBC QN AUTO: 33.6 PG (ref 27–33)
MCH RBC QN AUTO: 33.8 PG (ref 27–33)
MCH RBC QN AUTO: 33.9 PG (ref 27–33)
MCHC RBC AUTO-ENTMCNC: 32.4 G/DL (ref 33.6–35)
MCHC RBC AUTO-ENTMCNC: 33.1 G/DL (ref 33.6–35)
MCHC RBC AUTO-ENTMCNC: 33.3 G/DL (ref 33.6–35)
MCHC RBC AUTO-ENTMCNC: 33.3 G/DL (ref 33.6–35)
MCHC RBC AUTO-ENTMCNC: 33.7 G/DL (ref 33.6–35)
MCV RBC AUTO: 100.7 FL (ref 81.4–97.8)
MCV RBC AUTO: 100.7 FL (ref 81.4–97.8)
MCV RBC AUTO: 101.3 FL (ref 81.4–97.8)
MCV RBC AUTO: 98.5 FL (ref 81.4–97.8)
MCV RBC AUTO: 99.5 FL (ref 81.4–97.8)
MICRO URNS: ABNORMAL
MONOCYTES # BLD AUTO: 0.71 K/UL (ref 0–0.85)
MONOCYTES # BLD AUTO: 0.72 K/UL (ref 0–0.85)
MONOCYTES # BLD AUTO: 0.83 K/UL (ref 0–0.85)
MONOCYTES # BLD AUTO: 0.84 K/UL (ref 0–0.85)
MONOCYTES # BLD AUTO: 0.89 K/UL (ref 0–0.85)
MONOCYTES NFR BLD AUTO: 10.3 % (ref 0–13.4)
MONOCYTES NFR BLD AUTO: 10.6 % (ref 0–13.4)
MONOCYTES NFR BLD AUTO: 11.1 % (ref 0–13.4)
MONOCYTES NFR BLD AUTO: 7.8 % (ref 0–13.4)
MONOCYTES NFR BLD AUTO: 9.8 % (ref 0–13.4)
NEUTROPHILS # BLD AUTO: 5.26 K/UL (ref 2–7.15)
NEUTROPHILS # BLD AUTO: 6 K/UL (ref 2–7.15)
NEUTROPHILS # BLD AUTO: 6.04 K/UL (ref 2–7.15)
NEUTROPHILS # BLD AUTO: 6.62 K/UL (ref 2–7.15)
NEUTROPHILS # BLD AUTO: 7.44 K/UL (ref 2–7.15)
NEUTROPHILS NFR BLD: 75.5 % (ref 44–72)
NEUTROPHILS NFR BLD: 75.5 % (ref 44–72)
NEUTROPHILS NFR BLD: 76.8 % (ref 44–72)
NEUTROPHILS NFR BLD: 77.2 % (ref 44–72)
NEUTROPHILS NFR BLD: 82.2 % (ref 44–72)
NITRITE UR QL STRIP.AUTO: NEGATIVE
NRBC # BLD AUTO: 0 K/UL
NRBC BLD-RTO: 0 /100 WBC
PH UR STRIP.AUTO: 6.5 [PH] (ref 5–8)
PLATELET # BLD AUTO: 101 K/UL (ref 164–446)
PLATELET # BLD AUTO: 134 K/UL (ref 164–446)
PLATELET # BLD AUTO: 146 K/UL (ref 164–446)
PLATELET # BLD AUTO: 159 K/UL (ref 164–446)
PLATELET # BLD AUTO: 177 K/UL (ref 164–446)
PMV BLD AUTO: 10 FL (ref 9–12.9)
PMV BLD AUTO: 10.1 FL (ref 9–12.9)
PMV BLD AUTO: 10.1 FL (ref 9–12.9)
PMV BLD AUTO: 10.4 FL (ref 9–12.9)
PMV BLD AUTO: 9.6 FL (ref 9–12.9)
POTASSIUM SERPL-SCNC: 3.4 MMOL/L (ref 3.6–5.5)
POTASSIUM SERPL-SCNC: 3.5 MMOL/L (ref 3.6–5.5)
POTASSIUM SERPL-SCNC: 3.5 MMOL/L (ref 3.6–5.5)
POTASSIUM SERPL-SCNC: 4 MMOL/L (ref 3.6–5.5)
POTASSIUM SERPL-SCNC: 4 MMOL/L (ref 3.6–5.5)
PROT SERPL-MCNC: 5.1 G/DL (ref 6–8.2)
PROT SERPL-MCNC: 5.2 G/DL (ref 6–8.2)
PROT SERPL-MCNC: 5.2 G/DL (ref 6–8.2)
PROT SERPL-MCNC: 5.6 G/DL (ref 6–8.2)
PROT UR QL STRIP: NEGATIVE MG/DL
PROTHROMBIN TIME: 14.3 SEC (ref 12–14.6)
RBC # BLD AUTO: 2.89 M/UL (ref 4.2–5.4)
RBC # BLD AUTO: 2.98 M/UL (ref 4.2–5.4)
RBC # BLD AUTO: 2.99 M/UL (ref 4.2–5.4)
RBC # BLD AUTO: 3.66 M/UL (ref 4.2–5.4)
RBC # BLD AUTO: 3.96 M/UL (ref 4.2–5.4)
RBC UR QL AUTO: NEGATIVE
RSV RNA SPEC QL NAA+PROBE: NEGATIVE
SARS-COV-2 RNA RESP QL NAA+PROBE: NOTDETECTED
SODIUM SERPL-SCNC: 136 MMOL/L (ref 135–145)
SODIUM SERPL-SCNC: 137 MMOL/L (ref 135–145)
SODIUM SERPL-SCNC: 138 MMOL/L (ref 135–145)
SODIUM SERPL-SCNC: 142 MMOL/L (ref 135–145)
SODIUM SERPL-SCNC: 143 MMOL/L (ref 135–145)
SP GR UR STRIP.AUTO: 1.02
SPECIMEN SOURCE: NORMAL
UROBILINOGEN UR STRIP.AUTO-MCNC: 0.2 MG/DL
WBC # BLD AUTO: 7 K/UL (ref 4.8–10.8)
WBC # BLD AUTO: 7.8 K/UL (ref 4.8–10.8)
WBC # BLD AUTO: 8 K/UL (ref 4.8–10.8)
WBC # BLD AUTO: 8.6 K/UL (ref 4.8–10.8)
WBC # BLD AUTO: 9.1 K/UL (ref 4.8–10.8)

## 2020-01-01 PROCEDURE — 700102 HCHG RX REV CODE 250 W/ 637 OVERRIDE(OP): Performed by: STUDENT IN AN ORGANIZED HEALTH CARE EDUCATION/TRAINING PROGRAM

## 2020-01-01 PROCEDURE — A9270 NON-COVERED ITEM OR SERVICE: HCPCS | Performed by: INTERNAL MEDICINE

## 2020-01-01 PROCEDURE — 80053 COMPREHEN METABOLIC PANEL: CPT

## 2020-01-01 PROCEDURE — 160036 HCHG PACU - EA ADDL 30 MINS PHASE I: Performed by: ORTHOPAEDIC SURGERY

## 2020-01-01 PROCEDURE — 501838 HCHG SUTURE GENERAL: Performed by: ORTHOPAEDIC SURGERY

## 2020-01-01 PROCEDURE — 160035 HCHG PACU - 1ST 60 MINS PHASE I: Performed by: ORTHOPAEDIC SURGERY

## 2020-01-01 PROCEDURE — 51798 US URINE CAPACITY MEASURE: CPT

## 2020-01-01 PROCEDURE — 770006 HCHG ROOM/CARE - MED/SURG/GYN SEMI*

## 2020-01-01 PROCEDURE — 160029 HCHG SURGERY MINUTES - 1ST 30 MINS LEVEL 4: Performed by: ORTHOPAEDIC SURGERY

## 2020-01-01 PROCEDURE — 0241U HCHG SARS-COV-2 COVID-19 NFCT DS RESP RNA 4 TRGT MIC: CPT

## 2020-01-01 PROCEDURE — 700102 HCHG RX REV CODE 250 W/ 637 OVERRIDE(OP): Performed by: INTERNAL MEDICINE

## 2020-01-01 PROCEDURE — 700111 HCHG RX REV CODE 636 W/ 250 OVERRIDE (IP): Performed by: ORTHOPAEDIC SURGERY

## 2020-01-01 PROCEDURE — 700101 HCHG RX REV CODE 250: Performed by: PHYSICIAN ASSISTANT

## 2020-01-01 PROCEDURE — 160009 HCHG ANES TIME/MIN: Performed by: ORTHOPAEDIC SURGERY

## 2020-01-01 PROCEDURE — 700111 HCHG RX REV CODE 636 W/ 250 OVERRIDE (IP): Performed by: INTERNAL MEDICINE

## 2020-01-01 PROCEDURE — 110371 HCHG SHELL REV 272: Performed by: ORTHOPAEDIC SURGERY

## 2020-01-01 PROCEDURE — A9270 NON-COVERED ITEM OR SERVICE: HCPCS | Performed by: HOSPITALIST

## 2020-01-01 PROCEDURE — 0QS636Z REPOSITION RIGHT UPPER FEMUR WITH INTRAMEDULLARY INTERNAL FIXATION DEVICE, PERCUTANEOUS APPROACH: ICD-10-PCS | Performed by: ORTHOPAEDIC SURGERY

## 2020-01-01 PROCEDURE — 160048 HCHG OR STATISTICAL LEVEL 1-5: Performed by: ORTHOPAEDIC SURGERY

## 2020-01-01 PROCEDURE — 500891 HCHG PACK, ORTHO MAJOR: Performed by: ORTHOPAEDIC SURGERY

## 2020-01-01 PROCEDURE — A9270 NON-COVERED ITEM OR SERVICE: HCPCS | Performed by: ORTHOPAEDIC SURGERY

## 2020-01-01 PROCEDURE — 700111 HCHG RX REV CODE 636 W/ 250 OVERRIDE (IP): Performed by: HOSPITALIST

## 2020-01-01 PROCEDURE — 99233 SBSQ HOSP IP/OBS HIGH 50: CPT | Performed by: STUDENT IN AN ORGANIZED HEALTH CARE EDUCATION/TRAINING PROGRAM

## 2020-01-01 PROCEDURE — 160002 HCHG RECOVERY MINUTES (STAT): Performed by: ORTHOPAEDIC SURGERY

## 2020-01-01 PROCEDURE — 73552 X-RAY EXAM OF FEMUR 2/>: CPT | Mod: RT

## 2020-01-01 PROCEDURE — 700102 HCHG RX REV CODE 250 W/ 637 OVERRIDE(OP): Performed by: ORTHOPAEDIC SURGERY

## 2020-01-01 PROCEDURE — 73502 X-RAY EXAM HIP UNI 2-3 VIEWS: CPT | Mod: RT

## 2020-01-01 PROCEDURE — 97166 OT EVAL MOD COMPLEX 45 MIN: CPT

## 2020-01-01 PROCEDURE — 85025 COMPLETE CBC W/AUTO DIFF WBC: CPT

## 2020-01-01 PROCEDURE — 36415 COLL VENOUS BLD VENIPUNCTURE: CPT

## 2020-01-01 PROCEDURE — 160041 HCHG SURGERY MINUTES - EA ADDL 1 MIN LEVEL 4: Performed by: ORTHOPAEDIC SURGERY

## 2020-01-01 PROCEDURE — 99232 SBSQ HOSP IP/OBS MODERATE 35: CPT | Performed by: STUDENT IN AN ORGANIZED HEALTH CARE EDUCATION/TRAINING PROGRAM

## 2020-01-01 PROCEDURE — A9270 NON-COVERED ITEM OR SERVICE: HCPCS | Performed by: STUDENT IN AN ORGANIZED HEALTH CARE EDUCATION/TRAINING PROGRAM

## 2020-01-01 PROCEDURE — 81003 URINALYSIS AUTO W/O SCOPE: CPT

## 2020-01-01 PROCEDURE — 700101 HCHG RX REV CODE 250: Performed by: ANESTHESIOLOGY

## 2020-01-01 PROCEDURE — 71045 X-RAY EXAM CHEST 1 VIEW: CPT

## 2020-01-01 PROCEDURE — C1713 ANCHOR/SCREW BN/BN,TIS/BN: HCPCS | Performed by: ORTHOPAEDIC SURGERY

## 2020-01-01 PROCEDURE — 700102 HCHG RX REV CODE 250 W/ 637 OVERRIDE(OP): Performed by: HOSPITALIST

## 2020-01-01 PROCEDURE — 97161 PT EVAL LOW COMPLEX 20 MIN: CPT

## 2020-01-01 PROCEDURE — 99239 HOSP IP/OBS DSCHRG MGMT >30: CPT | Performed by: INTERNAL MEDICINE

## 2020-01-01 PROCEDURE — 700111 HCHG RX REV CODE 636 W/ 250 OVERRIDE (IP): Performed by: ANESTHESIOLOGY

## 2020-01-01 PROCEDURE — 85610 PROTHROMBIN TIME: CPT

## 2020-01-01 PROCEDURE — 97535 SELF CARE MNGMENT TRAINING: CPT

## 2020-01-01 PROCEDURE — 97535 SELF CARE MNGMENT TRAINING: CPT | Mod: CO

## 2020-01-01 PROCEDURE — 99285 EMERGENCY DEPT VISIT HI MDM: CPT

## 2020-01-01 PROCEDURE — 99232 SBSQ HOSP IP/OBS MODERATE 35: CPT | Performed by: INTERNAL MEDICINE

## 2020-01-01 PROCEDURE — 306015 LOCK STAT FOLEY: Performed by: STUDENT IN AN ORGANIZED HEALTH CARE EDUCATION/TRAINING PROGRAM

## 2020-01-01 PROCEDURE — 97530 THERAPEUTIC ACTIVITIES: CPT

## 2020-01-01 PROCEDURE — 700105 HCHG RX REV CODE 258: Performed by: ANESTHESIOLOGY

## 2020-01-01 PROCEDURE — 93005 ELECTROCARDIOGRAM TRACING: CPT | Performed by: EMERGENCY MEDICINE

## 2020-01-01 PROCEDURE — 80048 BASIC METABOLIC PNL TOTAL CA: CPT

## 2020-01-01 PROCEDURE — 700101 HCHG RX REV CODE 250: Performed by: INTERNAL MEDICINE

## 2020-01-01 PROCEDURE — 99222 1ST HOSP IP/OBS MODERATE 55: CPT | Mod: AI | Performed by: INTERNAL MEDICINE

## 2020-01-01 PROCEDURE — 99232 SBSQ HOSP IP/OBS MODERATE 35: CPT | Performed by: HOSPITALIST

## 2020-01-01 DEVICE — IMPLANTABLE DEVICE: Type: IMPLANTABLE DEVICE | Site: HIP | Status: FUNCTIONAL

## 2020-01-01 DEVICE — SCREW KIT INTERTAN LAG 100/95 - COMPRESSION: Type: IMPLANTABLE DEVICE | Site: HIP | Status: FUNCTIONAL

## 2020-01-01 DEVICE — SCREW TRIGEN LOW PROFILE 5.0MM X 45MM: Type: IMPLANTABLE DEVICE | Site: HIP | Status: FUNCTIONAL

## 2020-01-01 RX ORDER — ONDANSETRON 2 MG/ML
4 INJECTION INTRAMUSCULAR; INTRAVENOUS
Status: DISCONTINUED | OUTPATIENT
Start: 2020-01-01 | End: 2020-01-01 | Stop reason: HOSPADM

## 2020-01-01 RX ORDER — DIPHENHYDRAMINE HYDROCHLORIDE 50 MG/ML
12.5 INJECTION INTRAMUSCULAR; INTRAVENOUS
Status: DISCONTINUED | OUTPATIENT
Start: 2020-01-01 | End: 2020-01-01 | Stop reason: HOSPADM

## 2020-01-01 RX ORDER — FAMOTIDINE 20 MG/1
20 TABLET, FILM COATED ORAL 2 TIMES DAILY
COMMUNITY

## 2020-01-01 RX ORDER — OXYCODONE HYDROCHLORIDE 5 MG/1
2.5 TABLET ORAL
Qty: 10 TAB | Refills: 0 | Status: SHIPPED | OUTPATIENT
Start: 2020-01-01 | End: 2020-01-01

## 2020-01-01 RX ORDER — POTASSIUM CHLORIDE 20 MEQ/1
40 TABLET, EXTENDED RELEASE ORAL ONCE
Status: COMPLETED | OUTPATIENT
Start: 2020-01-01 | End: 2020-01-01

## 2020-01-01 RX ORDER — METOPROLOL SUCCINATE 25 MG/1
12.5 TABLET, EXTENDED RELEASE ORAL DAILY
Status: DISCONTINUED | OUTPATIENT
Start: 2020-01-01 | End: 2020-01-01 | Stop reason: HOSPADM

## 2020-01-01 RX ORDER — CEFAZOLIN SODIUM 1 G/3ML
INJECTION, POWDER, FOR SOLUTION INTRAMUSCULAR; INTRAVENOUS PRN
Status: DISCONTINUED | OUTPATIENT
Start: 2020-01-01 | End: 2020-01-01 | Stop reason: SURG

## 2020-01-01 RX ORDER — ACETAMINOPHEN 500 MG
500 TABLET ORAL EVERY 6 HOURS PRN
Status: DISCONTINUED | OUTPATIENT
Start: 2020-01-01 | End: 2020-01-01

## 2020-01-01 RX ORDER — POTASSIUM CHLORIDE 20 MEQ/1
40 TABLET, EXTENDED RELEASE ORAL ONCE
Status: DISCONTINUED | OUTPATIENT
Start: 2020-01-01 | End: 2020-01-01

## 2020-01-01 RX ORDER — PROCHLORPERAZINE EDISYLATE 5 MG/ML
10 INJECTION INTRAMUSCULAR; INTRAVENOUS EVERY 6 HOURS PRN
Status: DISCONTINUED | OUTPATIENT
Start: 2020-01-01 | End: 2020-01-01 | Stop reason: HOSPADM

## 2020-01-01 RX ORDER — POLYETHYLENE GLYCOL 3350 17 G/17G
1 POWDER, FOR SOLUTION ORAL
Status: DISCONTINUED | OUTPATIENT
Start: 2020-01-01 | End: 2020-01-01 | Stop reason: HOSPADM

## 2020-01-01 RX ORDER — SODIUM CHLORIDE 9 MG/ML
INJECTION, SOLUTION INTRAVENOUS CONTINUOUS
Status: DISCONTINUED | OUTPATIENT
Start: 2020-01-01 | End: 2020-01-01

## 2020-01-01 RX ORDER — OXYCODONE HCL 5 MG/5 ML
5 SOLUTION, ORAL ORAL
Status: DISCONTINUED | OUTPATIENT
Start: 2020-01-01 | End: 2020-01-01 | Stop reason: HOSPADM

## 2020-01-01 RX ORDER — BISACODYL 10 MG
10 SUPPOSITORY, RECTAL RECTAL
Status: DISCONTINUED | OUTPATIENT
Start: 2020-01-01 | End: 2020-01-01 | Stop reason: HOSPADM

## 2020-01-01 RX ORDER — HYDROMORPHONE HYDROCHLORIDE 1 MG/ML
0.25 INJECTION, SOLUTION INTRAMUSCULAR; INTRAVENOUS; SUBCUTANEOUS
Status: DISCONTINUED | OUTPATIENT
Start: 2020-01-01 | End: 2020-01-01

## 2020-01-01 RX ORDER — KETOROLAC TROMETHAMINE 30 MG/ML
INJECTION, SOLUTION INTRAMUSCULAR; INTRAVENOUS PRN
Status: DISCONTINUED | OUTPATIENT
Start: 2020-01-01 | End: 2020-01-01 | Stop reason: SURG

## 2020-01-01 RX ORDER — ACETAMINOPHEN 325 MG/1
650 TABLET ORAL EVERY 6 HOURS PRN
Status: DISCONTINUED | OUTPATIENT
Start: 2020-01-01 | End: 2020-01-01 | Stop reason: HOSPADM

## 2020-01-01 RX ORDER — AMOXICILLIN 250 MG
2 CAPSULE ORAL 2 TIMES DAILY
Qty: 30 TAB | Refills: 0 | Status: SHIPPED
Start: 2020-01-01

## 2020-01-01 RX ORDER — KETOROLAC TROMETHAMINE 30 MG/ML
15 INJECTION, SOLUTION INTRAMUSCULAR; INTRAVENOUS EVERY 6 HOURS PRN
Status: DISPENSED | OUTPATIENT
Start: 2020-01-01 | End: 2020-01-01

## 2020-01-01 RX ORDER — OXYCODONE HCL 5 MG/5 ML
10 SOLUTION, ORAL ORAL
Status: DISCONTINUED | OUTPATIENT
Start: 2020-01-01 | End: 2020-01-01 | Stop reason: HOSPADM

## 2020-01-01 RX ORDER — QUETIAPINE FUMARATE 25 MG/1
25 TABLET, FILM COATED ORAL EVERY 6 HOURS PRN
COMMUNITY

## 2020-01-01 RX ORDER — ONDANSETRON 4 MG/1
4 TABLET, ORALLY DISINTEGRATING ORAL EVERY 4 HOURS PRN
Status: DISCONTINUED | OUTPATIENT
Start: 2020-01-01 | End: 2020-01-01 | Stop reason: HOSPADM

## 2020-01-01 RX ORDER — LORAZEPAM 0.5 MG/1
0.25 TABLET ORAL ONCE
Status: COMPLETED | OUTPATIENT
Start: 2020-01-01 | End: 2020-01-01

## 2020-01-01 RX ORDER — ONDANSETRON 2 MG/ML
4 INJECTION INTRAMUSCULAR; INTRAVENOUS EVERY 4 HOURS PRN
Status: DISCONTINUED | OUTPATIENT
Start: 2020-01-01 | End: 2020-01-01 | Stop reason: HOSPADM

## 2020-01-01 RX ORDER — HALOPERIDOL 5 MG/ML
1 INJECTION INTRAMUSCULAR
Status: DISCONTINUED | OUTPATIENT
Start: 2020-01-01 | End: 2020-01-01 | Stop reason: HOSPADM

## 2020-01-01 RX ORDER — OXYCODONE HYDROCHLORIDE 5 MG/1
2.5 TABLET ORAL
Qty: 10 TAB | Refills: 0 | Status: SHIPPED | OUTPATIENT
Start: 2020-01-01 | End: 2020-01-01 | Stop reason: SDUPTHER

## 2020-01-01 RX ORDER — CEFAZOLIN SODIUM 1 G/50ML
1 INJECTION, SOLUTION INTRAVENOUS EVERY 8 HOURS
Status: COMPLETED | OUTPATIENT
Start: 2020-01-01 | End: 2020-01-01

## 2020-01-01 RX ORDER — SODIUM CHLORIDE AND POTASSIUM CHLORIDE 300; 900 MG/100ML; MG/100ML
INJECTION, SOLUTION INTRAVENOUS CONTINUOUS
Status: DISCONTINUED | OUTPATIENT
Start: 2020-01-01 | End: 2020-01-01

## 2020-01-01 RX ORDER — OXYCODONE HYDROCHLORIDE 5 MG/1
2.5 TABLET ORAL
Status: DISCONTINUED | OUTPATIENT
Start: 2020-01-01 | End: 2020-01-01 | Stop reason: HOSPADM

## 2020-01-01 RX ORDER — KETOROLAC TROMETHAMINE 30 MG/ML
15 INJECTION, SOLUTION INTRAMUSCULAR; INTRAVENOUS EVERY 12 HOURS PRN
Status: DISCONTINUED | OUTPATIENT
Start: 2020-01-01 | End: 2020-01-01

## 2020-01-01 RX ORDER — SODIUM CHLORIDE, SODIUM LACTATE, POTASSIUM CHLORIDE, CALCIUM CHLORIDE 600; 310; 30; 20 MG/100ML; MG/100ML; MG/100ML; MG/100ML
INJECTION, SOLUTION INTRAVENOUS
Status: DISCONTINUED | OUTPATIENT
Start: 2020-01-01 | End: 2020-01-01 | Stop reason: SURG

## 2020-01-01 RX ORDER — AMOXICILLIN 250 MG
2 CAPSULE ORAL 2 TIMES DAILY
Status: DISCONTINUED | OUTPATIENT
Start: 2020-01-01 | End: 2020-01-01 | Stop reason: HOSPADM

## 2020-01-01 RX ORDER — ERYTHROMYCIN 5 MG/G
OINTMENT OPHTHALMIC 4 TIMES DAILY
Status: DISCONTINUED | OUTPATIENT
Start: 2020-01-01 | End: 2020-01-01 | Stop reason: HOSPADM

## 2020-01-01 RX ADMIN — DOCUSATE SODIUM 50 MG AND SENNOSIDES 8.6 MG 2 TABLET: 8.6; 5 TABLET, FILM COATED ORAL at 17:11

## 2020-01-01 RX ADMIN — ERYTHROMYCIN: 5 OINTMENT OPHTHALMIC at 13:08

## 2020-01-01 RX ADMIN — ERYTHROMYCIN: 5 OINTMENT OPHTHALMIC at 13:06

## 2020-01-01 RX ADMIN — METOPROLOL SUCCINATE 12.5 MG: 25 TABLET, EXTENDED RELEASE ORAL at 05:28

## 2020-01-01 RX ADMIN — ACETAMINOPHEN 650 MG: 325 TABLET, FILM COATED ORAL at 08:41

## 2020-01-01 RX ADMIN — ERYTHROMYCIN: 5 OINTMENT OPHTHALMIC at 09:25

## 2020-01-01 RX ADMIN — ERYTHROMYCIN: 5 OINTMENT OPHTHALMIC at 16:50

## 2020-01-01 RX ADMIN — HYDROMORPHONE HYDROCHLORIDE 0.25 MG: 1 INJECTION, SOLUTION INTRAMUSCULAR; INTRAVENOUS; SUBCUTANEOUS at 21:30

## 2020-01-01 RX ADMIN — OXYCODONE HYDROCHLORIDE 2.5 MG: 5 TABLET ORAL at 16:49

## 2020-01-01 RX ADMIN — ACETAMINOPHEN 500 MG: 500 TABLET ORAL at 22:37

## 2020-01-01 RX ADMIN — ONDANSETRON 4 MG: 2 INJECTION INTRAMUSCULAR; INTRAVENOUS at 09:30

## 2020-01-01 RX ADMIN — ERYTHROMYCIN: 5 OINTMENT OPHTHALMIC at 13:00

## 2020-01-01 RX ADMIN — ERYTHROMYCIN: 5 OINTMENT OPHTHALMIC at 08:37

## 2020-01-01 RX ADMIN — OXYCODONE HYDROCHLORIDE 2.5 MG: 5 TABLET ORAL at 05:06

## 2020-01-01 RX ADMIN — ACETAMINOPHEN 650 MG: 325 TABLET, FILM COATED ORAL at 05:31

## 2020-01-01 RX ADMIN — ERYTHROMYCIN: 5 OINTMENT OPHTHALMIC at 21:00

## 2020-01-01 RX ADMIN — OXYCODONE HYDROCHLORIDE 2.5 MG: 5 TABLET ORAL at 17:49

## 2020-01-01 RX ADMIN — DOCUSATE SODIUM 50 MG AND SENNOSIDES 8.6 MG 2 TABLET: 8.6; 5 TABLET, FILM COATED ORAL at 06:04

## 2020-01-01 RX ADMIN — METOPROLOL SUCCINATE 12.5 MG: 25 TABLET, EXTENDED RELEASE ORAL at 05:02

## 2020-01-01 RX ADMIN — ERYTHROMYCIN: 5 OINTMENT OPHTHALMIC at 17:13

## 2020-01-01 RX ADMIN — ERYTHROMYCIN: 5 OINTMENT OPHTHALMIC at 09:00

## 2020-01-01 RX ADMIN — ERYTHROMYCIN: 5 OINTMENT OPHTHALMIC at 17:50

## 2020-01-01 RX ADMIN — ACETAMINOPHEN 650 MG: 325 TABLET, FILM COATED ORAL at 16:50

## 2020-01-01 RX ADMIN — DOCUSATE SODIUM 50 MG AND SENNOSIDES 8.6 MG 2 TABLET: 8.6; 5 TABLET, FILM COATED ORAL at 05:24

## 2020-01-01 RX ADMIN — OXYCODONE HYDROCHLORIDE 2.5 MG: 5 TABLET ORAL at 13:08

## 2020-01-01 RX ADMIN — OXYCODONE HYDROCHLORIDE 2.5 MG: 5 TABLET ORAL at 23:31

## 2020-01-01 RX ADMIN — ENOXAPARIN SODIUM 40 MG: 40 INJECTION SUBCUTANEOUS at 06:00

## 2020-01-01 RX ADMIN — OXYCODONE HYDROCHLORIDE 2.5 MG: 5 TABLET ORAL at 05:22

## 2020-01-01 RX ADMIN — ERYTHROMYCIN: 5 OINTMENT OPHTHALMIC at 13:09

## 2020-01-01 RX ADMIN — SODIUM CHLORIDE, POTASSIUM CHLORIDE, SODIUM LACTATE AND CALCIUM CHLORIDE: 600; 310; 30; 20 INJECTION, SOLUTION INTRAVENOUS at 08:30

## 2020-01-01 RX ADMIN — ERYTHROMYCIN: 5 OINTMENT OPHTHALMIC at 08:41

## 2020-01-01 RX ADMIN — ERYTHROMYCIN: 5 OINTMENT OPHTHALMIC at 15:29

## 2020-01-01 RX ADMIN — OXYCODONE HYDROCHLORIDE 2.5 MG: 5 TABLET ORAL at 21:44

## 2020-01-01 RX ADMIN — ERYTHROMYCIN: 5 OINTMENT OPHTHALMIC at 21:36

## 2020-01-01 RX ADMIN — ENOXAPARIN SODIUM 40 MG: 40 INJECTION SUBCUTANEOUS at 05:02

## 2020-01-01 RX ADMIN — METOPROLOL SUCCINATE 12.5 MG: 25 TABLET, EXTENDED RELEASE ORAL at 06:05

## 2020-01-01 RX ADMIN — DOCUSATE SODIUM 50 MG AND SENNOSIDES 8.6 MG 2 TABLET: 8.6; 5 TABLET, FILM COATED ORAL at 17:01

## 2020-01-01 RX ADMIN — LORAZEPAM 0.25 MG: 0.5 TABLET ORAL at 13:08

## 2020-01-01 RX ADMIN — OXYCODONE HYDROCHLORIDE 2.5 MG: 5 TABLET ORAL at 06:05

## 2020-01-01 RX ADMIN — ERYTHROMYCIN: 5 OINTMENT OPHTHALMIC at 13:43

## 2020-01-01 RX ADMIN — DOCUSATE SODIUM 50 MG AND SENNOSIDES 8.6 MG 2 TABLET: 8.6; 5 TABLET, FILM COATED ORAL at 16:50

## 2020-01-01 RX ADMIN — ERYTHROMYCIN: 5 OINTMENT OPHTHALMIC at 20:18

## 2020-01-01 RX ADMIN — POTASSIUM CHLORIDE AND SODIUM CHLORIDE: 900; 300 INJECTION, SOLUTION INTRAVENOUS at 22:34

## 2020-01-01 RX ADMIN — ONDANSETRON 4 MG: 2 INJECTION INTRAMUSCULAR; INTRAVENOUS at 16:57

## 2020-01-01 RX ADMIN — ENOXAPARIN SODIUM 40 MG: 40 INJECTION SUBCUTANEOUS at 05:05

## 2020-01-01 RX ADMIN — OXYCODONE HYDROCHLORIDE 2.5 MG: 5 TABLET ORAL at 11:46

## 2020-01-01 RX ADMIN — ERYTHROMYCIN: 5 OINTMENT OPHTHALMIC at 21:22

## 2020-01-01 RX ADMIN — ERYTHROMYCIN: 5 OINTMENT OPHTHALMIC at 17:01

## 2020-01-01 RX ADMIN — POTASSIUM CHLORIDE 40 MEQ: 1500 TABLET, EXTENDED RELEASE ORAL at 12:31

## 2020-01-01 RX ADMIN — ENOXAPARIN SODIUM 40 MG: 40 INJECTION SUBCUTANEOUS at 06:05

## 2020-01-01 RX ADMIN — DOCUSATE SODIUM 50 MG AND SENNOSIDES 8.6 MG 2 TABLET: 8.6; 5 TABLET, FILM COATED ORAL at 05:22

## 2020-01-01 RX ADMIN — ERYTHROMYCIN: 5 OINTMENT OPHTHALMIC at 08:39

## 2020-01-01 RX ADMIN — METOPROLOL SUCCINATE 12.5 MG: 25 TABLET, EXTENDED RELEASE ORAL at 05:22

## 2020-01-01 RX ADMIN — ACETAMINOPHEN 650 MG: 325 TABLET, FILM COATED ORAL at 21:44

## 2020-01-01 RX ADMIN — METOPROLOL SUCCINATE 12.5 MG: 25 TABLET, EXTENDED RELEASE ORAL at 05:24

## 2020-01-01 RX ADMIN — HYDROMORPHONE HYDROCHLORIDE 0.25 MG: 1 INJECTION, SOLUTION INTRAMUSCULAR; INTRAVENOUS; SUBCUTANEOUS at 01:31

## 2020-01-01 RX ADMIN — OXYCODONE HYDROCHLORIDE 2.5 MG: 5 TABLET ORAL at 09:33

## 2020-01-01 RX ADMIN — EPHEDRINE SULFATE 5 MG: 50 INJECTION INTRAMUSCULAR; INTRAVENOUS; SUBCUTANEOUS at 09:28

## 2020-01-01 RX ADMIN — ERYTHROMYCIN: 5 OINTMENT OPHTHALMIC at 16:49

## 2020-01-01 RX ADMIN — OXYCODONE HYDROCHLORIDE 2.5 MG: 5 TABLET ORAL at 17:01

## 2020-01-01 RX ADMIN — CEFAZOLIN 1 G: 330 INJECTION, POWDER, FOR SOLUTION INTRAMUSCULAR; INTRAVENOUS at 08:30

## 2020-01-01 RX ADMIN — CEFAZOLIN SODIUM 1 G: 1 INJECTION, SOLUTION INTRAVENOUS at 16:50

## 2020-01-01 RX ADMIN — ERYTHROMYCIN: 5 OINTMENT OPHTHALMIC at 12:00

## 2020-01-01 RX ADMIN — ERYTHROMYCIN: 5 OINTMENT OPHTHALMIC at 20:13

## 2020-01-01 RX ADMIN — ACETAMINOPHEN 650 MG: 325 TABLET, FILM COATED ORAL at 21:22

## 2020-01-01 RX ADMIN — KETOROLAC TROMETHAMINE 15 MG: 30 INJECTION, SOLUTION INTRAMUSCULAR at 22:42

## 2020-01-01 RX ADMIN — ENOXAPARIN SODIUM 40 MG: 40 INJECTION SUBCUTANEOUS at 05:28

## 2020-01-01 RX ADMIN — OXYCODONE HYDROCHLORIDE 2.5 MG: 5 TABLET ORAL at 15:31

## 2020-01-01 RX ADMIN — DOCUSATE SODIUM 50 MG AND SENNOSIDES 8.6 MG 2 TABLET: 8.6; 5 TABLET, FILM COATED ORAL at 05:06

## 2020-01-01 RX ADMIN — PROCHLORPERAZINE EDISYLATE 10 MG: 5 INJECTION INTRAMUSCULAR; INTRAVENOUS at 22:38

## 2020-01-01 RX ADMIN — OXYCODONE HYDROCHLORIDE 2.5 MG: 5 TABLET ORAL at 13:47

## 2020-01-01 RX ADMIN — CEFAZOLIN SODIUM 1 G: 1 INJECTION, SOLUTION INTRAVENOUS at 08:41

## 2020-01-01 RX ADMIN — POTASSIUM CHLORIDE 40 MEQ: 1500 TABLET, EXTENDED RELEASE ORAL at 09:33

## 2020-01-01 RX ADMIN — METOPROLOL SUCCINATE 12.5 MG: 25 TABLET, EXTENDED RELEASE ORAL at 05:06

## 2020-01-01 RX ADMIN — ERYTHROMYCIN: 5 OINTMENT OPHTHALMIC at 20:52

## 2020-01-01 RX ADMIN — DOCUSATE SODIUM 50 MG AND SENNOSIDES 8.6 MG 2 TABLET: 8.6; 5 TABLET, FILM COATED ORAL at 17:49

## 2020-01-01 RX ADMIN — ONDANSETRON 4 MG: 2 INJECTION INTRAMUSCULAR; INTRAVENOUS at 21:25

## 2020-01-01 RX ADMIN — POTASSIUM CHLORIDE 40 MEQ: 1500 TABLET, EXTENDED RELEASE ORAL at 14:10

## 2020-01-01 RX ADMIN — METOPROLOL SUCCINATE 12.5 MG: 25 TABLET, EXTENDED RELEASE ORAL at 05:05

## 2020-01-01 RX ADMIN — PROPOFOL 100 MG: 10 INJECTION, EMULSION INTRAVENOUS at 08:38

## 2020-01-01 RX ADMIN — ACETAMINOPHEN 500 MG: 500 TABLET ORAL at 01:15

## 2020-01-01 RX ADMIN — CEFAZOLIN SODIUM 1 G: 1 INJECTION, SOLUTION INTRAVENOUS at 01:07

## 2020-01-01 RX ADMIN — KETOROLAC TROMETHAMINE 15 MG: 30 INJECTION, SOLUTION INTRAMUSCULAR at 15:25

## 2020-01-01 RX ADMIN — ERYTHROMYCIN: 5 OINTMENT OPHTHALMIC at 07:50

## 2020-01-01 RX ADMIN — ACETAMINOPHEN 500 MG: 500 TABLET ORAL at 01:08

## 2020-01-01 RX ADMIN — ENOXAPARIN SODIUM 40 MG: 40 INJECTION SUBCUTANEOUS at 05:07

## 2020-01-01 RX ADMIN — ENOXAPARIN SODIUM 40 MG: 40 INJECTION SUBCUTANEOUS at 05:24

## 2020-01-01 RX ADMIN — ACETAMINOPHEN 500 MG: 500 TABLET ORAL at 05:08

## 2020-01-01 RX ADMIN — OXYCODONE HYDROCHLORIDE 2.5 MG: 5 TABLET ORAL at 01:08

## 2020-01-01 RX ADMIN — ERYTHROMYCIN: 5 OINTMENT OPHTHALMIC at 08:24

## 2020-01-01 RX ADMIN — KETOROLAC TROMETHAMINE 15 MG: 30 INJECTION, SOLUTION INTRAMUSCULAR at 09:30

## 2020-01-01 RX ADMIN — HYDROMORPHONE HYDROCHLORIDE 0.25 MG: 1 INJECTION, SOLUTION INTRAMUSCULAR; INTRAVENOUS; SUBCUTANEOUS at 06:04

## 2020-01-01 RX ADMIN — ERYTHROMYCIN: 5 OINTMENT OPHTHALMIC at 20:03

## 2020-01-01 RX ADMIN — ACETAMINOPHEN 500 MG: 500 TABLET ORAL at 11:46

## 2020-01-01 RX ADMIN — OXYCODONE HYDROCHLORIDE 2.5 MG: 5 TABLET ORAL at 20:07

## 2020-01-01 ASSESSMENT — COGNITIVE AND FUNCTIONAL STATUS - GENERAL
MOBILITY SCORE: 6
MOVING TO AND FROM BED TO CHAIR: A LOT
TOILETING: A LOT
SUGGESTED CMS G CODE MODIFIER DAILY ACTIVITY: CK
SUGGESTED CMS G CODE MODIFIER MOBILITY: CL
SUGGESTED CMS G CODE MODIFIER MOBILITY: CL
HELP NEEDED FOR BATHING: A LOT
WALKING IN HOSPITAL ROOM: TOTAL
WALKING IN HOSPITAL ROOM: A LOT
MOBILITY SCORE: 11
WALKING IN HOSPITAL ROOM: A LOT
DAILY ACTIVITIY SCORE: 17
PERSONAL GROOMING: A LITTLE
SUGGESTED CMS G CODE MODIFIER DAILY ACTIVITY: CK
TOILETING: A LOT
MOVING TO AND FROM BED TO CHAIR: UNABLE
CLIMB 3 TO 5 STEPS WITH RAILING: A LOT
DRESSING REGULAR UPPER BODY CLOTHING: A LITTLE
STANDING UP FROM CHAIR USING ARMS: TOTAL
DRESSING REGULAR UPPER BODY CLOTHING: A LITTLE
DRESSING REGULAR LOWER BODY CLOTHING: A LOT
DAILY ACTIVITIY SCORE: 14
PERSONAL GROOMING: A LITTLE
HELP NEEDED FOR BATHING: A LITTLE
TURNING FROM BACK TO SIDE WHILE IN FLAT BAD: A LOT
TURNING FROM BACK TO SIDE WHILE IN FLAT BAD: UNABLE
TURNING FROM BACK TO SIDE WHILE IN FLAT BAD: A LOT
MOVING TO AND FROM BED TO CHAIR: A LOT
HELP NEEDED FOR BATHING: A LOT
DAILY ACTIVITIY SCORE: 15
TOILETING: A LOT
DRESSING REGULAR LOWER BODY CLOTHING: A LOT
DAILY ACTIVITIY SCORE: 15
SUGGESTED CMS G CODE MODIFIER DAILY ACTIVITY: CK
CLIMB 3 TO 5 STEPS WITH RAILING: TOTAL
CLIMB 3 TO 5 STEPS WITH RAILING: TOTAL
EATING MEALS: A LITTLE
MOVING FROM LYING ON BACK TO SITTING ON SIDE OF FLAT BED: UNABLE
PERSONAL GROOMING: A LITTLE
DRESSING REGULAR UPPER BODY CLOTHING: A LITTLE
PERSONAL GROOMING: A LITTLE
DRESSING REGULAR UPPER BODY CLOTHING: A LOT
HELP NEEDED FOR BATHING: A LOT
TOILETING: A LOT
MOBILITY SCORE: 12
SUGGESTED CMS G CODE MODIFIER MOBILITY: CN
DRESSING REGULAR LOWER BODY CLOTHING: A LOT
MOVING FROM LYING ON BACK TO SITTING ON SIDE OF FLAT BED: A LOT
STANDING UP FROM CHAIR USING ARMS: A LOT
DRESSING REGULAR LOWER BODY CLOTHING: A LOT
EATING MEALS: A LITTLE
STANDING UP FROM CHAIR USING ARMS: A LOT
EATING MEALS: A LITTLE
MOVING FROM LYING ON BACK TO SITTING ON SIDE OF FLAT BED: A LOT
SUGGESTED CMS G CODE MODIFIER DAILY ACTIVITY: CK

## 2020-01-01 ASSESSMENT — ENCOUNTER SYMPTOMS
VOMITING: 0
VOMITING: 0
SHORTNESS OF BREATH: 0
SHORTNESS OF BREATH: 0
COUGH: 0
WHEEZING: 0
SHORTNESS OF BREATH: 0
CONSTIPATION: 0
DIARRHEA: 0
CONSTIPATION: 0
HEADACHES: 0
PHOTOPHOBIA: 0
CHILLS: 0
FEVER: 0
EYE PAIN: 0
DOUBLE VISION: 0
DIZZINESS: 0
CONSTIPATION: 0
CHILLS: 0
CHILLS: 0
COUGH: 0
DIARRHEA: 0
CHILLS: 0
WHEEZING: 0
FEVER: 0
EYE PAIN: 0
COUGH: 0
HEADACHES: 0
HEADACHES: 0
DIARRHEA: 0
HEADACHES: 0
FEVER: 0
NAUSEA: 0
VOMITING: 0
FEVER: 0
WHEEZING: 0
COUGH: 0
NAUSEA: 0
NAUSEA: 0
ABDOMINAL PAIN: 0
EYES NEGATIVE: 1
PHOTOPHOBIA: 0
NAUSEA: 0

## 2020-01-01 ASSESSMENT — PATIENT HEALTH QUESTIONNAIRE - PHQ9
2. FEELING DOWN, DEPRESSED, IRRITABLE, OR HOPELESS: NOT AT ALL
SUM OF ALL RESPONSES TO PHQ9 QUESTIONS 1 AND 2: 0
1. LITTLE INTEREST OR PLEASURE IN DOING THINGS: NOT AT ALL
2. FEELING DOWN, DEPRESSED, IRRITABLE, OR HOPELESS: NOT AT ALL
SUM OF ALL RESPONSES TO PHQ9 QUESTIONS 1 AND 2: 0
1. LITTLE INTEREST OR PLEASURE IN DOING THINGS: NOT AT ALL

## 2020-01-01 ASSESSMENT — PAIN DESCRIPTION - PAIN TYPE
TYPE: SURGICAL PAIN
TYPE: ACUTE PAIN
TYPE: SURGICAL PAIN
TYPE: SURGICAL PAIN
TYPE: ACUTE PAIN
TYPE: ACUTE PAIN
TYPE: SURGICAL PAIN
TYPE: ACUTE PAIN
TYPE: ACUTE PAIN
TYPE: ACUTE PAIN;SURGICAL PAIN
TYPE: SURGICAL PAIN
TYPE: ACUTE PAIN
TYPE: SURGICAL PAIN
TYPE: ACUTE PAIN
TYPE: SURGICAL PAIN
TYPE: ACUTE PAIN;CHRONIC PAIN;SURGICAL PAIN
TYPE: SURGICAL PAIN;ACUTE PAIN
TYPE: SURGICAL PAIN
TYPE: ACUTE PAIN
TYPE: SURGICAL PAIN

## 2020-01-01 ASSESSMENT — FIBROSIS 4 INDEX: FIB4 SCORE: 4.42

## 2020-01-01 ASSESSMENT — GAIT ASSESSMENTS
GAIT LEVEL OF ASSIST: REFUSED
GAIT LEVEL OF ASSIST: UNABLE TO PARTICIPATE

## 2020-01-01 ASSESSMENT — ACTIVITIES OF DAILY LIVING (ADL): TOILETING: INDEPENDENT

## 2020-10-12 NOTE — TELEPHONE ENCOUNTER
I spoke with daughter who is coming in next week to see Kerry Lugo and Dr. Moon (Tuesday).  She would like to hold off on changes in medication until then.  
Notes recorded by HARVINDER Hartley on 10/24/2018 at 2:28 PM PDT  Labs are all OK/stable, except TSH, which is mildly elevated.     Suggest starting Synthroid 25mcg once daily.    Repeat TSH in 1 month. Otherwise, same meds.    Thanks, AB  
OK, sounds good.  AB  
NAV (acute kidney injury)

## 2020-11-25 PROBLEM — S72.001A CLOSED RIGHT HIP FRACTURE (HCC): Status: ACTIVE | Noted: 2020-01-01

## 2020-11-26 PROBLEM — R33.9 URINARY RETENTION: Status: ACTIVE | Noted: 2020-01-01

## 2020-11-26 NOTE — PROGRESS NOTES
Patient with c/o left eye irration feels like something is stuck in her eye, attempted to cleanse with saline but feels unchanged. Upon exam denies double vision, states her eye feels blurry and feels she needs to keep closing it I d/w John CLAROS requests notifying OR desk and having them notify anaesthesia call was placed. Received telephone order for antibiotic ointment see patient orders.      Ambulated patient to bathroom with CNA patient had one episode of dry heaving made it to toilet where she felt faint passed gas then we transferred her back to bed in wheel chair because she felt faint.   /54   Pulse 70   Temp 36.4 °C (97.5 °F) (Temporal)   Resp 15   Ht 1.524 m (5')   Wt 63.5 kg (140 lb)   SpO2 95%   Vitals were taken back in bed stable.  Comfortable and pleasant.  Notified John CLAROS continue vital signs as per protocol.     Of note her upmost surgical dressing is saturated with blood after ambulation, d/w John CLAROS okay to change dressing.

## 2020-11-26 NOTE — PROGRESS NOTES
To pre op area with transporter, NOC RN gave CHG bath and started pre op checklist, patient is npo overnight as per report.  On 2L NC, has sommer catheter in place, emptied drainage bag prior to transport.  Family friend at bedside took patients jewelry 2 rings and earning possibly others with her.

## 2020-11-26 NOTE — PROGRESS NOTES
Back in bed, bed alarm and waffle mattress applied.  Comfortable, euphoric currently.  Care giver at bedside left shortly after with patient belongings.  IS taught pulls 1250 max thus far.  Oxygen switched to NC now on 2L 94%.  Call bell in reach,  SCDs applied.

## 2020-11-26 NOTE — H&P
Hospital Medicine History & Physical Note    Date of Service  11/25/2020    Primary Care Physician  HARVINDER Browne    Consultants  Orthopedic surgery    Code Status  DNAR/DNI    Chief Complaint  Chief Complaint   Patient presents with   • GLF     BIB remsa from [facility]       History of Presenting Illness  89 y.o. female with history of breast cancer status post mastectomy, paroxysmal A. fib, osteoporosis, dementia, hypertension who was brought from [facility] after patient tripped, fell and started having right hip pain.  X-ray showed right intertrochanteric fracture with angulation.  Patient is poor historian due to dementia.    Review of Systems  Review of Systems   Unable to perform ROS: Dementia       Past Medical History   has a past medical history of Bell's palsy, Chronic anticoagulation, Colon polyps, Dementia, Diarrhea of infectious origin, GERD (gastroesophageal reflux disease), GI bleed (03/2018), HTN (hypertension), Hyperlipidemia, Memory impairment, Osteoporosis, unspecified, and Paroxysmal atrial fibrillation (HCC) (01/2014).    Surgical History   has a past surgical history that includes appendectomy; inguinal hernia repair; vaginal hysterectomy total; other orthopedic surgery; and lumpectomy.     Family History  family history includes Cancer in her sister.     Social History   reports that she has never smoked. She has never used smokeless tobacco. She reports current alcohol use. She reports that she does not use drugs.    Allergies  Allergies   Allergen Reactions   • Actonel [Risedronate Sodium]      Leg pain   • Amoxicillin    • Morphine Sulfate      Patient became A Fib   • Naproxen      unknown rxn; doctor said not to take   • Vicodin [Hydrocodone-Acetaminophen]      Unknown rxn; doctor said not to take       Medications  Prior to Admission Medications   Prescriptions Last Dose Informant Patient Reported? Taking?   amLODIPine (NORVASC) 2.5 MG Tab   Yes No   Sig: Take 2.5  mg by mouth every day.   atorvastatin (LIPITOR) 40 MG Tab   No No   Sig: Take 1 Tab by mouth every bedtime.   metoprolol SR (TOPROL XL) 25 MG TABLET SR 24 HR   No No   Sig: Take 0.5 Tabs by mouth every bedtime.      Facility-Administered Medications: None       Physical Exam  Temp:  [37.6 °C (99.6 °F)] 37.6 °C (99.6 °F)  Pulse:  [69-71] 71  Resp:  [18] 18  BP: (129-133)/(62-65) 129/62  SpO2:  [91 %-96 %] 91 %    Physical Exam  Vitals signs and nursing note reviewed.   Constitutional:       General: She is not in acute distress.     Appearance: Normal appearance.   HENT:      Head: Normocephalic and atraumatic.      Nose: Nose normal.      Mouth/Throat:      Mouth: Mucous membranes are moist.   Eyes:      Extraocular Movements: Extraocular movements intact.      Pupils: Pupils are equal, round, and reactive to light.   Neck:      Musculoskeletal: Normal range of motion and neck supple.   Cardiovascular:      Rate and Rhythm: Normal rate and regular rhythm.   Pulmonary:      Effort: Pulmonary effort is normal.      Breath sounds: Normal breath sounds.   Abdominal:      General: Abdomen is flat. There is no distension.      Tenderness: There is no abdominal tenderness.   Musculoskeletal:         General: Tenderness and deformity present. No swelling.      Comments: Right hip   Skin:     General: Skin is warm and dry.   Neurological:      General: No focal deficit present.      Mental Status: She is alert. Mental status is at baseline. She is disoriented.   Psychiatric:         Mood and Affect: Mood normal.         Behavior: Behavior normal.         Cognition and Memory: Cognition is impaired. Memory is impaired.         Laboratory:  Recent Labs     11/25/20  1720   WBC 9.1   RBC 3.96*   HEMOGLOBIN 12.9   HEMATOCRIT 39.0   MCV 98.5*   MCH 32.6   MCHC 33.1*   RDW 47.6   PLATELETCT 146*   MPV 10.1     Recent Labs     11/25/20  1720   SODIUM 143   POTASSIUM 3.4*   CHLORIDE 112   CO2 22   GLUCOSE 96   BUN 17   CREATININE  0.65   CALCIUM 8.4*     Recent Labs     11/25/20  1720   ALTSGPT 17   ASTSGOT 17   ALKPHOSPHAT 75   TBILIRUBIN 0.7   GLUCOSE 96         No results for input(s): NTPROBNP in the last 72 hours.      No results for input(s): TROPONINT in the last 72 hours.    Imaging:  DX-HIP-COMPLETE - UNILATERAL 2+ RIGHT   Final Result      Right intertrochanteric fracture with angulation      DX-FEMUR-2+ RIGHT   Final Result      Right intertrochanteric fracture with angulation      DX-CHEST-PORTABLE (1 VIEW)   Final Result      No acute cardiopulmonary abnormality identified.            Assessment/Plan:  I anticipate this patient will require at least two midnights for appropriate medical management, necessitating inpatient admission.    Closed right hip fracture (HCC)  Assessment & Plan  - Admit to orthopedic floor  - Currently hemodynamically stable  - Ortho Dr Lopez consulted  - Will keep patient n.p.o. after midnight  - Pain control with oxycodone and IV morphine PRN  - PT/OT afterwards  EKG and INR will be checked    Late onset Alzheimer's disease with behavioral disturbance (HCC)- (present on admission)  Assessment & Plan  Haldol as needed for agitation    HTN (hypertension)- (present on admission)  Assessment & Plan  Continue metoprolol for heart rate control  We will hold amlodipine due to potential hypovolemia    Paroxysmal atrial fibrillation (HCC)- (present on admission)  Assessment & Plan  Heart rate is under control  EKG ordered.

## 2020-11-26 NOTE — PROGRESS NOTES
Right IT hip fx  Plan IMN  Discussed with patient and her daughter (POA)  Risks discussed  Right leg marked  Consent signed  Consult dictated

## 2020-11-26 NOTE — CONSULTS
DATE OF SERVICE:  11/26/2020    REQUESTING PHYSICIANS:  1.  Jeb Lynn MD  2.  Dr. Granda.    CHIEF COMPLAINT:  Right hip pain.    HISTORY OF PRESENT ILLNESS:  The patient is 89 years old.  She lives in   assisted living.  She has some dementia, fell yesterday when she was walking   and injured her right hip.  She had pain, could not bear weight, was seen in   the emergency room, found to have a proximal femur fracture.  Orthopedic   consultation was requested.    She has pain with any movement.  It is better with rest and severe when it   occurs.    ALLERGIES:  ACTONEL, AMOXICILLIN, MORPHINE, NAPROSYN, AND VICODIN.    MEDICATIONS:  Amlodipine, atorvastatin, metoprolol.    PAST MEDICAL HISTORY:  Dementia, GERD, hyperlipidemia, paroxysmal atrial   fibrillation, osteoporosis, history of Bell's palsy, breast cancer, and   hypertension.    PAST SURGICAL HISTORY:  Appendectomy, inguinal hernia repair, hysterectomy,   lumpectomy.    SOCIAL HISTORY:  The patient lives in assisted living.  She does not smoke.    She has occasional alcohol use.  Does not use any drugs.  Her daughter is her   durable power of  and lives in [place].    FAMILY HISTORY:  Positive for cancer in her sister.    REVIEW OF SYSTEMS:  No loss of consciousness, nausea, vomiting, diarrhea,   constipation, polyuria, dysuria, fevers, chills, weight loss, weight gain,   abdominal pain, chest pain or shortness of breath.    PHYSICAL EXAMINATION:  GENERAL:  Patient is in no acute distress, lying in her bed.  VITAL SIGNS:  Blood pressure 122/63, heart rate 63, respirations 16,   temperature 99.  HEENT:  Normocephalic, atraumatic.  NECK:  Supple, nontender.  CHEST AND ABDOMEN:  Nontender.  No labored breathing.  EXTREMITIES:  Left lower and bilateral upper extremities without tenderness or   deformity.  Right lower extremity is slightly shortened and externally   rotated.  Skin over the right hip is intact.  She is able to dorsiflex and    plantarflex her toes.    LABORATORY DATA:  Include white blood cell count of 8000, hematocrit 36.4%,   platelet count 134,000.  Sodium 142, potassium 4.0, creatinine 0.58.  AST and   ALT are normal.  Albumin 2.7.    RADIOGRAPHS:  Radiographs of the right hip show a displaced intertrochanteric   femur fracture.    ASSESSMENT:  Right intertrochanteric femur fracture.    PLAN:  I recommended intramedullary nailing.  I discussed this with the   patient's daughter by phone.  I discussed the risks as well.  Alternative   would be bed rest, which would have additional complications related to that.    She wished to proceed.  Plan for surgery later this morning.       ____________________________________     MD JAIRO FLORES / HALEIGH    DD:  11/26/2020 07:38:31  DT:  11/26/2020 09:15:03    D#:  [id number]  Job#:  [id number]

## 2020-11-26 NOTE — ASSESSMENT & PLAN NOTE
Continue metoprolol for heart rate control  will resume amlodipine if patient develops hypertension again but patient may not need this as she is currently normotensive off of it

## 2020-11-26 NOTE — ASSESSMENT & PLAN NOTE
S/p intramedullary nailing of right hip on 11/26/2020.  Ortho following  Mobilize with therapy.  Weightbearing as tolerated with a walker  Pain control with oxycodone and toradol PRN  PT/OT recommends postacute placement  SNF referral  SW on

## 2020-11-26 NOTE — ED PROVIDER NOTES
"ED Provider Note    Scribed for Dr. Jeb Lynn M.D. by Zuleyka Khalil. 11/25/2020  4:52 PM    Primary care provider: HARVINDER Browne  Means of arrival: EMS  History obtained from: Patient  History limited by: None    CHIEF COMPLAINT  Chief Complaint   Patient presents with   • GLF     JOSE vela from [facility]  Fabien Linares is a 89 y.o. female with a history of osteoporosis and hypertension who presents to the Emergency Department via ambulance for a ground level fall onset today. The patient states she was walking around at [facility], a [facility] assisted living facility, when she tripped and fell. EMS says that the the fall was unwitnessed, but the staff eventually found the patient on her knees. When the staff tried to bring the patient back up to her feet they heard a \"pop\" and the patient began to complain of right leg pain. Patient denies losing consciousness or hitting her head during the event. EMS was then called and brought the patient into the [facility] ED this evening to be further evaluated. No alleviating factors were noted, and pain is exacerbated by palpating the right hip. Patient has associated right leg pain and right hip pain, but denies fever, chills, or shortness of breath. She also denies coming into contact with anyone who has tested positive for Covid-19 or taking any recent travels outside of the country. Patient drinks alcohol, but does not smoke or use drugs. She is allergic to Morphine and does Toprol. Her PCP is Dr. Lugo.     REVIEW OF SYSTEMS  Pertinent positives include ground level fall, right leg pain, and right hip pain. Pertinent negatives include no fever, chills, or shortness of breath. As above, all other systems reviewed and are negative.   See HPI for further details.     PAST MEDICAL HISTORY   has a past medical history of Bell's palsy, Chronic anticoagulation, Colon polyps, Dementia, Diarrhea of infectious origin, GERD (gastroesophageal reflux " disease), GI bleed (03/2018), HTN (hypertension), Hyperlipidemia, Memory impairment, Osteoporosis, unspecified, and Paroxysmal atrial fibrillation (HCC) (01/2014).    SURGICAL HISTORY   has a past surgical history that includes appendectomy; inguinal hernia repair; vaginal hysterectomy total; other orthopedic surgery; and lumpectomy.    SOCIAL HISTORY  Social History     Tobacco Use   • Smoking status: Never Smoker   • Smokeless tobacco: Never Used   Substance Use Topics   • Alcohol use: Yes     Alcohol/week: 0.0 oz     Comment: occ wine   • Drug use: No      Social History     Substance and Sexual Activity   Drug Use No       FAMILY HISTORY  Family History   Problem Relation Age of Onset   • Cancer Sister        CURRENT MEDICATIONS  Home Medications     Reviewed by Giselle Lewis R.N. (Registered Nurse) on 11/25/20 at 1649  Med List Status: Not Addressed   Medication Last Dose Status   amLODIPine (NORVASC) 2.5 MG Tab  Active   atorvastatin (LIPITOR) 40 MG Tab  Active   metoprolol SR (TOPROL XL) 25 MG TABLET SR 24 HR  Active                ALLERGIES  Allergies   Allergen Reactions   • Actonel [Risedronate Sodium]      Leg pain   • Amoxicillin    • Morphine Sulfate      Patient became A Fib   • Naproxen      unknown rxn; doctor said not to take   • Vicodin [Hydrocodone-Acetaminophen]      Unknown rxn; doctor said not to take       PHYSICAL EXAM  VITAL SIGNS: Pulse 69   Ht 1.524 m (5')   Wt 63.5 kg (140 lb)   SpO2 96%   BMI 27.34 kg/m²     Constitutional: Chronically confused, Well developed, Well nourished, Mild distress, Non-toxic appearance.   HENT: Normocephalic, Atraumatic, Bilateral external ears normal, Oropharynx moist, No oral exudates.   Eyes: PERRLA, EOMI, Conjunctiva normal, No discharge.   Neck: No tenderness, Supple, No stridor.   Lymphatic: No lymphadenopathy noted.   Cardiovascular: Normal heart rate, Normal rhythm.   Thorax & Lungs: Clear to auscultation bilaterally, No respiratory distress, No  wheezing, No crackles.   Abdomen: Soft, No tenderness, No masses, No pulsatile masses.   Skin: Warm, Dry, No erythema, No rash.   Extremities:, No edema No cyanosis.   Musculoskeletal: Pain with palpation and movement to the right hip, No shortening of rotation to the right hip, No major deformities noted.  Intact distal pulses  Neurologic: Awake, alert. Moves all extremities spontaneously.  Psychiatric: Affect normal, Judgment normal, Mood normal.     LABS  Results for orders placed or performed during the hospital encounter of 11/25/20   CBC WITH DIFFERENTIAL   Result Value Ref Range    WBC 9.1 4.8 - 10.8 K/uL    RBC 3.96 (L) 4.20 - 5.40 M/uL    Hemoglobin 12.9 12.0 - 16.0 g/dL    Hematocrit 39.0 37.0 - 47.0 %    MCV 98.5 (H) 81.4 - 97.8 fL    MCH 32.6 27.0 - 33.0 pg    MCHC 33.1 (L) 33.6 - 35.0 g/dL    RDW 47.6 35.9 - 50.0 fL    Platelet Count 146 (L) 164 - 446 K/uL    MPV 10.1 9.0 - 12.9 fL    Neutrophils-Polys 82.20 (H) 44.00 - 72.00 %    Lymphocytes 8.60 (L) 22.00 - 41.00 %    Monocytes 7.80 0.00 - 13.40 %    Eosinophils 0.60 0.00 - 6.90 %    Basophils 0.20 0.00 - 1.80 %    Immature Granulocytes 0.60 0.00 - 0.90 %    Nucleated RBC 0.00 /100 WBC    Neutrophils (Absolute) 7.44 (H) 2.00 - 7.15 K/uL    Lymphs (Absolute) 0.78 (L) 1.00 - 4.80 K/uL    Monos (Absolute) 0.71 0.00 - 0.85 K/uL    Eos (Absolute) 0.05 0.00 - 0.51 K/uL    Baso (Absolute) 0.02 0.00 - 0.12 K/uL    Immature Granulocytes (abs) 0.05 0.00 - 0.11 K/uL    NRBC (Absolute) 0.00 K/uL   COMP METABOLIC PANEL   Result Value Ref Range    Sodium 143 135 - 145 mmol/L    Potassium 3.4 (L) 3.6 - 5.5 mmol/L    Chloride 112 96 - 112 mmol/L    Co2 22 20 - 33 mmol/L    Anion Gap 9.0 7.0 - 16.0    Glucose 96 65 - 99 mg/dL    Bun 17 8 - 22 mg/dL    Creatinine 0.65 0.50 - 1.40 mg/dL    Calcium 8.4 (L) 8.5 - 10.5 mg/dL    AST(SGOT) 17 12 - 45 U/L    ALT(SGPT) 17 2 - 50 U/L    Alkaline Phosphatase 75 30 - 99 U/L    Total Bilirubin 0.7 0.1 - 1.5 mg/dL    Albumin 3.5  3.2 - 4.9 g/dL    Total Protein 5.2 (L) 6.0 - 8.2 g/dL    Globulin 1.7 (L) 1.9 - 3.5 g/dL    A-G Ratio 2.1 g/dL   ESTIMATED GFR   Result Value Ref Range    GFR If African American >60 >60 mL/min/1.73 m 2    GFR If Non African American >60 >60 mL/min/1.73 m 2      All labs reviewed by me.    EKG  Interpreted by me    RADIOLOGY  DX-HIP-COMPLETE - UNILATERAL 2+ RIGHT   Final Result      Right intertrochanteric fracture with angulation      DX-FEMUR-2+ RIGHT   Final Result      Right intertrochanteric fracture with angulation      DX-CHEST-PORTABLE (1 VIEW)   Final Result      No acute cardiopulmonary abnormality identified.        The radiologist's interpretation of all radiological studies have been reviewed by me.    COURSE & MEDICAL DECISION MAKING  Pertinent Labs & Imaging studies reviewed. (See chart for details)    4:52 PM - Patient seen and examined at bedside. Discussed plan of care with patient. I informed them that labs and imaging will be ordered to evaluate symptoms. The patient is understanding and agreeable with plan of care. Ordered DX-Chest-Portable (1 View), DX-Femur-2+ Right, DX-Hip-Complete-Unilateral 2+ Right, Estimated GFR, CBC w/ Differential, CMP, and UA to evaluate her symptoms. The differential diagnoses include but are not limited to: Fractured pelvis, Fractured hip, Contusion of the hip     6:20 PM Paged Ortho     6:35 PM I discussed the patient's case and the above findings with Dr. Ceja (Ortho) who agrees to the plan of care and agrees to consult with the patient.    6:36 PM Patient was reevaluated at bedside. Discussed lab and radiology results with the patient and informed them that there were abnormal findings as noted above. The plan of care was discussed with the patient. She was informed of the discussion with Dr. Ceja. Patient is understanding and agreeable to the plan of care. The patient had the opportunity to ask any questions. The plan for hospitalization was discussed with  the patient due to their current condition. Patient is understanding and agreeable to the plan for hospitalization.      7:15 pm  I discussed the patient's case and the above findings with  (Hospitalist) who agrees with the plan of care and who agrees to hospitalize the patient due to her current condition.     PPE Note: I verified that the patient was wearing a mask and I was wearing appropriate PPE every time I entered the room. The patient's mask was on the patient at all times during my encounter except for a brief view of the oropharynx.    Decision Making:  The patient with the acute right intertrochanteric hip fracture.  Discussed with hospitalist and orthopedist as noted above planned operative repair tomorrow with admission by hospitalist    DISPOSITION:  Patient will be hospitalized by Dr. quezada in guarded condition.     FINAL IMPRESSION  1. Closed fracture of right hip, initial encounter ([facility])          uZleyka CHILD (Scribe), am scribing for, and in the presence of, Jeb Lynn M.D..    Electronically signed by: Zuleyka Khalil (Rakan), 11/25/2020    Jeb CHILD M.D. personally performed the services described in this documentation, as scribed by Zuleyka Khalil in my presence, and it is both accurate and complete. C    The note accurately reflects work and decisions made by me.  Jeb Lynn M.D.  11/25/2020  7:20 PM

## 2020-11-26 NOTE — ED TRIAGE NOTES
"Chief Complaint   Patient presents with   • GLF     BIB [facility] from [facility]     Pt had an unwitnessed fall. Staff found her on her knees and heard a \"pop\". No LOC. Pt denies hitting her head or taking blood thinners. Pt allergic to opiates, EMS administered ketamine and resolved her pain.    /65   Pulse 69   Temp 37.6 °C (99.6 °F) (Temporal)   Resp 18   Ht 1.524 m (5')   Wt 63.5 kg (140 lb)   SpO2 96%   BMI 27.34 kg/m²     "

## 2020-11-26 NOTE — ED NOTES
Med Rec complete per Pt's MAR from [facility]  Pt unable to participate in interview    No ABX in the last 14 days

## 2020-11-26 NOTE — ANESTHESIA PROCEDURE NOTES
Airway    Date/Time: 11/26/2020 8:38 AM  Performed by: Yg Khalil M.D.  Authorized by: Yg Khalil M.D.     Location:  OR  Urgency:  Elective  Indications for Airway Management:  Anesthesia      Spontaneous Ventilation: absent    Sedation Level:  Deep  Preoxygenated: Yes    Final Airway Type:  Supraglottic airway  Final Supraglottic Airway:  Standard LMA    SGA Size:  3  Number of Attempts at Approach:  1

## 2020-11-26 NOTE — CARE PLAN
Problem: Pain Management  Goal: Pain level will decrease to patient's comfort goal  Outcome: PROGRESSING AS EXPECTED  Pain controlled with PRN medication overnight, for surgery this morning.      Problem: Infection  Goal: Will remain free from infection  Outcome: PROGRESSING AS EXPECTED   Afebrile overnight.

## 2020-11-26 NOTE — OR NURSING
Pt arrived to podium 16A, respirations even and unlabored, pt awakens easily with calling her name, VSS on 8l simple mask oxygen.   1050 Updated daughter Christiana on pt status, POA is here waiting in waiting room upstairs, Christiana will update POBEVERLY.   1055 Pt opening eyes spontaneously, taking sips of water and able to hold conversation with me.  VSS on 4l oxymask oxygen. Dressings to Right lateral leg x3 with gauze and tegaderm in place.  Middle dressing has small amount of serosang drainage noted, icepack place.  DP1+, <3sec cap refill to right toes, +CMS. Pt denies any pain at this time.   1108 Report to Ian  112Constantin Called and updated daughter Christiana on pt status and transport called for pt to go back to room, she will update Anali RODRIGUEZ.  1129 Pt given additional sips of water prior to departure to floor via transport.

## 2020-11-26 NOTE — OP REPORT
DATE OF SERVICE:  11/26/2020    PREOPERATIVE DIAGNOSES:  1.  Right trochanteric femur fracture.  2.  Dementia.    POSTOPERATIVE DIAGNOSES:  1.  Right trochanteric femur fracture.  2.  Dementia.    SURGICAL PROCEDURE:  Intramedullary nailing, right hip.    SURGEON:  Drew Ceja MD    ASSISTANTS:  1.  Brandan Laura DO  2.  Flakita Meraz MS-3    ANESTHESIA:  Yg Khalil MD    ANESTHETIC:  General.    ESTIMATED BLOOD LOSS:  100 mL    INDICATIONS:  The patient is 89 years old.  She has some dementia and lives in   assisted living memory care facility.  She fell yesterday, injuring her right   hip, was seen in the emergency room, found to have a proximal femur fracture,   recommended intramedullary nailing.  I have discussed this with the patient's   daughter who is her durable power of , makes her health related   decisions.  She wishes to proceed.  Risks were discussed as well.  These   include bleeding, infection, neurovascular injury, pain, stiffness, arthritis,   nonunion, malunion, fracture, thromboembolic phenomena, etc.    DESCRIPTION OF PROCEDURE:  The  patient was identified in the preoperative   holding area.  Her right leg was marked.  She was taken to the operating room   where general anesthetic was administered.  Intravenous antibiotics were   given.  She was placed supine on the fracture table.  Feet were placed in the   boots.  Traction and internal rotation were applied to the right lower   extremity.  Fluoroscopic images showed restoration of alignment.  The lateral   hip, thigh, and knee was then prepped and draped in sterile fashion.  Time-out   was held to confirm the patient identity and correct surgical site.    After reducing the fracture, it became apparent that the fracture was through   the base of the neck as well as involving the greater trochanter separately   with some comminution.  We placed a terminally threaded guidewire to the tip   of the greater trochanter and then  inserted this into the proximal femur in   line with the canal.  The incision was made around the wire after we checked   the wire position.  Then, the starter reamer was placed over the wire down to   the level of lesser trochanter.  We passed the ball tip guidewire down the   medullary canal to the distal femur, measured length and then passed a 12 mm   reamer easily by hand.  I then inserted an Intertan (380 mm x 10 mm x 125   degrees) to the appropriate depth.  An incision was made laterally through   which we placed interlocking sleeve and then placed a guidewire into the near   center-center position of the femoral head.  We then drilled for the   compression screw, inserted the derotation bar and then used the cannulated   drill over the guidewire.  The 100 mm lag screw was inserted followed by the   compression screw and a small amount of compression was applied.  The setscrew   was tightened.  The jig was removed.  Fluoroscopic images proximally showed   good reduction and implant placement.  The nail was then locked distally with   a single static interlocking screw after stab incision and predrilling.    Wounds were irrigated.  Deep layer was closed with 2 proximal incision with 0   Vicryl.  Skin was closed with 2-0 Vicryl and staples.  Dressings were applied.    The patient was taken off the fracture table, extubated, and taken to   recovery room in stable condition.    POSTOPERATIVE PLAN:  1.  Intravenous antibiotics for 24 hours.  2.  DVT prophylaxis with SCDs, Lovenox, and mobilization.  3.  Weightbearing as tolerated.  4.  Ongoing perioperative medical management per hospitalist.       ____________________________________     MD JAIRO FLORES / HALEIGH    DD:  11/26/2020 10:23:32  DT:  11/26/2020 11:02:32    D#:  [id number]  Job#:  [id number]

## 2020-11-26 NOTE — ED NOTES
Pt's daughter who is POA called for updates. She would like to be contacted before any surgery takes place. Passed on to TED Carmona on T3.

## 2020-11-26 NOTE — ANESTHESIA TIME REPORT
Anesthesia Start and Stop Event Times     Date Time Event    11/26/2020 0826 Ready for Procedure     0830 Anesthesia Start     0942 Anesthesia Stop        Responsible Staff  11/26/20    Name Role Begin End    Yg Khalil M.D. Anesth 0830 0942        Preop Diagnosis (Free Text):  Pre-op Diagnosis     RIGHT INTERTROCHANTERIC  HIP FRACTURE        Preop Diagnosis (Codes):    Post op Diagnosis  Closed intertrochanteric fracture of right hip (HCC)      Premium Reason  F. Holiday    Comments:

## 2020-11-26 NOTE — PROGRESS NOTES
Patient has not been able to urinate since being admitted to the floor. Bladder scan ordered and showed patient has 615 mL in bladder. On-call hospitalist, Sonia Gonzales, notified and order for sommer placement received.

## 2020-11-26 NOTE — ANESTHESIA POSTPROCEDURE EVALUATION
Patient: Fabien Linares    Procedure Summary     Date: 11/26/20 Room / Location: [place] [address] / SURGERY [facility]    Anesthesia Start: 0830 Anesthesia Stop: 0942    Procedure: INSERTION, INTRAMEDULLARY SHAHLA, FEMUR, PROXIMAL (Right Hip) Diagnosis: (RIGHT INTERTROCHANTERIC HIP FRACTURE)    Surgeons: Drew Ceja M.D. Responsible Provider: Yg Khalil M.D.    Anesthesia Type: general ASA Status: 3          Final Anesthesia Type: general  Last vitals  BP   Blood Pressure : 137/69    Temp   37.2 °C (99 °F)    Pulse   Pulse: (!) 57   Resp   14    SpO2   (!) 85 %      Anesthesia Post Evaluation    Patient location during evaluation: PACU  Patient participation: complete - patient participated  Level of consciousness: awake and alert    Airway patency: patent  Anesthetic complications: no  Cardiovascular status: hemodynamically stable  Respiratory status: acceptable  Hydration status: euvolemic    PONV: none           Nurse Pain Score: 5 (NPRS)

## 2020-11-26 NOTE — ANESTHESIA PREPROCEDURE EVALUATION
Relevant Problems   CARDIAC   (+) HTN (hypertension)   (+) Paroxysmal atrial fibrillation (HCC)      GI   (+) GERD (gastroesophageal reflux disease)   R hip fracture    Physical Exam    Airway   Mallampati: III  TM distance: <3 FB  Neck ROM: full       Cardiovascular   Rhythm: regular  Rate: normal     Dental    Pulmonary   Breath sounds clear to auscultation     Abdominal    Neurological              Anesthesia Plan    ASA 3       Plan - general       Airway plan will be LMA        Induction: intravenous      Pertinent diagnostic labs and testing reviewed    Informed Consent:    Anesthetic plan and risks discussed with healthcare power of .

## 2020-11-26 NOTE — ANESTHESIA QCDR
2019 [facility] Clinical Data Registry (for Quality Improvement)     Postoperative nausea/vomiting risk protocol (Adult = 18 yrs and Pediatric 3-17 yrs)- (430 and 463)  General inhalation anesthetic (NOT TIVA) with PONV risk factors: No  Provision of anti-emetic therapy with at least 2 different classes of agents: N/A  Patient DID NOT receive anti-emetic therapy and reason is documented in Medical Record: N/A    Multimodal Pain Management- (477)  Non-emergent surgery AND patient age >= 18: Yes  Use of Multimodal Pain Management, two or more drugs and/or interventions, NOT including systemic opioids: Yes  Exception: Documented allergy to multiple classes of analgesics: N/A    Smoking Abstinence (404)  Patient is current smoker (cigarette, pipe, e-cig, marijuanna): No  Elective Surgery:   Abstinence instructions provided prior to day of surgery:   Patient abstained from smoking on day of surgery:     Pre-Op Beta-Blocker in Isolated CABG (44)  Isolated CABG AND patient age >= 18: No  Beta-blocker admin within 24 hours of surgical incision:   Exception:of medical reason(s) for not administering beta blocker within 24 hours prior to surgical incision (e.g., not  indicated,other medical reason):     PACU assessment of acute postoperative pain prior to Anesthesia Care End- Applies to Patients Age = 18- (ABG7)  Initial PACU pain score is which of the following: < 7/10  Patient unable to report pain score: N/A    Post-anesthetic transfer of care checklist/protocol to PACU/ICU- (426 and 427)  Upon conclusion of case, patient transferred to which of the following locations: PACU/Non-ICU  Use of transfer checklist/protocol: Yes  Exclusion: Service Performed in Patient Hospital Room (and thus did not require transfer): N/A  Unplanned admission to ICU related to anesthesia service up through end of PACU care- (MD51)  Unplanned admission to ICU (not initially anticipated at anesthesia start time): No

## 2020-11-26 NOTE — PROGRESS NOTES
[facility] Medicine Daily Progress Note    Date of Service  11/26/2020    Chief Complaint  89 y.o. female admitted 11/25/2020 with right hip pain post fall    Hospital Course  A 89-year-old woman nursing home resident ([facility]) with dementia, h/o breast cancer, s/p mastectomy, paroxysmal Afib, osteoporosis, HTN presented with right hip pain after mechanical fall.  XR showed right intertrochanteric fracture with angulation. S/p intramedullary nailing of right hip on 11/26/2020.    Interval Problem Update  Patient underwent intramedullary nailing of right hip today 11/26/2020.  Reports mild pain at surgery site. Patient is demented, poor historian.  Afebrile, hemodynamically stable  Developed urinary retention, Montgomery placed  UA unremarkable  COVID 19 negative      Consultants/Specialty  Orthopedic Surgery    Code Status  DNAR/DNI    Disposition  TBD    Review of Systems  Review of Systems   Unable to perform ROS: Medical condition        Physical Exam  Temp:  [36.2 °C (97.2 °F)-37.6 °C (99.6 °F)] 36.2 °C (97.2 °F)  Pulse:  [51-81] 62  Resp:  [12-20] 15  BP: (119-157)/(57-97) 120/57  SpO2:  [91 %-100 %] 94 %    Physical Exam  Vitals signs and nursing note reviewed.   Constitutional:       Comments: Frail, thin-build   HENT:      Head: Normocephalic.      Nose: Nose normal.      Mouth/Throat:      Mouth: Mucous membranes are moist.      Pharynx: Oropharynx is clear.   Eyes:      Pupils: Pupils are equal, round, and reactive to light.   Neck:      Musculoskeletal: Normal range of motion.   Cardiovascular:      Rate and Rhythm: Normal rate.      Heart sounds: Normal heart sounds.   Pulmonary:      Effort: Pulmonary effort is normal. No respiratory distress.   Abdominal:      General: Abdomen is flat. Bowel sounds are normal.      Tenderness: There is no abdominal tenderness.   Musculoskeletal: Normal range of motion.         General: Tenderness (rigth hip) present.   Skin:     General: Skin is warm.   Neurological:       General: No focal deficit present.      Mental Status: She is alert. She is disoriented.         Fluids    Intake/Output Summary (Last 24 hours) at 11/26/2020 1311  Last data filed at 11/26/2020 0935  Gross per 24 hour   Intake 500 ml   Output 0 ml   Net 500 ml       Laboratory  Recent Labs     11/25/20  1720 11/26/20  0626   WBC 9.1 8.0   RBC 3.96* 3.66*   HEMOGLOBIN 12.9 11.8*   HEMATOCRIT 39.0 36.4*   MCV 98.5* 99.5*   MCH 32.6 32.2   MCHC 33.1* 32.4*   RDW 47.6 47.2   PLATELETCT 146* 134*   MPV 10.1 10.0     Recent Labs     11/25/20  1720 11/26/20  0626   SODIUM 143 142   POTASSIUM 3.4* 4.0   CHLORIDE 112 110   CO2 22 23   GLUCOSE 96 119*   BUN 17 17   CREATININE 0.65 0.58   CALCIUM 8.4* 9.0     Recent Labs     11/26/20  0626   INR 1.08               Imaging  DX-PORTABLE FLUORO > 1 HOUR   Final Result      Intraoperative fluoroscopic spot images as described above.      DX-FEMUR-2+ RIGHT   Final Result      Intraoperative fluoroscopic spot images as described above.      DX-HIP-COMPLETE - UNILATERAL 2+ RIGHT   Final Result      Right intertrochanteric fracture with angulation      DX-FEMUR-2+ RIGHT   Final Result      Right intertrochanteric fracture with angulation      DX-CHEST-PORTABLE (1 VIEW)   Final Result      No acute cardiopulmonary abnormality identified.           Assessment/Plan  Closed right hip fracture (HCC)  Assessment & Plan  S/p intramedullary nailing of right hip on 11/26/2020.  Ortho following  Pain control with oxycodone and IV morphine PRN  PT/OT     Late onset Alzheimer's disease with behavioral disturbance (HCC)- (present on admission)  Assessment & Plan  Haldol as needed for agitation    HTN (hypertension)- (present on admission)  Assessment & Plan  Continue metoprolol for heart rate control  We will hold amlodipine due to potential hypovolemia    Paroxysmal atrial fibrillation (HCC)- (present on admission)  Assessment & Plan  Heart rate is under control  EKG sinus rhythm    Urinary  retention  Assessment & Plan  Montgomery in place  Will remove Montgomery tomorrow       VTE prophylaxis: lovenox starting 11/27/2020

## 2020-11-27 NOTE — PROGRESS NOTES
Hospital Medicine Daily Progress Note    Date of Service  11/27/2020    Chief Complaint  89 y.o. female admitted 11/25/2020 with right hip pain post fall     Hospital Course  A 89-year-old woman nursing home resident ([facility]) with dementia, h/o breast cancer, s/p mastectomy, paroxysmal Afib, osteoporosis, HTN presented with right hip pain after mechanical fall.  XR showed right intertrochanteric fracture with angulation. S/p intramedullary nailing of right hip on 11/26/2020.  COVID 19 negative      Interval Problem Update  Patient reports pain at surgery site, controlled with medications. Afebrile, hemodynamically stable. Demented, inconsistent history.   Discontinue Montgomery. Trial of void    Consultants/Specialty  Orthopedic Surgery    Code Status  DNAR/DNI    Disposition  PT, OT recommended post-acute placement    Review of Systems  Review of Systems   Constitutional: Negative for chills and fever.   HENT: Negative.    Eyes: Negative.    Respiratory: Negative for cough.    Cardiovascular: Negative for chest pain and leg swelling.   Gastrointestinal: Negative for abdominal pain and nausea.   Genitourinary: Negative for dysuria.   Musculoskeletal: Positive for joint pain (right hip).   Skin: Negative.    Neurological: Negative for headaches.        Physical Exam  Temp:  [36.1 °C (97 °F)-36.9 °C (98.4 °F)] 36.9 °C (98.4 °F)  Pulse:  [62-88] 88  Resp:  [15-16] 15  BP: (104-132)/(53-71) 104/61  SpO2:  [92 %-99 %] 94 %    Physical Exam  Vitals signs reviewed.   Constitutional:       Appearance: She is ill-appearing.   HENT:      Head: Normocephalic.      Nose: Nose normal.      Mouth/Throat:      Mouth: Mucous membranes are moist.      Pharynx: Oropharynx is clear.   Eyes:      Pupils: Pupils are equal, round, and reactive to light.   Neck:      Musculoskeletal: Normal range of motion.   Cardiovascular:      Rate and Rhythm: Normal rate and regular rhythm.      Pulses: Normal pulses.   Pulmonary:      Effort:  Pulmonary effort is normal. No respiratory distress.   Abdominal:      General: Abdomen is flat. There is no distension.      Tenderness: There is no abdominal tenderness.   Musculoskeletal:         General: Tenderness (right hip) present.   Skin:     General: Skin is warm.   Neurological:      General: No focal deficit present.      Mental Status: She is alert. She is disoriented.         Fluids    Intake/Output Summary (Last 24 hours) at 11/27/2020 1311  Last data filed at 11/27/2020 1200  Gross per 24 hour   Intake 120 ml   Output 500 ml   Net -380 ml       Laboratory  Recent Labs     11/25/20  1720 11/26/20  0626 11/27/20  0647   WBC 9.1 8.0 8.6   RBC 3.96* 3.66* 2.89*   HEMOGLOBIN 12.9 11.8* 9.7*   HEMATOCRIT 39.0 36.4* 29.1*   MCV 98.5* 99.5* 100.7*   MCH 32.6 32.2 33.6*   MCHC 33.1* 32.4* 33.3*   RDW 47.6 47.2 48.9   PLATELETCT 146* 134* 101*   MPV 10.1 10.0 10.4     Recent Labs     11/25/20  1720 11/26/20  0626 11/27/20  0647   SODIUM 143 142 138   POTASSIUM 3.4* 4.0 4.0   CHLORIDE 112 110 107   CO2 22 23 22   GLUCOSE 96 119* 117*   BUN 17 17 17   CREATININE 0.65 0.58 0.54   CALCIUM 8.4* 9.0 8.9     Recent Labs     11/26/20  0626   INR 1.08               Imaging  DX-PORTABLE FLUORO > 1 HOUR   Final Result      Intraoperative fluoroscopic spot images as described above.      DX-FEMUR-2+ RIGHT   Final Result      Intraoperative fluoroscopic spot images as described above.      DX-HIP-COMPLETE - UNILATERAL 2+ RIGHT   Final Result      Right intertrochanteric fracture with angulation      DX-FEMUR-2+ RIGHT   Final Result      Right intertrochanteric fracture with angulation      DX-CHEST-PORTABLE (1 VIEW)   Final Result      No acute cardiopulmonary abnormality identified.           Assessment/Plan  Closed right hip fracture (HCC)  Assessment & Plan  S/p intramedullary nailing of right hip on 11/26/2020.  Ortho following  Pain control with oxycodone and IV morphine PRN  PT/OT     Late onset Alzheimer's disease  with behavioral disturbance (HCC)- (present on admission)  Assessment & Plan  Haldol as needed for agitation    HTN (hypertension)- (present on admission)  Assessment & Plan  Continue metoprolol for heart rate control  We will hold amlodipine due to potential hypovolemia    Paroxysmal atrial fibrillation (HCC)- (present on admission)  Assessment & Plan  Heart rate is under control  EKG sinus rhythm    Urinary retention  Assessment & Plan  Remove Montgomery I  Trial of void       VTE prophylaxis: lovenox

## 2020-11-27 NOTE — CARE PLAN
Problem: Pain Management  Goal: Pain level will decrease to patient's comfort goal  Outcome: PROGRESSING AS EXPECTED   Medicated per MAR  Problem: Safety  Goal: Will remain free from injury  Outcome: PROGRESSING AS EXPECTED   Treaded socks in place, bed in the lowest position, bed alarm on, call light and belongings within reach, pt call for assistance appropriately   Problem: Mobility  Goal: Risk for activity intolerance will decrease  Outcome: PROGRESSING AS EXPECTED   Encouraged pt. To get out of bed for meals and try to ambulate to bathroom.

## 2020-11-27 NOTE — PROGRESS NOTES
Orthopaedic PA Progress Note    Interval changes:OT/PT notes read and appreciated, good SNF/Memory care candidate, clear for transfer from Ortho POV    ROS - Patient denies any new issues. No chest pain, dyspnea, or fever.  Pain well controlled.    /61   Pulse 88   Temp 36.9 °C (98.4 °F) (Temporal)   Resp 15   Ht 1.524 m (5')   Wt 63.5 kg (140 lb)   SpO2 94%     Patient seen and examined  No acute distress  Breathing non labored  RRR  Surgical dressing is clean, dry, and intact. Patient clearly fires tibialis anterior, EHL, and gastrocnemius/soleus. Sensation is intact to light touch throughout superficial peroneal, deep peroneal, tibial, saphenous, and sural nerve distributions. Strong and palpable 2+ dorsalis pedis and posterior tibial pulses with capillary refill less than 2 seconds. No lower leg tenderness or discomfort.    Recent Labs     11/25/20  1720 11/26/20  0626 11/27/20  0647   WBC 9.1 8.0 8.6   RBC 3.96* 3.66* 2.89*   HEMOGLOBIN 12.9 11.8* 9.7*   HEMATOCRIT 39.0 36.4* 29.1*   MCV 98.5* 99.5* 100.7*   MCH 32.6 32.2 33.6*   MCHC 33.1* 32.4* 33.3*   RDW 47.6 47.2 48.9   PLATELETCT 146* 134* 101*   MPV 10.1 10.0 10.4       Active Hospital Problems    Diagnosis   • Closed right hip fracture (HCC) [S72.001A]     Priority: High   • Late onset Alzheimer's disease with behavioral disturbance (HCC) [G30.1, F02.81]     Priority: High   • HTN (hypertension) [I10]     Priority: High   • Paroxysmal atrial fibrillation (HCC) [I48.0]     Priority: High     January 2014: Echocardiogram with normal LV size, severe LVH, LVEF 70-75%. Trace MR, mild TR. RVSP 23mmHg.     • Urinary retention [R33.9]       Assessment/Plan:  POD#1 S/P IMN R hip  Wt bearing status - AT  PT/OT-initiated  Wound care:dressing left in place, RN may change PRN if soaked  Drains - no  Montgomery-no  Sutures/Staples out- 10-14 days post operatively  Antibiotics: complete  DVT Prophylaxis- TEDS/SCDs/Foot pumps.   Lovenox: Start 40 mg daily,  Duration-until ambulatory > 150', Transition to ASA at discharge if appropriate  Future Procedures - none planned  Case Coordination for Discharge Planning - Disposition placement per PT/OT Reccs ok from Ortho standpoint, surgically cleared for disposition

## 2020-11-27 NOTE — THERAPY
"Occupational Therapy   Initial Evaluation     Patient Name: Fabien Lianres  Age:  89 y.o., Sex:  female  Medical Record #: [id number]  Today's Date: 11/27/2020     Precautions  Precautions: Fall Risk, Weight Bearing As Tolerated Right Lower Extremity  Comments: s/p IMN R femur    Assessment  Patient is 89 y.o. female with GLF at [facility], s/p IMN R femur. PMH significant for HTN, a-fib, dementia Pt demonstrates increased pain, decreased activity tolerance, decreased balance, decreased strength, decreased cognition, and decreased safety awareness limiting pt's safety/indep with ADLs/mobility. Pt would benefit from skilled OT services in the acute care setting to address these deficits.    Plan    Recommend Occupational Therapy 4 times per week until therapy goals are met for the following treatments:  Adaptive Equipment, Cognitive Skill Development, Community Re-integration, Neuro Re-Education / Balance, Self Care/Activities of Daily Living, Therapeutic Activities and Therapeutic Exercises.    DC Equipment Recommendations: Unable to determine at this time  Discharge Recommendations: Pt currently needs 24/7 support for ADLs/mobility, pt is safe to return to [facility] with HH OT if they are able to provide that level of care. Otherwise, recommend post-acute placement for additional occupational therapy services prior to discharge home.      Subjective    \"I have this pain...?\"     Objective     11/27/20 1040   Prior Living Situation   Prior Services   ([facility] unit per daughter)   Comments unable to determine home set up, pt poor historian   Prior Level of ADL Function   Self Feeding Independent   Grooming / Hygiene Independent   Bathing Independent   Dressing Independent   Toileting Independent   Comments per daughter report   Prior Level of IADL Function   Prior Level Of Mobility Independent Without Device in Home  (per daughter report)   Comments daughter reports memory care " unit provides IADL assist    History of Falls   History of Falls Yes   Date of Last Fall   (this admission)   Cognition    Cognition / Consciousness X   Orientation Level Not Oriented to Reason   Level of Consciousness Confused   Safety Awareness Impaired   New Learning Impaired   Attention Impaired   Sequencing Impaired   Comments Pt anxious, redirectable. Pt requiring reorientation to reason throughout session d/t inquiring about RLE pain. Dx dementia.    Balance Assessment   Sitting Balance (Static) Fair -   Sitting Balance (Dynamic) Poor +   Standing Balance (Static) Fair -   Standing Balance (Dynamic) Poor +   Weight Shift Sitting Poor   Weight Shift Standing Poor   Comments FWW   Bed Mobility    Supine to Sit Maximal Assist   Sit to Supine Maximal Assist   Scooting Maximal Assist   Rolling Moderate Assist to Lt.   ADL Assessment   Upper Body Dressing Moderate Assist  (doff soiled gown/don clean gown)   Lower Body Dressing Maximal Assist  (don L sock)   Toileting Maximal Assist  (clothing management/hygiene`)   Functional Mobility   Sit to Stand Moderate Assist   Bed, Chair, Wheelchair Transfer Moderate Assist   Toilet Transfers Moderate Assist  (BSC)   Transfer Method Stand Step  (FWW)   Mobility bed<>BSC   Edema / Skin Assessment   Comments dressing saturated/oozing, RN informed   Activity Tolerance   Sitting Edge of Bed 15 mins   Standing 3 mins   Patient / Family Goals   Patient / Family Goal #1 none stated   Short Term Goals   Short Term Goal # 1 Pt will complete hygiene/grooming tasks standing at sink at supervision x 5 sessions   Short Term Goal # 2 Pt will complete functional transfers to/from toilet/chair/EOB at supervision x 5 sessions   Short Term Goal # 3 Pt will complete toileting tasks at min A x 5 sessions   Anticipated Discharge Equipment and Recommendations   DC Equipment Recommendations Unable to determine at this time   Discharge Recommendations Recommend post-acute placement for additional  occupational therapy services prior to discharge home

## 2020-11-27 NOTE — CARE PLAN
Problem: Safety  Goal: Will remain free from injury  Outcome: PROGRESSING AS EXPECTED   Bed locked and in lowest position, call light and personal belongings within reach, treaded socks and bed alarm in place, hourly rounding on going.    Problem: Skin Integrity  Goal: Risk for impaired skin integrity will decrease  Outcome: PROGRESSING AS EXPECTED   Waffle cushion in place, q2 turns on going, silicon nasal cannula and ear foams in use, pillows in place for support and comfort.

## 2020-11-27 NOTE — THERAPY
Physical Therapy   Initial Evaluation     Patient Name: Fabien Linares  Age:  89 y.o., Sex:  female  Medical Record #: [id number]  Today's Date: 11/27/2020     Precautions: Fall Risk, Weight Bearing As Tolerated Right Lower Extremity    Assessment  Patient is 89 y.o. female s/p IMN R femur after fall at [facility]. PMH includes dementia, Bell's palsy, Chronic anticoagulation, Colon polyps, Dementia, Diarrhea of infectious origin, GERD, GI bleed, HTN , Hyperlipidemia, Memory impairment, Osteoporosis, and Paroxysmal atrial fibrillation. PTA, patient lives in memory care unit at [facility]. Patient was IND with functional  Mobility and ADLs, required assistance for IADLs. At time of evaluation, patient was limited by pain and anxiety. Patient req maxA for bed mobility, Deanne for STS with FWW and modA for stand step transfer to [facility]. Gait was deferred at this time d/t anxiety, pain and balance impairments. Patient will require 24 hour assistance if discharges back to [facility]. If [facility] is unable to provide this assist level patient will require placement at time of discharge.     Plan    Recommend Physical Therapy 4 times per week until therapy goals are met for the following treatments:  Bed Mobility, Gait Training, Therapeutic Activities and Therapeutic Exercises    DC Equipment Recommendations: Front-Wheel Walker  Discharge Recommendations: Recommend post-acute placement for additional physical therapy services prior to discharge home     Objective       11/27/20 1037   Precautions   Precautions Fall Risk   Comments RLE WBAT   Prior Living Situation   Housing / Facility Assisted Living Residence   Prior Level of Functional Mobility   Bed Mobility Independent   Transfer Status Independent   Ambulation Independent   Assistive Devices Used None   Comments depA IADLs, IND with ADLs   History of Falls   History of Falls Yes   Date of Last Fall 11/26/20   Gait Analysis   Gait Level Of Assist Unable to Participate   Comments  limited d/t pain and balance deficits   Bed Mobility    Supine to Sit Maximal Assist   Sit to Supine Maximal Assist   Scooting Maximal Assist   Rolling Moderate Assist to Lt.   Comments pivot with chux for pain control    Functional Mobility   Sit to Stand Moderate Assist   Bed, Chair, Wheelchair Transfer Moderate Assist   Toilet Transfers Moderate Assist   Transfer Method Stand Step   Mobility scooting, rolling, sup>sit, sit>stand, bed>bsc stand step    Comments increased time and cues for sequencing and weight shifting    Short Term Goals    Short Term Goal # 1 Patient will demonstrate bed mobility sup>sit with Deanne in 6 visits   Short Term Goal # 2 Patient will perform STS with Deanne by 6 visits   Short Term Goal # 3 Patient will ambulate x15' with FWW by 6 visits in order to increase independence   Anticipated Discharge Equipment and Recommendations   DC Equipment Recommendations Front-Wheel Walker

## 2020-11-28 NOTE — PROGRESS NOTES
DATE OF SERVICE:    TIME:  9:15 a.m.    SUBJECTIVE:  The patient is having breakfast.  She looks fine, but does have   pain in her right hip.    OBJECTIVE:  VITAL SIGNS:  Blood pressure 120/50, heart rate 68, respirations 15,   temperature 96.9.  EXTREMITIES:  She is able to dorsiflex and plantarflex her toes.  SCDs in   place.    LABORATORY DATA:  No labs today.    ASSESSMENT:  Right pertrochanteric femur fracture - status post intramedullary   nailing.    PLAN:  Mobilize with therapy.  Weightbearing as tolerated with a walker.  DVT   prophylaxis.  Discharge planning.       ____________________________________     MD JAIRO FLORES / HALEIGH    DD:  11/28/2020 09:42:41  DT:  11/28/2020 11:00:09    D#:  [id number]  Job#:  [id number]

## 2020-11-28 NOTE — CARE PLAN
Problem: Safety  Goal: Will remain free from injury  Outcome: PROGRESSING AS EXPECTED   Bed locked and in lowest position, call light and personal belongings within reach, treaded socks and bed alarm in place, hourly rounding on going.    Problem: Venous Thromboembolism (VTW)/Deep Vein Thrombosis (DVT) Prevention:  Goal: Patient will participate in Venous Thrombosis (VTE)/Deep Vein Thrombosis (DVT)Prevention Measures  Outcome: PROGRESSING AS EXPECTED   Scds in place. Patient receives Lovenox for DVT prophylaxis per MAR.

## 2020-11-28 NOTE — PROGRESS NOTES
DATE OF SERVICE: 11/27/2020    TIME:  Approximately 10:00 a.m.    SUBJECTIVE:  The patient is sitting at the bedside on the bedpan.  She has no   complaints today except for some mild pain in her right hip.  This was better   than we did preop.  I explained what we did surgically for her yesterday.    OBJECTIVE:  VITAL SIGNS:  Blood pressure 104/61, heart rate 88, respirations 15,   temperature 98.4.  EXTREMITIES:  She is able to dorsiflex her toes.    LABORATORY DATA:  Include white blood cell count of 8600, hematocrit 29.1%,   platelet count 101,000.  Sodium 138, potassium 4.0, creatinine 0.54.    ASSESSMENT:  1.  Right pertrochanteric femur fracture.  2.  Acute blood loss anemia.    PLAN:  Mobilize with therapy.  She is weightbearing as tolerated.  She should   use a walker.  DVT prophylaxis with SCDs, mobilization, and Lovenox.    Discharge planning with likely discharge to SNF.  She should have   postoperative dressings changed tomorrow.       ____________________________________     MD JAIRO FLORES / HALEIGH    DD:  11/27/2020 18:16:09  DT:  11/27/2020 19:33:48    D#:  [id number]  Job#:  [id number]

## 2020-11-28 NOTE — PROGRESS NOTES
Spoke with Dr. Patrick about patient's nausea being unrelieved despite being given Zofran. Also informed Dr. Patrick that patient has been reporting increased pain to right hip, which per patient has been unrelieved with current pain medications. Received new orders for IV Compazine and Toradol.

## 2020-11-28 NOTE — PROGRESS NOTES
Hospital Medicine Daily Progress Note    Date of Service  11/28/2020    Chief Complaint  89 y.o. female admitted 11/25/2020 with right hip pain post fall     Hospital Course  A 89-year-old woman nursing home resident ([facility]) with dementia, h/o breast cancer, s/p mastectomy, paroxysmal Afib, osteoporosis, HTN presented with right hip pain after mechanical fall.  XR showed right intertrochanteric fracture with angulation. S/p intramedullary nailing of right hip on 11/26/2020.  COVID 19 negative    Interval Problem Update  Afebrile, hemodynamically stable. Reports pain mild at right hip, controlled with medications.  Orthopedic surgery following, recommendations appreciated.    Consultants/Specialty  Orthopedic Surgery    Code Status  DNAR/DNI    Disposition  PT, OT recommended post-acute placement  Discharge likely to [facility]    Review of Systems  Review of Systems   Constitutional: Negative for chills and fever.   HENT: Negative.    Eyes: Negative.    Respiratory: Negative for cough.    Cardiovascular: Negative for chest pain and leg swelling.   Gastrointestinal: Negative for abdominal pain and nausea.   Genitourinary: Negative for dysuria.   Musculoskeletal: Positive for joint pain (right hip).   Skin: Negative.    Neurological: Negative for headaches.           Physical Exam  Temp:  [36.1 °C (96.9 °F)-36.8 °C (98.2 °F)] 36.1 °C (96.9 °F)  Pulse:  [68-78] 68  Resp:  [15-16] 15  BP: (100-134)/(50-76) 120/50  SpO2:  [93 %-96 %] 96 %    Physical Exam  Vitals signs reviewed.   Constitutional:       Appearance: She is ill-appearing.   HENT:      Head: Normocephalic.      Nose: Nose normal.      Mouth/Throat:      Mouth: Mucous membranes are moist.      Pharynx: Oropharynx is clear.   Eyes:      Pupils: Pupils are equal, round, and reactive to light.   Neck:      Musculoskeletal: Normal range of motion.   Cardiovascular:      Rate and Rhythm: Normal rate and regular rhythm.      Pulses: Normal pulses.   Pulmonary:       Effort: Pulmonary effort is normal. No respiratory distress.   Abdominal:      General: Abdomen is flat. There is no distension.      Tenderness: There is no abdominal tenderness.   Musculoskeletal:         General: Tenderness (right hip) present.   Skin:     General: Skin is warm.   Neurological:      General: No focal deficit present.      Mental Status: She is alert.    Fluids    Intake/Output Summary (Last 24 hours) at 11/28/2020 1313  Last data filed at 11/27/2020 1700  Gross per 24 hour   Intake 120 ml   Output 200 ml   Net -80 ml       Laboratory  Recent Labs     11/25/20  1720 11/26/20  0626 11/27/20  0647   WBC 9.1 8.0 8.6   RBC 3.96* 3.66* 2.89*   HEMOGLOBIN 12.9 11.8* 9.7*   HEMATOCRIT 39.0 36.4* 29.1*   MCV 98.5* 99.5* 100.7*   MCH 32.6 32.2 33.6*   MCHC 33.1* 32.4* 33.3*   RDW 47.6 47.2 48.9   PLATELETCT 146* 134* 101*   MPV 10.1 10.0 10.4     Recent Labs     11/25/20  1720 11/26/20  0626 11/27/20  0647   SODIUM 143 142 138   POTASSIUM 3.4* 4.0 4.0   CHLORIDE 112 110 107   CO2 22 23 22   GLUCOSE 96 119* 117*   BUN 17 17 17   CREATININE 0.65 0.58 0.54   CALCIUM 8.4* 9.0 8.9     Recent Labs     11/26/20  0626   INR 1.08               Imaging  DX-PORTABLE FLUORO > 1 HOUR   Final Result      Intraoperative fluoroscopic spot images as described above.      DX-FEMUR-2+ RIGHT   Final Result      Intraoperative fluoroscopic spot images as described above.      DX-HIP-COMPLETE - UNILATERAL 2+ RIGHT   Final Result      Right intertrochanteric fracture with angulation      DX-FEMUR-2+ RIGHT   Final Result      Right intertrochanteric fracture with angulation      DX-CHEST-PORTABLE (1 VIEW)   Final Result      No acute cardiopulmonary abnormality identified.           Assessment/Plan  * Closed right hip fracture (HCC)  Assessment & Plan  S/p intramedullary nailing of right hip on 11/26/2020.  Ortho following  Mobilize with therapy.  Weightbearing as tolerated with a walker  Pain control with oxycodone and toradol  PRN  PT/OT     Late onset Alzheimer's disease with behavioral disturbance (HCC)- (present on admission)  Assessment & Plan  Frequent orientation    HTN (hypertension)- (present on admission)  Assessment & Plan  Continue metoprolol for heart rate control  will resume amlodipine when BP allows    Paroxysmal atrial fibrillation (HCC)- (present on admission)  Assessment & Plan  Heart rate is under control  Continue metoprolol  EKG sinus rhythm    Urinary retention  Assessment & Plan  Remove Montgomery I  Trial of void       VTE prophylaxis: lovenox

## 2020-11-28 NOTE — PROGRESS NOTES
DATE OF SERVICE:    TIME:  Approximately 10:00 a.m.    SUBJECTIVE:  She has no complaints today except for some mild pain in her right hip.  SARA, denies CP SOB    OBJECTIVE:  Vitals:    11/28/20 0800   BP: 120/50   Pulse: 68   Resp: 15   Temp: 36.1 °C (96.9 °F)   SpO2: 96%     EXTREMITIES:  She is able to dorsiflex her toes.    LABORATORY DATA:    Lab Results   Component Value Date/Time    SODIUM 138 11/27/2020 06:47 AM    POTASSIUM 4.0 11/27/2020 06:47 AM    CHLORIDE 107 11/27/2020 06:47 AM    CO2 22 11/27/2020 06:47 AM    GLUCOSE 117 (H) 11/27/2020 06:47 AM    BUN 17 11/27/2020 06:47 AM    CREATININE 0.54 11/27/2020 06:47 AM    CREATININE 0.9 11/30/2005 02:20 PM      Lab Results   Component Value Date/Time    WBC 8.6 11/27/2020 06:47 AM    RBC 2.89 (L) 11/27/2020 06:47 AM    HEMOGLOBIN 9.7 (L) 11/27/2020 06:47 AM    HEMATOCRIT 29.1 (L) 11/27/2020 06:47 AM    .7 (H) 11/27/2020 06:47 AM    MCH 33.6 (H) 11/27/2020 06:47 AM    MCHC 33.3 (L) 11/27/2020 06:47 AM    MPV 10.4 11/27/2020 06:47 AM    NEUTSPOLYS 77.20 (H) 11/27/2020 06:47 AM    LYMPHOCYTES 12.00 (L) 11/27/2020 06:47 AM    MONOCYTES 9.80 11/27/2020 06:47 AM    EOSINOPHILS 0.40 11/27/2020 06:47 AM    BASOPHILS 0.20 11/27/2020 06:47 AM        ASSESSMENT:  1.  Right pertrochanteric femur fracture.  2.  Acute blood loss anemia.    PLAN:  Mobilize with therapy.  She is weightbearing as tolerated.  She should   use a walker.  DVT prophylaxis with SCDs, mobilization, and Lovenox.    Discharge planning with likely discharge to SNF.  She should have   postoperative dressings changed tomorrow.

## 2020-11-29 NOTE — PROGRESS NOTES
Patient only passing small amounts of urine encouraging po intake and assisting her.   Will repeat bladder scan to determine if having retention.

## 2020-11-29 NOTE — PROGRESS NOTES
Hospital Medicine Daily Progress Note    Date of Service  11/29/2020    Chief Complaint  89 y.o. female admitted 11/25/2020 with right hip pain post fall     Hospital Course  A 89-year-old woman nursing home resident ([facility]) with dementia, h/o breast cancer, s/p mastectomy, paroxysmal Afib, osteoporosis, HTN presented with right hip pain after mechanical fall.  XR showed right intertrochanteric fracture with angulation. S/p intramedullary nailing of right hip on 11/26/2020.  COVID 19 negative    Interval Problem Update  Right hip pain controlled with medications.  Potassium 3.5 replaced  Developed urinary retention, straight cath  Orthopedic surgery following, recommendations appreciated.  WBAT RLE    Consultants/Specialty  Orthopedic surgery    Code Status  DNAR/DNI    Disposition  PT, OT recommended postacute placement  Discharge likely to SNF    Review of Systems  Review of Systems   Constitutional: Negative for chills and fever.   HENT: Negative.    Eyes: Negative.    Respiratory: Negative for cough.    Cardiovascular: Negative for chest pain and leg swelling.   Gastrointestinal: Negative for abdominal pain and nausea.   Genitourinary: Negative for dysuria.   Musculoskeletal: Positive for joint pain (right hip).   Skin: Negative.    Neurological: Negative for headaches.       Physical Exam  Temp:  [36.1 °C (97 °F)-36.7 °C (98.1 °F)] 36.7 °C (98.1 °F)  Pulse:  [60-83] 79  Resp:  [15-16] 15  BP: (105-122)/(58-68) 122/68  SpO2:  [94 %-96 %] 95 %    Physical Exam  Vitals signs reviewed.   Constitutional:       Appearance: She is ill-appearing.   HENT:      Head: Normocephalic.      Nose: Nose normal.      Mouth/Throat:      Mouth: Mucous membranes are moist.      Pharynx: Oropharynx is clear.   Eyes:      Pupils: Pupils are equal, round, and reactive to light.   Neck:      Musculoskeletal: Normal range of motion.   Cardiovascular:      Rate and Rhythm: Normal rate and regular rhythm.      Pulses: Normal  pulses.   Pulmonary:      Effort: Pulmonary effort is normal. No respiratory distress.   Abdominal:      General: Abdomen is flat. There is no distension.      Tenderness: There is no abdominal tenderness.   Musculoskeletal:         General: Tenderness (right hip) present.   Skin:     General: Skin is warm.   Neurological:      General: No focal deficit present.      Mental Status: She is alert.    Fluids    Intake/Output Summary (Last 24 hours) at 11/29/2020 1427  Last data filed at 11/29/2020 0942  Gross per 24 hour   Intake 120 ml   Output 500 ml   Net -380 ml       Laboratory  Recent Labs     11/27/20  0647 11/29/20 0622   WBC 8.6 7.8   RBC 2.89* 2.98*   HEMOGLOBIN 9.7* 10.1*   HEMATOCRIT 29.1* 30.0*   .7* 100.7*   MCH 33.6* 33.9*   MCHC 33.3* 33.7   RDW 48.9 47.8   PLATELETCT 101* 159*   MPV 10.4 10.1     Recent Labs     11/27/20 0647 11/29/20 0622   SODIUM 138 136   POTASSIUM 4.0 3.5*   CHLORIDE 107 107   CO2 22 23   GLUCOSE 117* 135*   BUN 17 28*   CREATININE 0.54 0.67   CALCIUM 8.9 9.6                   Imaging  DX-PORTABLE FLUORO > 1 HOUR   Final Result      Intraoperative fluoroscopic spot images as described above.      DX-FEMUR-2+ RIGHT   Final Result      Intraoperative fluoroscopic spot images as described above.      DX-HIP-COMPLETE - UNILATERAL 2+ RIGHT   Final Result      Right intertrochanteric fracture with angulation      DX-FEMUR-2+ RIGHT   Final Result      Right intertrochanteric fracture with angulation      DX-CHEST-PORTABLE (1 VIEW)   Final Result      No acute cardiopulmonary abnormality identified.           Assessment/Plan  * Closed right hip fracture (HCC)  Assessment & Plan  S/p intramedullary nailing of right hip on 11/26/2020.  Ortho following  Mobilize with therapy.  Weightbearing as tolerated with a walker  Pain control with oxycodone and toradol PRN  PT/OT     Late onset Alzheimer's disease with behavioral disturbance (HCC)- (present on admission)  Assessment &  Plan  Frequent orientation    HTN (hypertension)- (present on admission)  Assessment & Plan  Continue metoprolol for heart rate control  will resume amlodipine when BP allows    Paroxysmal atrial fibrillation (HCC)- (present on admission)  Assessment & Plan  Heart rate is under control  Continue metoprolol  EKG sinus rhythm    Urinary retention  Assessment & Plan  Straight cath       VTE prophylaxis: lovenox

## 2020-11-29 NOTE — CARE PLAN
Problem: Pain Management  Goal: Pain level will decrease to patient's comfort goal  Outcome: PROGRESSING AS EXPECTED  Note: Administering pain mediations as ordered (see MAR).  Providing patient with extra blankets, pillows for support, elevating LLE, applying ice as needed, and distraction.      Problem: Venous Thromboembolism (VTW)/Deep Vein Thrombosis (DVT) Prevention:  Goal: Patient will participate in Venous Thrombosis (VTE)/Deep Vein Thrombosis (DVT)Prevention Measures  Outcome: PROGRESSING AS EXPECTED  Flowsheets (Taken 11/29/2020 0800)  SCDs, Sequential Compression Device: On     Problem: Bowel/Gastric:  Goal: Normal bowel function is maintained or improved  Outcome: PROGRESSING AS EXPECTED  Flowsheets (Taken 11/29/2020 0800)  Last BM: 11/28/20     Problem: Skin Integrity  Goal: Risk for impaired skin integrity will decrease  Outcome: PROGRESSING AS EXPECTED  Note: [id number], waffle cushion, barrier paste in place.     Problem: Communication  Goal: The ability to communicate needs accurately and effectively will improve  Outcome: PROGRESSING SLOWER THAN EXPECTED  Note: Discussed POC with patient. Patient verbalized understanding, but requires frequent reorienting and reminding. Encouraging pt to voice questions and concerns. Oriented pt to use of call light.      Problem: Knowledge Deficit  Goal: Knowledge of disease process/condition, treatment plan, diagnostic tests, and medications will improve  Outcome: PROGRESSING SLOWER THAN EXPECTED  Note: Patient requires frequent reminding and reorienting.

## 2020-11-29 NOTE — PROGRESS NOTES
No new c/o  AVSS  + DF/PF  Hct 30  SCDs on    Plan:    DVT proph (SCDs, lovenox)  PT/OT  WBAT RLE  Dressing change  D/c planning

## 2020-11-30 NOTE — PROGRESS NOTES
Hospital Medicine Daily Progress Note    Date of Service  11/30/2020    Chief Complaint  89 y.o. female admitted 11/25/2020 with right hip pain post fall     Hospital Course  A 89-year-old woman nursing home resident ([facility]) with dementia, h/o breast cancer, s/p mastectomy, paroxysmal Afib, osteoporosis, HTN presented with right hip pain after mechanical fall.  XR showed right intertrochanteric fracture with angulation. S/p intramedullary nailing of right hip on 11/26/2020.  COVID 19 negative    Interval Problem Update  Reports right hip pain with ambulation, pain controlled with medications. Afebrile, hemodynamically stable.  Potassium 3.5, replaced    Consultants/Specialty  Orthopedic surgery    Code Status  DNAR/DNI    Disposition  PT, OT recommended postacute placement  Discharge likely to [facility]    Review of Systems  Review of Systems   Constitutional: Negative for chills and fever.   HENT: Negative.    Eyes: Negative.    Respiratory: Negative for cough.    Cardiovascular: Negative for chest pain and leg swelling.   Gastrointestinal: Negative for abdominal pain and nausea.   Genitourinary: Negative for dysuria.   Musculoskeletal: Positive for joint pain (right hip).   Skin: Negative.    Neurological: Negative for headaches.     Physical Exam  Temp:  [36.2 °C (97.2 °F)-36.6 °C (97.8 °F)] 36.6 °C (97.8 °F)  Pulse:  [74-99] 74  Resp:  [15-17] 16  BP: (101-122)/(57-73) 120/63  SpO2:  [92 %-97 %] 96 %       Physical Exam  Vitals signs reviewed.   Constitutional:    HENT:      Head: Normocephalic.      Nose: Nose normal.      Mouth/Throat:      Mouth: Mucous membranes are moist.      Pharynx: Oropharynx is clear.   Eyes:      Pupils: Pupils are equal, round, and reactive to light.   Neck:      Musculoskeletal: Normal range of motion.   Cardiovascular:      Rate and Rhythm: Normal rate and regular rhythm.      Pulses: Normal pulses.   Pulmonary:      Effort: Pulmonary effort is normal. No respiratory distress.    Abdominal:      General: Abdomen is flat. There is no distension.      Tenderness: There is no abdominal tenderness.   Musculoskeletal:         General: Tenderness (right hip) present.   Skin:     General: Skin is warm.   Neurological:      General: No focal deficit present.      Mental Status: She is alert.    Fluids    Intake/Output Summary (Last 24 hours) at 11/30/2020 1517  Last data filed at 11/30/2020 0939  Gross per 24 hour   Intake 60 ml   Output --   Net 60 ml       Laboratory  Recent Labs     11/29/20  0622 11/30/20  1050   WBC 7.8 7.0   RBC 2.98* 2.99*   HEMOGLOBIN 10.1* 10.1*   HEMATOCRIT 30.0* 30.3*   .7* 101.3*   MCH 33.9* 33.8*   MCHC 33.7 33.3*   RDW 47.8 48.4   PLATELETCT 159* 177   MPV 10.1 9.6     Recent Labs     11/29/20  0622 11/30/20  1050   SODIUM 136 137   POTASSIUM 3.5* 3.5*   CHLORIDE 107 106   CO2 23 23   GLUCOSE 135* 121*   BUN 28* 29*   CREATININE 0.67 0.53   CALCIUM 9.6 9.5                   Imaging  DX-PORTABLE FLUORO > 1 HOUR   Final Result      Intraoperative fluoroscopic spot images as described above.      DX-FEMUR-2+ RIGHT   Final Result      Intraoperative fluoroscopic spot images as described above.      DX-HIP-COMPLETE - UNILATERAL 2+ RIGHT   Final Result      Right intertrochanteric fracture with angulation      DX-FEMUR-2+ RIGHT   Final Result      Right intertrochanteric fracture with angulation      DX-CHEST-PORTABLE (1 VIEW)   Final Result      No acute cardiopulmonary abnormality identified.           Assessment/Plan  * Closed right hip fracture (HCC)  Assessment & Plan  S/p intramedullary nailing of right hip on 11/26/2020.  Ortho following  Mobilize with therapy.  Weightbearing as tolerated with a walker  Pain control with oxycodone and toradol PRN  PT/OT     Late onset Alzheimer's disease with behavioral disturbance (HCC)- (present on admission)  Assessment & Plan  Frequent orientation    HTN (hypertension)- (present on admission)  Assessment & Plan  Continue  metoprolol for heart rate control  will resume amlodipine when BP allows    Paroxysmal atrial fibrillation (HCC)- (present on admission)  Assessment & Plan  Heart rate is under control  Continue metoprolol  EKG sinus rhythm    Urinary retention  Assessment & Plan  Straight cath         VTE prophylaxis: lovenox

## 2020-11-30 NOTE — PROGRESS NOTES
Pt straight cath'd for volume: 350 ml  Distention noted, pt with urgency but unable to void, will notify MD

## 2020-11-30 NOTE — PROGRESS NOTES
Orthopaedic PA Progress Note    Interval changes:OT/PT notes read and appreciated, good SNF/Memory care candidate, clear for transfer from Ortho POV    ROS - Patient denies any new issues. No chest pain, dyspnea, or fever.  Pain well controlled.    /63   Pulse 74   Temp 36.6 °C (97.8 °F) (Temporal)   Resp 16   Ht 1.524 m (5')   Wt 59 kg (130 lb 1.1 oz)   SpO2 96%     Patient seen and examined  No acute distress  Breathing non labored  RRR  Surgical dressing is clean, dry, and intact. Patient clearly fires tibialis anterior, EHL, and gastrocnemius/soleus. Sensation is intact to light touch throughout superficial peroneal, deep peroneal, tibial, saphenous, and sural nerve distributions. Strong and palpable 2+ dorsalis pedis and posterior tibial pulses with capillary refill less than 2 seconds. No lower leg tenderness or discomfort.    Recent Labs     11/29/20  0622   WBC 7.8   RBC 2.98*   HEMOGLOBIN 10.1*   HEMATOCRIT 30.0*   .7*   MCH 33.9*   MCHC 33.7   RDW 47.8   PLATELETCT 159*   MPV 10.1       Active Hospital Problems    Diagnosis   • Closed right hip fracture (HCC) [S72.001A]     Priority: High   • Late onset Alzheimer's disease with behavioral disturbance (HCC) [G30.1, F02.81]     Priority: High   • HTN (hypertension) [I10]     Priority: High   • Paroxysmal atrial fibrillation (HCC) [I48.0]     Priority: High     January 2014: Echocardiogram with normal LV size, severe LVH, LVEF 70-75%. Trace MR, mild TR. RVSP 23mmHg.     • Urinary retention [R33.9]       Assessment/Plan:  POD#4 S/P IMN R hip  Wt bearing status - AT  PT/OT-initiated  Wound care:RN to change today and QOD/PRN if soaked  Drains - no  Montgomery-no  Sutures/Staples out- 10-14 days post operatively  Antibiotics: complete  DVT Prophylaxis- TEDS/SCDs/Foot pumps.   Lovenox: Start 40 mg daily, Duration-until ambulatory > 150', Transition to ASA at discharge if appropriate  Future Procedures - none planned  Case Coordination for  Discharge Planning - Disposition placement per PT/OT Reccs ok from Ortho standpoint, surgically cleared for disposition

## 2020-11-30 NOTE — DISCHARGE PLANNING
Received Choice form at 1403  Agency/Facility Name: (1) [facility] (2) [facility]  Referral sent per Choice form @ [id number]

## 2020-11-30 NOTE — DISCHARGE PLANNING
Anticipated Discharge Disposition: Back to snf vs. SNF    Action: LSW called [facility], the pt's Assisted Living Facility and left a msg at both #'s for the facility RN Killian [phone]/ [phone] to see if they can accommodate the pt's current level of needs.  Memory Care Coordinator Alma is out of the office today.    LSW called and spoke w/ pt's dtr Christiana, provided update on dc plan.  We will see if pt's snf can take pt back w/ HH services, If not, we will pursue SNF.  Christiana says she is in agreement w/ this plan, she prefers pt to return to snf if possible.  She provided verbal consent for SNF choice as backup plan: 1. [facility]  2. [facility].  Faxed to [facility].    Barriers to Discharge: DEBRA/ SNF acceptance, SNF bed availability    Plan: F/u w/ [facility] to discuss whether they can take pt back.

## 2020-12-01 NOTE — DISCHARGE PLANNING
Anticipated Discharge Disposition: SNF    Action: LSW spoke w/ Killian RN at [facility], pt's Assisted Living Facility.  She confirmed that they would not be able to accommodate the level of assistance that the pt currently needs, she recommended her to got to SNF for rehab prior to returning.    LSW spoke w/ pt's dtr Christiana and discussed the need for SNF, she is agreeable and provided verbal consent to pursue placement.  Pt has currently been accepted at both [facility] and [facility].  Christiana prefers pt go to [facility] if a bed becomes available, otherwise [facility] might have a bed for the pt on Friday.    Barriers to Discharge: bed availability    Plan: Continue to f/u w/ SNFs for bed availability.

## 2020-12-01 NOTE — PROGRESS NOTES
Orthopaedic PA Progress Note    Interval changes: clear for transfer from Ortho POV    ROS - Patient denies any new issues. No chest pain, dyspnea, or fever.  Pain well controlled.    /79   Pulse 79   Temp 36.6 °C (97.8 °F) (Temporal)   Resp 15   Ht 1.524 m (5')   Wt 59 kg (130 lb 1.1 oz)   SpO2 97%     Patient seen and examined  No acute distress  Breathing non labored  RRR  Surgical dressing is clean, dry, and intact. Patient clearly fires tibialis anterior, EHL, and gastrocnemius/soleus. Sensation is intact to light touch throughout superficial peroneal, deep peroneal, tibial, saphenous, and sural nerve distributions. Strong and palpable 2+ dorsalis pedis and posterior tibial pulses with capillary refill less than 2 seconds. No lower leg tenderness or discomfort.    Recent Labs     11/29/20  0622 11/30/20  1050   WBC 7.8 7.0   RBC 2.98* 2.99*   HEMOGLOBIN 10.1* 10.1*   HEMATOCRIT 30.0* 30.3*   .7* 101.3*   MCH 33.9* 33.8*   MCHC 33.7 33.3*   RDW 47.8 48.4   PLATELETCT 159* 177   MPV 10.1 9.6       Active Hospital Problems    Diagnosis   • Closed right hip fracture (HCC) [S72.001A]     Priority: High   • Late onset Alzheimer's disease with behavioral disturbance (HCC) [G30.1, F02.81]     Priority: High   • HTN (hypertension) [I10]     Priority: High   • Paroxysmal atrial fibrillation (HCC) [I48.0]     Priority: High     January 2014: Echocardiogram with normal LV size, severe LVH, LVEF 70-75%. Trace MR, mild TR. RVSP 23mmHg.     • Urinary retention [R33.9]       Assessment/Plan:  POD#5 S/P IMN R hip  Wt bearing status - AT  PT/OT-initiated  Wound care:RN to change today and QOD/PRN if soaked  Drains - no  Montgomery-no  Staples out- in one week  Antibiotics: complete  DVT Prophylaxis- TEDS/SCDs/Foot pumps.   Lovenox: Start 40 mg daily, Duration-until ambulatory > 150', Transition to ASA at discharge if appropriate  Future Procedures - none planned  Case Coordination for Discharge Planning -  Disposition placement per PT/OT Reccs ok from Ortho standpoint, surgically cleared for disposition

## 2020-12-01 NOTE — PROGRESS NOTES
Hospital Medicine Daily Progress Note    Date of Service  12/1/2020    Chief Complaint  89 y.o. female admitted 11/25/2020 with right hip pain post fall     Hospital Course  A 89-year-old woman nursing home resident ([facility]) with dementia, h/o breast cancer, s/p mastectomy, paroxysmal Afib, osteoporosis, HTN presented with right hip pain after mechanical fall.  XR showed right intertrochanteric fracture with angulation. S/p intramedullary nailing of right hip on 11/26/2020.  COVID 19 negative    Interval Problem Update  11/30: Reports right hip pain with ambulation, pain controlled with medications.   12/1: Feeling a bit better.  Repleting potassium.    Consultants/Specialty  Orthopedic surgery    Code Status  DNAR/DNI    Disposition  PT, OT recommended postacute placement  Discharge likely to SNF unless her assisted living can handle her.    Review of Systems  Review of Systems   Constitutional: Negative for chills and fever.   Respiratory: Negative for cough, shortness of breath and wheezing.    Cardiovascular: Negative for chest pain.   Gastrointestinal: Negative for constipation, diarrhea, nausea and vomiting.   Genitourinary: Negative for dysuria.   Musculoskeletal: Positive for joint pain (right hip and left foot).   Neurological: Negative for headaches.       Physical Exam  Temp:  [36.3 °C (97.4 °F)-36.7 °C (98 °F)] 36.6 °C (97.8 °F)  Pulse:  [79-84] 79  Resp:  [15-17] 15  BP: (101-120)/(59-79) 117/79  SpO2:  [92 %-97 %] 97 %       Physical Exam  Physical Exam  Constitutional:       Appearance: She is well-developed.   HENT:      Head: Normocephalic.   Cardiovascular:      Rate and Rhythm: Normal rate.      Heart sounds: No gallop.    Pulmonary:      Effort: No respiratory distress.      Breath sounds: No wheezing.   Abdominal:      General: There is no distension.      Tenderness: There is no abdominal tenderness.   Musculoskeletal:         General: Tenderness present.   Skin:     General: Skin is  warm.   Neurological:      Mental Status: She is alert. Mental status is at baseline.           Fluids    Intake/Output Summary (Last 24 hours) at 12/1/2020 1512  Last data filed at 12/1/2020 0900  Gross per 24 hour   Intake 300 ml   Output --   Net 300 ml       Laboratory  Recent Labs     11/29/20  0622 11/30/20  1050   WBC 7.8 7.0   RBC 2.98* 2.99*   HEMOGLOBIN 10.1* 10.1*   HEMATOCRIT 30.0* 30.3*   .7* 101.3*   MCH 33.9* 33.8*   MCHC 33.7 33.3*   RDW 47.8 48.4   PLATELETCT 159* 177   MPV 10.1 9.6     Recent Labs     11/29/20 0622 11/30/20  1050   SODIUM 136 137   POTASSIUM 3.5* 3.5*   CHLORIDE 107 106   CO2 23 23   GLUCOSE 135* 121*   BUN 28* 29*   CREATININE 0.67 0.53   CALCIUM 9.6 9.5                   Imaging  DX-PORTABLE FLUORO > 1 HOUR   Final Result      Intraoperative fluoroscopic spot images as described above.      DX-FEMUR-2+ RIGHT   Final Result      Intraoperative fluoroscopic spot images as described above.      DX-HIP-COMPLETE - UNILATERAL 2+ RIGHT   Final Result      Right intertrochanteric fracture with angulation      DX-FEMUR-2+ RIGHT   Final Result      Right intertrochanteric fracture with angulation      DX-CHEST-PORTABLE (1 VIEW)   Final Result      No acute cardiopulmonary abnormality identified.           Assessment/Plan  * Closed right hip fracture (HCC)  Assessment & Plan  S/p intramedullary nailing of right hip on 11/26/2020.  Ortho following  Mobilize with therapy.  Weightbearing as tolerated with a walker  Pain control with oxycodone and toradol PRN  PT/OT recommends postacute placement    Late onset Alzheimer's disease with behavioral disturbance (HCC)- (present on admission)  Assessment & Plan  Frequent orientation    HTN (hypertension)- (present on admission)  Assessment & Plan  Continue metoprolol for heart rate control  will resume amlodipine if patient develops hypertension again but patient may not need this as she is currently normotensive off of it    Paroxysmal  atrial fibrillation (HCC)- (present on admission)  Assessment & Plan  Heart rate is under control  Continue metoprolol  EKG sinus rhythm    Urinary retention  Assessment & Plan  Straight cath       VTE prophylaxis: lovenox

## 2020-12-01 NOTE — PROGRESS NOTES
Awaiting SNF  No new c/o  Incisions dry  + DF/PF  AVSS  Mobilize  WBAT RLE  DVT proph  [place] out 12/8  F/u [facility] 12/9 or 12/14

## 2020-12-01 NOTE — DISCHARGE PLANNING
Agency/Facility Name: [facility]  Spoke To: Via Epic response  Outcome: Referral accepted.  Can possibly transport Friday.

## 2020-12-01 NOTE — THERAPY
"Occupational Therapy  Daily Treatment     Patient Name: Fabien Linares  Age:  89 y.o., Sex:  female  Medical Record #: [id number]  Today's Date: 11/30/2020       Precautions: Fall Risk, Weight Bearing As Tolerated Right Lower Extremity  Comments: pt has dementia, poor carry over for new learning    Assessment    Pt seen for OT tx.  Pt is pleasant, confused & limited by right hip pain.  Pt did well with encouragement & was Mod A for transfer to BSC.  Pt transferred to recliner chair after tx with Mod A.  Pt would benefit from being OOB daily for meals.  Called pt's daughter Christiana & provided daughter with an update.      Plan    Continue current treatment plan.  OT tx  4 x/week      DC Equipment Recommendations: Unable to determine at this time  Discharge Recommendations: Recommend post-acute placement for additional occupational therapy services prior to discharge home    Subjective    \"It's so cold in here\"     Objective       11/30/20 1448   Cognition    Cognition / Consciousness X   Level of Consciousness Confused   Ability To Follow Commands 1 Step   Safety Awareness Impaired   New Learning Impaired   Attention Impaired   Sequencing Impaired   Comments Pt limited by pain but was pleasant & did well with encouragement.  Pt has dementia & poor ST memory   Balance   Sitting Balance (Static) Fair   Sitting Balance (Dynamic) Fair -   Standing Balance (Static) Poor   Standing Balance (Dynamic) Trace +   Weight Shift Sitting Fair   Weight Shift Standing Poor   Bed Mobility    Supine to Sit Maximal Assist   Sit to Supine   (pt left up in chair)   Scooting Maximal Assist   Activities of Daily Living   Eating Minimal Assist   Grooming Moderate Assist;Seated   Toileting Maximal Assist   Functional Mobility   Sit to Stand Moderate Assist   Bed, Chair, Wheelchair Transfer Moderate Assist   Toilet Transfers Moderate Assist  (BSC)   Short Term Goals   Short Term Goal # 1 Pt will complete hygiene/grooming tasks standing at sink " at supervision x 5 sessions   Goal Outcome # 1 Goal not met   Short Term Goal # 2 Pt will complete functional transfers to/from toilet/chair/EOB at supervision x 5 sessions   Goal Outcome # 2 Goal not met   Short Term Goal # 3 Pt will complete toileting tasks at min A x 5 sessions   Goal Outcome # 3 Goal not met

## 2020-12-01 NOTE — PROGRESS NOTES
DATE OF SERVICE: 11/30/2020    TIME:  Approximately 6:25 a.m.    SUBJECTIVE:  The patient is asleep.  I did not wake her up.  She has SCDs on.    OBJECTIVE:  VITAL SIGNS:  Her blood pressure most recently 109/67, heart rate 99,   respirations 17, temperature 99.7.    ASSESSMENT:  Right intertrochanteric hip fracture -- status post   intramedullary nailing.    PLAN:  Mobilize with therapy.  Weightbearing as tolerated.  DVT prophylaxis   with SCDs and Lovenox.  Discharge planning.  Likely will require SNF.  We will   see her back in the clinic on either 12/07 or 12/09 for repeat radiographs.    If the clinic visit is set up for 12/09, then she should have her staples   removed at the skilled nursing facility on 12/08.       ____________________________________     MD JAIRO FLORES / [place]    DD:  11/30/2020 09:24:26  DT:  11/30/2020 19:54:54    D#:  [id number]  Job#:  [id number]

## 2020-12-02 NOTE — DISCHARGE PLANNING
LSW returned phone call to pt's dtr Christiana, answered her questions and updated her on dc plan.  Pt will possibly dc to [facility] on Friday 12/4, pending final confirmation of bed availability, unless a bed happens to open up at [facility]F tomorrow.

## 2020-12-02 NOTE — THERAPY
Physical Therapy   Daily Treatment     Patient Name: Fabien Linares  Age:  89 y.o., Sex:  female  Medical Record #: [id number]  Today's Date: 12/2/2020     Precautions: Fall Risk, Weight Bearing As Tolerated Right Lower Extremity    Assessment    Pt appears to be most limited by her cognition and pain. She required maximal encouragement to attempt to sit upright EOB. Ultimately, she required maximal assist to transition from supine to sit but once seated, sustained at CGA for safety. Pt returned herself to supine with mod A for LE. PT will follow to address limited activity tolerance. Recommend placement; anticipate need for long term placement.     Plan    Treatment plan modified to 3 times per week until therapy goals are met for the following treatments:  Bed Mobility, Equipment, Gait Training, Neuro Re-Education / Balance, Self Care/Home Evaluation, Stair Training, Therapeutic Activities and Therapeutic Exercises.    DC Equipment Recommendations: Unable to determine at this time  Discharge Recommendations: Recommend post-acute placement for additional physical therapy services prior to discharge home       Objective       12/02/20 0846   Balance   Comments CGA to remain upright in sitting   Gait Analysis   Gait Level Of Assist Refused   Bed Mobility    Supine to Sit Maximal Assist   Sit to Supine Moderate Assist   Scooting Maximal Assist   Rolling Minimum Assist to Lt.   Comments assist for LE and trunk   Functional Mobility   Sit to Stand Refused   Bed, Chair, Wheelchair Transfer Refused   Short Term Goals    Short Term Goal # 1 Patient will demonstrate bed mobility sup>sit with Deanne in 6 visits   Goal Outcome # 1 goal not met   Short Term Goal # 2 Patient will perform STS with Deanne by 6 visits   Goal Outcome # 2 Goal not met   Short Term Goal # 3 Patient will ambulate x15' with FWW by 6 visits in order to increase independence   Goal Outcome # 3 Goal not met   Anticipated Discharge Equipment and  Recommendations   DC Equipment Recommendations Unable to determine at this time   Discharge Recommendations Recommend post-acute placement for additional physical therapy services prior to discharge home

## 2020-12-02 NOTE — PROGRESS NOTES
Hospital Medicine Daily Progress Note    Date of Service  12/2/2020    Chief Complaint  89 y.o. female admitted 11/25/2020 with right hip pain post fall     Hospital Course  A 89-year-old woman nursing home resident ([facility]) with dementia, h/o breast cancer, s/p mastectomy, paroxysmal Afib, osteoporosis, HTN presented with right hip pain after mechanical fall.  XR showed right intertrochanteric fracture with angulation. S/p intramedullary nailing of right hip on 11/26/2020.  COVID 19 negative    Interval Problem Update  11/30: Reports right hip pain with ambulation, pain controlled with medications.   12/1: Feeling a bit better.  Repleting potassium.    12/2: complaint about pain. PT/OT, SNF    Consultants/Specialty  Orthopedic surgery    Code Status  DNAR/DNI    Disposition  PT, OT recommended postacute placement  Discharge likely to SNF unless her assisted living can handle her.    Review of Systems  Review of Systems   Constitutional: Negative for chills and fever.   Eyes: Negative for photophobia and pain.   Respiratory: Negative for cough, shortness of breath and wheezing.    Cardiovascular: Negative for chest pain.   Gastrointestinal: Negative for constipation, diarrhea, nausea and vomiting.   Genitourinary: Negative for dysuria, frequency and urgency.   Musculoskeletal: Positive for joint pain (right hip and left foot).   Neurological: Negative for headaches.       Physical Exam  Temp:  [36.6 °C (97.8 °F)-36.8 °C (98.2 °F)] 36.7 °C (98 °F)  Pulse:  [75-93] 75  Resp:  [15-18] 16  BP: (107-125)/(54-79) 115/54  SpO2:  [92 %-97 %] 92 %       Physical Exam  Physical Exam  Constitutional:       Appearance: She is well-developed.   HENT:      Head: Normocephalic.   Cardiovascular:      Rate and Rhythm: Normal rate and regular rhythm.      Pulses: Normal pulses.      Heart sounds: Normal heart sounds. No gallop.    Pulmonary:      Effort: Pulmonary effort is normal. No respiratory distress.      Breath sounds:  Normal breath sounds. No wheezing.   Abdominal:      General: There is no distension.      Tenderness: There is no abdominal tenderness.   Musculoskeletal:         General: Tenderness present.   Skin:     General: Skin is warm.   Neurological:      Mental Status: She is alert. Mental status is at baseline.           Fluids    Intake/Output Summary (Last 24 hours) at 12/2/2020 0754  Last data filed at 12/1/2020 0900  Gross per 24 hour   Intake 300 ml   Output --   Net 300 ml       Laboratory  Recent Labs     11/30/20  1050   WBC 7.0   RBC 2.99*   HEMOGLOBIN 10.1*   HEMATOCRIT 30.3*   .3*   MCH 33.8*   MCHC 33.3*   RDW 48.4   PLATELETCT 177   MPV 9.6     Recent Labs     11/30/20  1050   SODIUM 137   POTASSIUM 3.5*   CHLORIDE 106   CO2 23   GLUCOSE 121*   BUN 29*   CREATININE 0.53   CALCIUM 9.5                   Imaging  DX-PORTABLE FLUORO > 1 HOUR   Final Result      Intraoperative fluoroscopic spot images as described above.      DX-FEMUR-2+ RIGHT   Final Result      Intraoperative fluoroscopic spot images as described above.      DX-HIP-COMPLETE - UNILATERAL 2+ RIGHT   Final Result      Right intertrochanteric fracture with angulation      DX-FEMUR-2+ RIGHT   Final Result      Right intertrochanteric fracture with angulation      DX-CHEST-PORTABLE (1 VIEW)   Final Result      No acute cardiopulmonary abnormality identified.           Assessment/Plan  * Closed right hip fracture (HCC)  Assessment & Plan  S/p intramedullary nailing of right hip on 11/26/2020.  Ortho following  Mobilize with therapy.  Weightbearing as tolerated with a walker  Pain control with oxycodone and toradol PRN  PT/OT recommends postacute placement  SW on    Late onset Alzheimer's disease with behavioral disturbance (HCC)- (present on admission)  Assessment & Plan  Frequent orientation    HTN (hypertension)- (present on admission)  Assessment & Plan  Continue metoprolol for heart rate control  will resume amlodipine if patient develops  hypertension again but patient may not need this as she is currently normotensive off of it    Paroxysmal atrial fibrillation (HCC)- (present on admission)  Assessment & Plan  Heart rate is under control  Continue metoprolol  EKG sinus rhythm    Urinary retention  Assessment & Plan  Straight cath       VTE prophylaxis: lovenox

## 2020-12-02 NOTE — PROGRESS NOTES
Orthopaedic PA Progress Note    Interval changes: clear for transfer from Ortho POV, awaiting bed per MSW    ROS - Patient denies any new issues. No chest pain, dyspnea, or fever.  Pain well controlled.    /66   Pulse 77   Temp 36.6 °C (97.9 °F) (Temporal)   Resp 16   Ht 1.524 m (5')   Wt 59 kg (130 lb 1.1 oz)   SpO2 96%     Patient seen and examined  No acute distress  Breathing non labored  RRR  Surgical dressing is clean, dry, and intact. Patient clearly fires tibialis anterior, EHL, and gastrocnemius/soleus. Sensation is intact to light touch throughout superficial peroneal, deep peroneal, tibial, saphenous, and sural nerve distributions. Strong and palpable 2+ dorsalis pedis and posterior tibial pulses with capillary refill less than 2 seconds. No lower leg tenderness or discomfort.    Recent Labs     11/30/20  1050   WBC 7.0   RBC 2.99*   HEMOGLOBIN 10.1*   HEMATOCRIT 30.3*   .3*   MCH 33.8*   MCHC 33.3*   RDW 48.4   PLATELETCT 177   MPV 9.6       Active Hospital Problems    Diagnosis   • Closed right hip fracture (HCC) [S72.001A]     Priority: High   • Late onset Alzheimer's disease with behavioral disturbance (HCC) [G30.1, F02.81]     Priority: High   • HTN (hypertension) [I10]     Priority: High   • Paroxysmal atrial fibrillation (HCC) [I48.0]     Priority: High     January 2014: Echocardiogram with normal LV size, severe LVH, LVEF 70-75%. Trace MR, mild TR. RVSP 23mmHg.     • Urinary retention [R33.9]       Assessment/Plan:  POD#6 S/P IMN R hip  Wt bearing status - AT  PT/OT-initiated  Wound care:RN to change QOD/PRN if soaked  Drains - no  [place]-no  Staples out- in one week  Antibiotics: complete  DVT Prophylaxis- TEDS/SCDs/Foot pumps.   Lovenox: Start 40 mg daily, Duration-until ambulatory > 150', Transition to ASA at discharge if appropriate  Future Procedures - none planned  Case Coordination for Discharge Planning - Disposition placement per PT/OT Recs ok from Ortho standpoint,  surgically cleared for disposition

## 2020-12-02 NOTE — PROGRESS NOTES
[facility] cathed for 550ml. Bladder scan rechecked at 17ml. Hospitalist updated with no new orders given.

## 2020-12-02 NOTE — PROGRESS NOTES
Patient reporting lower ABD pain, and continues to report that she needs to urinate but is unable. Bladder scanned for > 547ml. Hospitalist paged for update/new orders.

## 2020-12-02 NOTE — CARE PLAN
Problem: Safety  Goal: Will remain free from injury  Outcome: PROGRESSING AS EXPECTED  Goal: Will remain free from falls  Outcome: PROGRESSING AS EXPECTED  Patient remains free of fall or injury thus far. Bed remains in low and locked position. Bed alarm utilized. Patient room located near nurses station. Non-skid footwear.      Problem: Venous Thromboembolism (VTW)/Deep Vein Thrombosis (DVT) Prevention:  Goal: Patient will participate in Venous Thrombosis (VTE)/Deep Vein Thrombosis (DVT)Prevention Measures  Outcome: PROGRESSING AS EXPECTED

## 2020-12-02 NOTE — THERAPY
Occupational Therapy  Daily Treatment     Patient Name: Fabien Linares  Age:  89 y.o., Sex:  female  Medical Record #: [id number]  Today's Date: 12/2/2020     Precautions: Fall Risk, Weight Bearing As Tolerated Right Lower Extremity  Comments: hx of dementia, WBAT RLE    Assessment    Pt seen in OT session. Upon entry, pt motivated for oob activity d/t urgency to use BSC. Pt's confusion, pain, inattention, and balance deficits limit her ability to complete ADLs and ADL transfers safely and independently.  Patient requiring max v/cs for sequencing and to remain on task.  Pt able to come to EOB with min assist for RLE.  Pt requiring min A with v/cs for weight shifting and sequencing during BSC txf.   Continues to make progress on OT goals.    Plan    Continue current treatment plan.    DC Equipment Recommendations: Unable to determine at this time  Discharge Recommendations: Recommend post-acute placement for additional occupational therapy services prior to discharge home       12/02/20 1410   Cognition    Level of Consciousness Confused   Ability To Follow Commands 1 Step   Safety Awareness Impaired   New Learning Impaired   Attention Impaired   Sequencing Impaired   Comments pt confused, agreeable to work with tx to get to BSC   Balance   Sitting Balance (Static) Fair   Sitting Balance (Dynamic) Fair -   Standing Balance (Static) Poor   Standing Balance (Dynamic) Poor   Bed Mobility    Supine to Sit Minimal Assist  (for RLE, w/ max v/cs)   Sit to Supine Moderate Assist  (BLE > bed)   Scooting Minimal Assist   Rolling Maximum Assist to Lt.   Comments assist for LE   Activities of Daily Living   Upper Body Dressing Moderate Assist  (don gown, doff supv)   Toileting Moderate Assist  (mod A bed<>BSC, maxA pericare)   Comments requiring max v/cs for sequencing, reassurance   Functional Mobility   Sit to Stand Minimal Assist   Bed, Chair, Wheelchair Transfer Moderate Assist   Toilet Transfers Moderate Assist  (BSC)    Transfer Method Stand Pivot   Mobility bed<>BSC   Comments requiring extra time and max v/cs for sequencing and weight shifting   Short Term Goals   Short Term Goal # 1 Pt will complete hygiene/grooming tasks standing at sink at supervision x 5 sessions   Goal Outcome # 1 Goal not met   Short Term Goal # 2 Pt will complete functional transfers to/from toilet/chair/EOB at supervision x 5 sessions   Goal Outcome # 2 Progressing as expected   Short Term Goal # 3 Pt will complete toileting tasks at min A x 5 sessions   Goal Outcome # 3 Progressing as expected   Anticipated Discharge Equipment and Recommendations   DC Equipment Recommendations Unable to determine at this time   Discharge Recommendations Recommend post-acute placement for additional occupational therapy services prior to discharge home

## 2020-12-03 NOTE — DISCHARGE SUMMARY
Discharge Summary    CHIEF COMPLAINT ON ADMISSION  Chief Complaint   Patient presents with   • GLF     [facility]       Reason for Admission  EMS     Admission Date  11/25/2020    CODE STATUS  DNAR/DNI    HPI & HOSPITAL COURSE  A 89-year-old woman nursing home resident ([facility]) with dementia, h/o breast cancer, s/p mastectomy, paroxysmal Afib, osteoporosis, HTN presented with right hip pain after mechanical fall.  XR showed right intertrochanteric fracture with angulation. S/p intramedullary nailing of right hip on 11/26/2020.  COVID 19 negative. PT/OT recommended SNF. Surgery said she can be discharged from their standpoint. I saw and examined the patient today.  Please call [phone] to schedule PCP appointment for patient.    Required specialty appointments include:     As below    Therefore, she is discharged in fair and stable condition to home with close outpatient follow-up.    The patient met 2-midnight criteria for an inpatient stay at the time of discharge.    Discharge Date  12/03/20      FOLLOW UP ITEMS POST DISCHARGE      DISCHARGE DIAGNOSES  Principal Problem:    Closed right hip fracture (HCC) POA: Unknown  Active Problems:    Paroxysmal atrial fibrillation (HCC) POA: Yes      Overview: January 2014: Echocardiogram with normal LV size, severe LVH, LVEF 70-75%.       Trace MR, mild TR. RVSP 23mmHg.    HTN (hypertension) POA: Yes    Late onset Alzheimer's disease with behavioral disturbance (HCC) POA: Yes    Urinary retention POA: Unknown  Resolved Problems:    * No resolved hospital problems. *      FOLLOW UP  No future appointments.  Drew Ceja M.D.  [address] [place] [address]  [phone]      12/9 AM or 12/14    Kerry Lugo, A.P.N.  [address]  [place] [address]  [phone]    In 1 week        MEDICATIONS ON DISCHARGE     Medication List      START taking these medications      Instructions   oxyCODONE immediate-release 5 MG  Tabs  Commonly known as: ROXICODONE   Take 0.5 Tabs by mouth every 3 hours as needed for up to 3 days.  Dose: 2.5 mg     senna-docusate 8.6-50 MG Tabs  Commonly known as: PERICOLACE or SENOKOT S   Take 2 Tabs by mouth 2 Times a Day.  Dose: 2 Tab        CHANGE how you take these medications      Instructions   metoprolol SR 25 MG Tb24  What changed:   · how much to take  · when to take this  · additional instructions  Commonly known as: TOPROL XL   Take 0.5 Tabs by mouth every bedtime.  Dose: 12.5 mg        CONTINUE taking these medications      Instructions   amLODIPine 5 MG Tabs  Commonly known as: NORVASC   Take 5 mg by mouth every morning.  Dose: 5 mg     atorvastatin 40 MG Tabs  Commonly known as: LIPITOR   Take 1 Tab by mouth every bedtime.  Dose: 40 mg     D 5000 125 MCG (5000 UT) Tabs  Generic drug: Cholecalciferol   Take 5,000 Units by mouth every morning.  Dose: 5,000 Units     famotidine 20 MG Tabs  Commonly known as: PEPCID   Take 20 mg by mouth 2 times a day.  Dose: 20 mg     QUEtiapine 25 MG Tabs  Commonly known as: Seroquel   Take 25 mg by mouth every 6 hours as needed (agitation, wandering, exit seeking behaviors).  Dose: 25 mg            Allergies  Allergies   Allergen Reactions   • Actonel [Risedronate Sodium]      Leg pain   • Amoxicillin    • Morphine Sulfate      Patient became A Fib   • Naproxen      unknown rxn; doctor said not to take   • Vicodin [Hydrocodone-Acetaminophen]      Unknown rxn; doctor said not to take       DIET  Orders Placed This Encounter   Procedures   • Diet Order Diet: Level 7 - Easy to Chew (please chop food ); Liquid level: Level 0 - Thin     Standing Status:   Standing     Number of Occurrences:   1     Order Specific Question:   Diet:     Answer:   Level 7 - Easy to Chew [22]     Comments:   please chop food      Order Specific Question:   Liquid level     Answer:   Level 0 - Thin       ACTIVITY  As tolerated.  Weight bearing as  tolerated    CONSULTATIONS      PROCEDURES      LABORATORY  Lab Results   Component Value Date    SODIUM 137 11/30/2020    POTASSIUM 3.5 (L) 11/30/2020    CHLORIDE 106 11/30/2020    CO2 23 11/30/2020    GLUCOSE 121 (H) 11/30/2020    BUN 29 (H) 11/30/2020    CREATININE 0.53 11/30/2020    CREATININE 0.9 11/30/2005        Lab Results   Component Value Date    WBC 7.0 11/30/2020    HEMOGLOBIN 10.1 (L) 11/30/2020    HEMATOCRIT 30.3 (L) 11/30/2020    PLATELETCT 177 11/30/2020        Total time of the discharge process exceeds 38 minutes.

## 2020-12-03 NOTE — DISCHARGE PLANNING
Received Transport Form @ 0185  Spoke to Justine @ [facility]    Transport is scheduled for 12/03/2020 @1300 going to [facility].    Left message for Kerri at [facility] of transport time with [facility].

## 2020-12-03 NOTE — DISCHARGE PLANNING
Anticipated Discharge Disposition: [facility] today at 1300    Action: Pt is scheduled to transport to [facility] today at 1300 via [facility].  LSW called pt's dtr, she provided verbal consent for pt to tx today.    Notified Attending MD Anguiano and BS TED Mcnulty.    Barriers to Discharge: N/A    Plan: Transfer packet to be placed in pt's chart.

## 2020-12-03 NOTE — PROGRESS NOTES
"   Orthopaedic PA Progress Note    Interval changes: clear for transfer from [facility] POV, to [facility] later today    ROS - Patient denies any new issues. No chest pain, dyspnea, or fever.  Pain well controlled.    /58   Pulse 65   Temp 36.8 °C (98.2 °F) (Temporal)   Resp 16   Ht 1.549 m (5' 1\")   Wt 59 kg (130 lb 1.1 oz)   SpO2 93%     Patient seen and examined  No acute distress  Breathing non labored  RRR  Surgical dressing is clean, dry, and intact. Patient clearly fires tibialis anterior, EHL, and gastrocnemius/soleus. Sensation is intact to light touch throughout superficial peroneal, deep peroneal, tibial, saphenous, and sural nerve distributions. Strong and palpable 2+ dorsalis pedis and posterior tibial pulses with capillary refill less than 2 seconds. No lower leg tenderness or discomfort.          Active Hospital Problems    Diagnosis   • Closed right hip fracture (HCC) [S72.001A]     Priority: High   • Late onset Alzheimer's disease with behavioral disturbance (HCC) [G30.1, F02.81]     Priority: High   • HTN (hypertension) [I10]     Priority: High   • Paroxysmal atrial fibrillation (HCC) [I48.0]     Priority: High     January 2014: Echocardiogram with normal LV size, severe LVH, LVEF 70-75%. Trace MR, mild TR. RVSP 23mmHg.     • Urinary retention [R33.9]       Assessment/Plan:  POD#7 S/P IMN R hip  Wt bearing status - AT  PT/OT-initiated  Wound care:RN to change QOD/PRN if soaked  Drains - no  [place]-no  Staples out- in one week  Antibiotics: complete  DVT Prophylaxis- TEDS/SCDs/Foot pumps.   Lovenox: Start 40 mg daily, Duration-until ambulatory > 150', Transition to ASA at discharge if appropriate  Future Procedures - none planned  Case Coordination for Discharge Planning - Disposition placement per PT/OT Recs ok from Ortho standpoint, surgically cleared for disposition  Follow-Up: needs appointment with Dr. Ceja at [facility] at 10-14 days post-op for re-evaluation, staple " removal and radiographs.

## 2020-12-03 NOTE — DISCHARGE INSTRUCTIONS
Discharge Instructions    Discharged to other by medical transportation with escort. Discharged via [facility] escort: Yes.  Special equipment needed: Not Applicable    Be sure to schedule a follow-up appointment with your primary care doctor or any specialists as instructed.     Discharge Plan:        I understand that a diet low in cholesterol, fat, and sodium is recommended for good health. Unless I have been given specific instructions below for another diet, I accept this instruction as my diet prescription.   Other diet: Easy to chew, thin liquids.    Special Instructions: Discharge instructions for the Orthopedic Patient    Follow up with Primary Care Physician within 2 weeks of discharge to home, regarding:  Review of medications and diagnostic testing.  Surveillance for medical complications.  Workup and treatment of osteoporosis, if appropriate.     -Is this a Hip/Knee/Shoulder Joint Replacement patient? No    -Is this patient being discharged with medication to prevent blood clots?  No- Right Hip fracture    · Is patient discharged on Warfarin / Coumadin?   No   Incision Care, Adult  An incision is a cut that a doctor makes in your skin for surgery (for a procedure). Most times, these cuts are closed after surgery. Your cut from surgery may be closed with stitches (sutures), staples, skin glue, or skin tape (adhesive strips). You may need to return to your doctor to have stitches or staples taken out. This may happen many days or many weeks after your surgery. The cut needs to be well cared for so it does not get infected.  How to care for your cut  Cut care    Follow instructions from your doctor about how to take care of your cut. Make sure you:  Wash your hands with soap and water before you change your bandage (dressing). If you cannot use soap and water, use hand .  Change your bandage as told by your doctor.  Leave stitches, skin glue, or skin tape in place. They may need to stay in  place for 2 weeks or longer. If tape strips get loose and curl up, you may trim the loose edges. Do not remove tape strips completely unless your doctor says it is okay.  Check your cut area every day for signs of infection. Check for:  More redness, swelling, or pain.  More fluid or blood.  Warmth.  Pus or a bad smell.  Ask your doctor how to clean the cut. This may include:  Using mild soap and water.  Using a clean towel to pat the cut dry after you clean it.  Putting a cream or ointment on the cut. Do this only as told by your doctor.  Covering the cut with a clean bandage.  Ask your doctor when you can leave the cut uncovered.  Do not take baths, swim, or use a hot tub until your doctor says it is okay. Ask your doctor if you can take showers. You may only be allowed to take sponge baths for bathing.  Medicines  If you were prescribed an antibiotic medicine, cream, or ointment, take the antibiotic or put it on the cut as told by your doctor. Do not stop taking or putting on the antibiotic even if your condition gets better.  Take over-the-counter and prescription medicines only as told by your doctor.  General instructions  Limit movement around your cut. This helps healing.  Avoid straining, lifting, or exercise for the first month, or for as long as told by your doctor.  Follow instructions from your doctor about going back to your normal activities.  Ask your doctor what activities are safe.  Protect your cut from the sun when you are outside for the first 6 months, or for as long as told by your doctor. Put on sunscreen around the scar or cover up the scar.  Keep all follow-up visits as told by your doctor. This is important.  Contact a doctor if:  Your have more redness, swelling, or pain around the cut.  You have more fluid or blood coming from the cut.  Your cut feels warm to the touch.  You have pus or a bad smell coming from the cut.  You have a fever or shaking chills.  You feel sick to your stomach  (nauseous) or you throw up (vomit).  You are dizzy.  Your stitches or staples come undone.  Get help right away if:  You have a red streak coming from your cut.  Your cut bleeds through the bandage and the bleeding does not stop with gentle pressure.  The edges of your cut open up and separate.  You have very bad (severe) pain.  You have a rash.  You are confused.  You pass out (faint).  You have trouble breathing and you have a fast heartbeat.  This information is not intended to replace advice given to you by your health care provider. Make sure you discuss any questions you have with your health care provider.  Document Released: 03/11/2013 Document Revised: 05/07/2018 Document Reviewed: 08/25/2017  Elsevier Patient Education © 2020 [employer]      Depression / Suicide Risk    As you are discharged from this [facility] facility, it is important to learn how to keep safe from harming yourself.    Recognize the warning signs:  · Abrupt changes in personality, positive or negative- including increase in energy   · Giving away possessions  · Change in eating patterns- significant weight changes-  positive or negative  · Change in sleeping patterns- unable to sleep or sleeping all the time   · Unwillingness or inability to communicate  · Depression  · Unusual sadness, discouragement and loneliness  · Talk of wanting to die  · Neglect of personal appearance   · Rebelliousness- reckless behavior  · Withdrawal from people/activities they love  · Confusion- inability to concentrate     If you or a loved one observes any of these behaviors or has concerns about self-harm, here's what you can do:  · Talk about it- your feelings and reasons for harming yourself  · Remove any means that you might use to hurt yourself (examples: pills, rope, extension cords, firearm)  · Get professional help from the community (Mental Health, Substance Abuse, psychological counseling)  · Do not be alone:Call your Safe Contact- someone  whom you trust who will be there for you.  · Call your local CRISIS HOTLINE 978-3675 or 629-907-4381  · Call your local Children's Mobile Crisis Response Team [place] (660) 856-4713 or www.[employer]  · Call the toll free National Suicide Prevention Hotlines   · National Suicide Prevention Lifeline 219-400-KHCM (4477)  · [employer] Line Network 800-SUICIDE (974-6799)

## 2020-12-03 NOTE — CARE PLAN
Problem: Pain Management  Goal: Pain level will decrease to patient's comfort goal  Outcome: PROGRESSING AS EXPECTED     Problem: Safety  Goal: Will remain free from injury  Outcome: PROGRESSING AS EXPECTED  Goal: Will remain free from falls  Outcome: PROGRESSING AS EXPECTED

## 2020-12-03 NOTE — PROGRESS NOTES
Hospital Medicine Daily Progress Note    Date of Service  12/3/2020    Chief Complaint  89 y.o. female admitted 11/25/2020 with right hip pain post fall     Hospital Course  A 89-year-old woman nursing home resident ([facility]) with dementia, h/o breast cancer, s/p mastectomy, paroxysmal Afib, osteoporosis, HTN presented with right hip pain after mechanical fall.  XR showed right intertrochanteric fracture with angulation. S/p intramedullary nailing of right hip on 11/26/2020.  COVID 19 negative    Interval Problem Update  11/30: Reports right hip pain with ambulation, pain controlled with medications.   12/1: Feeling a bit better.  Repleting potassium.    12/2: complaint about pain. PT/OT, SNF    12/3: sleeping comfortably on the bed. No event overnight.    Consultants/Specialty  Orthopedic surgery    Code Status  DNAR/DNI    Disposition  PT, OT recommended postacute placement  Discharge likely to SNF unless her assisted living can handle her.    Review of Systems  Review of Systems   Constitutional: Negative for chills and fever.   Eyes: Negative for double vision, photophobia and pain.   Respiratory: Negative for cough, shortness of breath and wheezing.    Cardiovascular: Negative for chest pain.   Gastrointestinal: Negative for constipation, diarrhea, nausea and vomiting.   Genitourinary: Negative for frequency and urgency.   Musculoskeletal: Positive for joint pain (right hip and left foot).   Neurological: Negative for dizziness and headaches.       Physical Exam  Temp:  [36.4 °C (97.6 °F)-36.6 °C (97.9 °F)] 36.4 °C (97.6 °F)  Pulse:  [67-77] 67  Resp:  [16] 16  BP: (116-121)/(57-66) 116/57  SpO2:  [94 %-96 %] 94 %       Physical Exam  Physical Exam  Constitutional:       Appearance: She is well-developed.   HENT:      Head: Normocephalic.   Cardiovascular:      Rate and Rhythm: Normal rate and regular rhythm.      Pulses: Normal pulses.      Heart sounds: Normal heart sounds.   Pulmonary:      Effort:  Pulmonary effort is normal. No respiratory distress.      Breath sounds: Normal breath sounds.   Abdominal:      General: There is no distension.   Musculoskeletal:         General: Tenderness present.   Skin:     General: Skin is warm.   Neurological:      Mental Status: She is alert. Mental status is at baseline.           Fluids    Intake/Output Summary (Last 24 hours) at 12/3/2020 0757  Last data filed at 12/2/2020 1356  Gross per 24 hour   Intake 480 ml   Output --   Net 480 ml       Laboratory  Recent Labs     11/30/20  1050   WBC 7.0   RBC 2.99*   HEMOGLOBIN 10.1*   HEMATOCRIT 30.3*   .3*   MCH 33.8*   MCHC 33.3*   RDW 48.4   PLATELETCT 177   MPV 9.6     Recent Labs     11/30/20  1050   SODIUM 137   POTASSIUM 3.5*   CHLORIDE 106   CO2 23   GLUCOSE 121*   BUN 29*   CREATININE 0.53   CALCIUM 9.5                   Imaging  DX-PORTABLE FLUORO > 1 HOUR   Final Result      Intraoperative fluoroscopic spot images as described above.      DX-FEMUR-2+ RIGHT   Final Result      Intraoperative fluoroscopic spot images as described above.      DX-HIP-COMPLETE - UNILATERAL 2+ RIGHT   Final Result      Right intertrochanteric fracture with angulation      DX-FEMUR-2+ RIGHT   Final Result      Right intertrochanteric fracture with angulation      DX-CHEST-PORTABLE (1 VIEW)   Final Result      No acute cardiopulmonary abnormality identified.           Assessment/Plan  * Closed right hip fracture (HCC)  Assessment & Plan  S/p intramedullary nailing of right hip on 11/26/2020.  Ortho following  Mobilize with therapy.  Weightbearing as tolerated with a walker  Pain control with oxycodone and toradol PRN  PT/OT recommends postacute placement  SNF referral  SW on    Late onset Alzheimer's disease with behavioral disturbance (HCC)- (present on admission)  Assessment & Plan  Frequent orientation    HTN (hypertension)- (present on admission)  Assessment & Plan  Continue metoprolol for heart rate control  will resume  amlodipine if patient develops hypertension again but patient may not need this as she is currently normotensive off of it    Paroxysmal atrial fibrillation (HCC)- (present on admission)  Assessment & Plan  Heart rate is under control  Continue metoprolol  EKG sinus rhythm    Urinary retention  Assessment & Plan  Straight cath       VTE prophylaxis: lovenox

## 2021-01-11 DIAGNOSIS — Z23 NEED FOR VACCINATION: ICD-10-CM

## 2022-02-10 NOTE — TELEPHONE ENCOUNTER
Was the patient seen in the last year in this department? Yes     Does patient have an active prescription for medications requested? No     Received Request Via: Patient     Pt's daughter is asking to have a refill as pt was just seen at Urgent care. Call also transferred to scheduling due to Pt still having diarrhea and needs to be seen. Notified Daughter due to severity of diarrhea, pt needs to be seen back at Urgent care if unable to get apt here in office with another provider due to Kerry being out of office. Daughter understanding.      [FreeTextEntry1] : FU cyst [de-identified] : established patient hx parkinsons\par here to fu cyst s/p ILK and doxy and keflex\par better but not gone, squeezed some stuff out this morning\par sore on ankle where hit it, rash in groin using desitin

## 2022-05-23 NOTE — TELEPHONE ENCOUNTER
ESTABLISHED PATIENT PRE-VISIT PLANNING     Note: Patient will not be contacted if there is no indication to call.     1.  Reviewed notes from the last few office visits within the medical group: Yes 10/30/18, 6/6/18     2.  If any orders were placed at last visit or intended to be done for this visit (i.e. 6 mos follow-up), do we have Results/Consult Notes?        •  Labs - Labs ordered, completed on 6/7/18 and results are in chart.   Note: If patient appointment is for lab review and patient did not complete labs, check with provider if OK to reschedule patient until labs completed.       •  Imaging - Imaging was not ordered at last office visit.       •  Referrals - No referrals were ordered at last office visit.    3. Is this appointment scheduled as a Hospital Follow-Up? No    4.  Immunizations were updated in [employer] using [employer]?: Yes       •  [employer] Recommendations: HEPATITIS B, TDAP and ZOSTAVAX (Shingles)    5.  Patient is due for the following Health Maintenance Topics:   Health Maintenance Due   Topic Date Due   • Annual Wellness Visit  05/27/[birth year]29   • IMM DTaP/Tdap/Td Vaccine (1 - Tdap) 05/27/1948   • IMM ZOSTER VACCINES (1 of 2) 05/27/1979     6.  MDX printed for Provider? NO    7.  Patient was NOT informed to arrive 15 min prior to their scheduled appointment and bring in their medication bottles.  
Statement Selected

## 2022-07-14 NOTE — PROGRESS NOTES
Subjective:     Fabien Linares is a 88 y.o. female who presents with GI bleeding.    HPI:   Seen in f/u after hosp for gi bleeding. Her INR was >10.  She was seen last week with rectal bleeding. Went home and had 2 large bloody stools.  That is when they found her elevated INR.    She is now covered by multiple family members caring for her.   MMSE today is 21/30.  She is still driving.    Her cbc in hosp was never anemic.    Seen with daughter today.  She and multiple family members are caring for pt.  Pt remains alert.  [facility] is seeing pt for INR.  One done today and will be f/u by coumadin clinic.      Patient Active Problem List    Diagnosis Date Noted   • 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) 03/08/2018     Priority: High   • Paroxysmal atrial fibrillation (CMS-HCC) 03/08/2018     Priority: Medium   • HTN (hypertension) 03/08/2018     Priority: Medium   • Memory impairment 03/08/2018     Priority: Medium   • Dyslipidemia 10/20/2017     Priority: Low   • Diarrhea of presumed infectious origin 06/14/2017   • Preventative health care 03/05/2014   • GERD (gastroesophageal reflux disease)    • Bell's palsy    • Osteoporosis    • Colon polyps    • Atrial fibrillation with RVR (CMS-HCC) 01/08/2014       Current medicines (including changes today)  Current Outpatient Prescriptions   Medication Sig Dispense Refill   • warfarin (COUMADIN) 5 MG Tab Take 1 Tab by mouth every day. 15 Tab 3   • metoprolol SR (TOPROL XL) 25 MG TABLET SR 24 HR Take 0.5 Tabs by mouth every bedtime. 45 Tab 3   • atorvastatin (LIPITOR) 40 MG Tab Take 1 Tab by mouth every bedtime. Needs to be seen for further refills. Thank you 90 Tab 3     No current facility-administered medications for this visit.        Allergies   Allergen Reactions   • Actonel [Risedronate Sodium]      Leg pain   • Amoxicillin    • Morphine Sulfate      Patient became A Fib   • Naproxen      unknown rxn; doctor said not to take   •  "Vicodin [Hydrocodone-Acetaminophen]      Unknown rxn; doctor said not to take       ROS  Constitutional: Negative. Negative for fever, chills, weight loss, malaise/fatigue and diaphoresis.   HENT: Negative. Negative for hearing loss, ear pain, nosebleeds, congestion, sore throat, neck pain, tinnitus and ear discharge.   Respiratory: Negative. Negative for cough, hemoptysis, sputum production, shortness of breath, wheezing and stridor.   Cardiovascular: Negative. Negative for chest pain, palpitations, orthopnea, claudication, leg swelling and PND.   Gastrointestinal: Denies nausea, vomiting, diarrhea, constipation, heartburn, melena or hematochezia.  Genitourinary: Denies dysuria, hematuria, urinary incontinence, frequency or urgency.        Objective:     Blood pressure 140/80, pulse 81, temperature 36.9 °C (98.4 °F), height 1.549 m (5' 1\"), weight 60.8 kg (134 lb), SpO2 93 %, not currently breastfeeding. Body mass index is 25.32 kg/m².    Physical Exam:  Vitals reviewed.  Constitutional: Oriented to person, place, and time. appears well-developed and well-nourished. No distress.   Cardiovascular: Normal rate, regular rhythm, normal heart sounds and intact distal pulses. Exam reveals no gallop and no friction rub. No murmur heard. No carotid bruits.   Pulmonary/Chest: Effort normal and breath sounds normal. No stridor. No respiratory distress. no wheezes or rales. exhibits no tenderness.   Musculoskeletal: Normal range of motion. exhibits no edema. bridgette pedal pulses 2+.  Lymphadenopathy: No cervical or supraclavicular adenopathy.   Neurological: Alert and oriented to person, place, and time. exhibits normal muscle tone.  Skin: Skin is warm and dry. No diaphoresis.   Psychiatric: Normal mood and affect. Behavior is normal.      Assessment and Plan:     The following treatment plan was discussed:    1. Gastrointestinal hemorrhage, unspecified gastrointestinal hemorrhage type  REFERRAL TO GASTROENTEROLOGY    CBC WITH " DIFFERENTIAL    do lab in 1 months.  f/u for review.  no further bleeding.     2. Elevated INR (international normalized ratio)  REFERRAL TO GASTROENTEROLOGY    CBC WITH DIFFERENTIAL    followed by coumadin clinic.   3. Memory loss      MMSE 21/30.  no driving.  will do driving test with [employer] in may when licesnse expires,.   family are caregivers now.          Followup: Return in about 4 weeks (around 4/10/2018).   The patient has been re-examined and I agree with the above assessment or I updated with my findings.

## 2022-11-29 NOTE — PROGRESS NOTES
Pt discharge to: formerly [facility] via [facility].  Discharge instructions given to pt. Pt verbalized understanding of instructions.   IV(s) removed.  Pt' s belongings with patient upon leaving unit.  Daughter Christiana notified about discharge.   Tranexamic Acid Counseling:  Patient advised of the small risk of bleeding problems with tranexamic acid. They were also instructed to call if they developed any nausea, vomiting or diarrhea. All of the patient's questions and concerns were addressed.

## 2023-12-31 NOTE — TELEPHONE ENCOUNTER
Letter of competency, or lack thereof, is dictated  
Patient's daughter called for Dr. Perrin and is wondering if he can type up a letter stating she is no longer able to handle her medical and financial affairs.     [phone] - Radha     She also wanted me to relay that she is very grateful for the doctor's honesty during her office visit.   
98.2

## 2024-11-07 NOTE — CARE PLAN
Problem: Pain Management  Goal: Pain level will decrease to patient's comfort goal  Outcome: PROGRESSING AS EXPECTED  Pain controlled with prn medication thus far.      Problem: Infection  Goal: Will remain free from infection  Outcome: PROGRESSING AS EXPECTED   Surgical dressing changed with tegaderm and gauze.    In an effort to ensure that our patients [employer], a Team Member has reviewed your chart and identified an opportunity to provide the best care possible. An attempt was made to discuss or schedule due or overdue Preventive or Chronic Condition care.Care Gaps identified: Wellness Visits.    The Outcome was Contact was not made, left message. We are attempting to schedule a yearly wellness visit. If you have any questions or need help with scheduling, contact your primary care provider..   Type of Appointment needed: Medicare Wellness Visit    L/M asking if pt can do MWV with his upcoming FUV of 11/12/24

## (undated) DEVICE — GLOVE BIOGEL PI ORTHO SZ 8 PF LF (40PR/BX)

## (undated) DEVICE — MASK AIRWAY SIZE 3 UNIQUE SILICON (10/BX)

## (undated) DEVICE — DRAPE STRLE REG TOWEL 18X24 - (10/BX 4BX/CA)"

## (undated) DEVICE — KIT ANESTHESIA W/CIRCUIT & 3/LT BAG W/FILTER (20EA/CA)

## (undated) DEVICE — DRAPE LARGE 3 QUARTER - (20/CA)

## (undated) DEVICE — SODIUM CHL IRRIGATION 0.9% 1000ML (12EA/CA)

## (undated) DEVICE — PENCIL ELECTSURG 10FT BTN SWH - (50/CA)

## (undated) DEVICE — HEAD HOLDER JUNIOR/ADULT

## (undated) DEVICE — BIT DRILL INTERTAN 4.0 SHORT

## (undated) DEVICE — SET EXTENSION WITH 2 PORTS (48EA/CA) ***PART #2C8610 IS A SUBSTITUTE*****

## (undated) DEVICE — GLOVE BIOGEL PI INDICATOR SZ 8.0 SURGICAL PF LF -(50/BX 4BX/CA)

## (undated) DEVICE — ELECTRODE 850 FOAM ADHESIVE - HYDROGEL RADIOTRNSPRNT (50/PK)

## (undated) DEVICE — ROD GUIDE R-T TRIGEN 3 X 1000 (1EA)

## (undated) DEVICE — ELECTRODE DUAL RETURN W/ CORD - (50/PK)

## (undated) DEVICE — SUTURE 0 VICRYL PLUS CT-1 - 8 X 18 INCH (12/BX)

## (undated) DEVICE — SUTURE 2-0 VICRYL PLUS CT-1 - 8 X 18 INCH(12/BX)

## (undated) DEVICE — DRESSING TRANSPARENT FILM TEGADERM 4 X 4.75" (50EA/BX)"

## (undated) DEVICE — TUBING CLEARLINK DUO-VENT - C-FLO (48EA/CA)

## (undated) DEVICE — TUBE CONNECT SUCTION CLEAR 120 X 1/4" (50EA/CA)"

## (undated) DEVICE — NEPTUNE 4 PORT MANIFOLD - (20/PK)

## (undated) DEVICE — PROTECTOR ULNA NERVE - (36PR/CA)

## (undated) DEVICE — PACK MAJOR ORTHO - (2EA/CA)

## (undated) DEVICE — GLOVE SZ 8 BIOGEL PI MICRO - PF LF (50PR/BX)

## (undated) DEVICE — SET LEADWIRE 5 LEAD BEDSIDE DISPOSABLE ECG (1SET OF 5/EA)

## (undated) DEVICE — CANISTER SUCTION 3000ML MECHANICAL FILTER AUTO SHUTOFF MEDI-VAC NONSTERILE LF DISP  (40EA/CA)

## (undated) DEVICE — SUCTION INSTRUMENT YANKAUER OPEN TIP W/O VENT (50EA/CA)

## (undated) DEVICE — SLEEVE, VASO, THIGH, MED

## (undated) DEVICE — GLOVE BIOGEL INDICATOR SZ 8 SURGICAL PF LTX - (50/BX 4BX/CA)

## (undated) DEVICE — LACTATED RINGERS INJ 1000 ML - (14EA/CA 60CA/PF)

## (undated) DEVICE — SUCTION INSTRUMENT YANKAUER BULBOUS TIP W/O VENT (50EA/CA)

## (undated) DEVICE — DRAPE C ARMOR (12EA/CA)

## (undated) DEVICE — SENSOR SPO2 NEO LNCS ADHESIVE (20/BX) SEE USER NOTES

## (undated) DEVICE — GLOVE COTTON STERILE (50/CA)

## (undated) DEVICE — MASK ANESTHESIA ADULT  - (100/CA)

## (undated) DEVICE — STAPLER SKIN DISP - (6/BX 10BX/CA) VISISTAT

## (undated) DEVICE — SUTURE GENERAL

## (undated) DEVICE — GLOVE BIOGEL SZ 6.5 SURGICAL PF LTX (50PR/BX 4BX/CA)

## (undated) DEVICE — DRAPE 36X28IN RAD CARM BND BG - (25/CA) O

## (undated) DEVICE — SLEEVE STERILE  A599T - 30/BX 2BX/CS